# Patient Record
Sex: MALE | Race: BLACK OR AFRICAN AMERICAN | Employment: OTHER | URBAN - METROPOLITAN AREA
[De-identification: names, ages, dates, MRNs, and addresses within clinical notes are randomized per-mention and may not be internally consistent; named-entity substitution may affect disease eponyms.]

---

## 2017-04-03 ENCOUNTER — ANESTHESIA (OUTPATIENT)
Dept: ENDOSCOPY | Age: 72
End: 2017-04-03
Payer: MEDICARE

## 2017-04-03 ENCOUNTER — ANESTHESIA EVENT (OUTPATIENT)
Dept: ENDOSCOPY | Age: 72
End: 2017-04-03
Payer: MEDICARE

## 2017-04-03 ENCOUNTER — HOSPITAL ENCOUNTER (OUTPATIENT)
Age: 72
Setting detail: OUTPATIENT SURGERY
Discharge: HOME | End: 2017-04-03
Attending: INTERNAL MEDICINE | Admitting: INTERNAL MEDICINE
Payer: MEDICARE

## 2017-04-03 VITALS
HEIGHT: 68 IN | DIASTOLIC BLOOD PRESSURE: 78 MMHG | HEART RATE: 97 BPM | TEMPERATURE: 98.5 F | SYSTOLIC BLOOD PRESSURE: 120 MMHG | OXYGEN SATURATION: 100 % | RESPIRATION RATE: 16 BRPM

## 2017-04-03 VITALS
WEIGHT: 228 LBS | DIASTOLIC BLOOD PRESSURE: 73 MMHG | BODY MASS INDEX: 33.77 KG/M2 | RESPIRATION RATE: 18 BRPM | OXYGEN SATURATION: 100 % | HEART RATE: 72 BPM | HEIGHT: 69 IN | TEMPERATURE: 97.5 F | SYSTOLIC BLOOD PRESSURE: 123 MMHG

## 2017-04-03 LAB
ATRIAL RATE: 72 BPM
CALCULATED R AXIS, ECG10: -94 DEGREES
CALCULATED T AXIS, ECG11: 76 DEGREES
DIAGNOSIS, 93000: NORMAL
GLUCOSE BLD STRIP.AUTO-MCNC: 89 MG/DL (ref 70–110)
GLUCOSE BLD STRIP.AUTO-MCNC: 96 MG/DL (ref 70–110)
Q-T INTERVAL, ECG07: 412 MS
QRS DURATION, ECG06: 162 MS
QTC CALCULATION (BEZET), ECG08: 453 MS
VENTRICULAR RATE, ECG03: 73 BPM

## 2017-04-03 PROCEDURE — 74011250636 HC RX REV CODE- 250/636: Performed by: ANESTHESIOLOGY

## 2017-04-03 PROCEDURE — 74011250636 HC RX REV CODE- 250/636

## 2017-04-03 PROCEDURE — 76060000031 HC ANESTHESIA FIRST 0.5 HR: Performed by: INTERNAL MEDICINE

## 2017-04-03 PROCEDURE — 76040000019: Performed by: INTERNAL MEDICINE

## 2017-04-03 PROCEDURE — 77030008565 HC TBNG SUC IRR ERBE -B: Performed by: INTERNAL MEDICINE

## 2017-04-03 PROCEDURE — 77030018846 HC SOL IRR STRL H20 ICUM -A: Performed by: INTERNAL MEDICINE

## 2017-04-03 PROCEDURE — 93005 ELECTROCARDIOGRAM TRACING: CPT

## 2017-04-03 RX ORDER — ONDANSETRON 2 MG/ML
4 INJECTION INTRAMUSCULAR; INTRAVENOUS ONCE
Status: DISCONTINUED | OUTPATIENT
Start: 2017-04-03 | End: 2017-04-03 | Stop reason: HOSPADM

## 2017-04-03 RX ORDER — INSULIN LISPRO 100 [IU]/ML
INJECTION, SOLUTION INTRAVENOUS; SUBCUTANEOUS ONCE
Status: DISCONTINUED | OUTPATIENT
Start: 2017-04-03 | End: 2017-04-03 | Stop reason: HOSPADM

## 2017-04-03 RX ORDER — FAMOTIDINE 10 MG/ML
20 INJECTION INTRAVENOUS ONCE
Status: DISCONTINUED | OUTPATIENT
Start: 2017-04-03 | End: 2017-04-03 | Stop reason: HOSPADM

## 2017-04-03 RX ORDER — SODIUM CHLORIDE, SODIUM LACTATE, POTASSIUM CHLORIDE, CALCIUM CHLORIDE 600; 310; 30; 20 MG/100ML; MG/100ML; MG/100ML; MG/100ML
75 INJECTION, SOLUTION INTRAVENOUS CONTINUOUS
Status: DISCONTINUED | OUTPATIENT
Start: 2017-04-03 | End: 2017-04-03 | Stop reason: HOSPADM

## 2017-04-03 RX ORDER — SODIUM CHLORIDE 0.9 % (FLUSH) 0.9 %
5-10 SYRINGE (ML) INJECTION AS NEEDED
Status: DISCONTINUED | OUTPATIENT
Start: 2017-04-03 | End: 2017-04-03 | Stop reason: HOSPADM

## 2017-04-03 RX ORDER — MAGNESIUM SULFATE 100 %
4 CRYSTALS MISCELLANEOUS AS NEEDED
Status: DISCONTINUED | OUTPATIENT
Start: 2017-04-03 | End: 2017-04-03 | Stop reason: HOSPADM

## 2017-04-03 RX ORDER — PROPOFOL 10 MG/ML
INJECTION, EMULSION INTRAVENOUS AS NEEDED
Status: DISCONTINUED | OUTPATIENT
Start: 2017-04-03 | End: 2017-04-03 | Stop reason: HOSPADM

## 2017-04-03 RX ORDER — VITAMIN E 1000 UNIT
1000 CAPSULE ORAL
COMMUNITY

## 2017-04-03 RX ORDER — SODIUM CHLORIDE, SODIUM LACTATE, POTASSIUM CHLORIDE, CALCIUM CHLORIDE 600; 310; 30; 20 MG/100ML; MG/100ML; MG/100ML; MG/100ML
25 INJECTION, SOLUTION INTRAVENOUS CONTINUOUS
Status: DISCONTINUED | OUTPATIENT
Start: 2017-04-03 | End: 2017-04-03 | Stop reason: HOSPADM

## 2017-04-03 RX ORDER — MELATONIN
1000 DAILY
COMMUNITY

## 2017-04-03 RX ORDER — DEXTROSE 50 % IN WATER (D50W) INTRAVENOUS SYRINGE
25-50 AS NEEDED
Status: DISCONTINUED | OUTPATIENT
Start: 2017-04-03 | End: 2017-04-03 | Stop reason: HOSPADM

## 2017-04-03 RX ORDER — SODIUM CHLORIDE 0.9 % (FLUSH) 0.9 %
5-10 SYRINGE (ML) INJECTION EVERY 8 HOURS
Status: DISCONTINUED | OUTPATIENT
Start: 2017-04-03 | End: 2017-04-03 | Stop reason: HOSPADM

## 2017-04-03 RX ADMIN — PROPOFOL 50 MG: 10 INJECTION, EMULSION INTRAVENOUS at 10:24

## 2017-04-03 RX ADMIN — SODIUM CHLORIDE, SODIUM LACTATE, POTASSIUM CHLORIDE, AND CALCIUM CHLORIDE 25 ML/HR: 600; 310; 30; 20 INJECTION, SOLUTION INTRAVENOUS at 08:57

## 2017-04-03 RX ADMIN — PROPOFOL 50 MG: 10 INJECTION, EMULSION INTRAVENOUS at 10:29

## 2017-04-03 RX ADMIN — PROPOFOL 30 MG: 10 INJECTION, EMULSION INTRAVENOUS at 10:27

## 2017-04-03 RX ADMIN — PROPOFOL 20 MG: 10 INJECTION, EMULSION INTRAVENOUS at 10:32

## 2017-04-03 NOTE — IP AVS SNAPSHOT
Jonas Spannyanet 
 
 
 920 [facility] 61 [facility] Patient: Del Sibley MRN: [id number] BVE:[birth year] You are allergic to the following Allergen Reactions Tradjenta (Linagliptin) Anaphylaxis Ace Inhibitors Angioedema Recent Documentation Height Weight BMI Smoking Status 1.74 m 103.4 kg 34.16 kg/m2 Former Smoker Emergency Contacts Name Discharge Info Relation Home Work Mobile Shantal Rios NO [2] Other Relative [6] [phone] About your hospitalization You were admitted on:  April 3, 2017 You last received care in the:  [facility] PHASE 2 RECOVERY You were discharged on:  April 3, 2017 Unit phone number:  [phone] Why you were hospitalized Your primary diagnosis was:  Not on File Providers Seen During Your Hospitalizations Provider Role Specialty Primary office phone Jony Odonnell MD Attending Provider Gastroenterology [phone] Your Primary Care Physician (PCP) Primary Care Physician Office Phone Office Fax [facility] [phone] Follow-up Information Follow up With Details Comments Contact Info Zaid Rebollar MD   [address]  [address] [facility] [address] 
[phone] Jony Odonnell MD  Colonoscopy in 5 years. [address] [facility] [address] [facility] [address] 
[phone] Your Appointments Monday April 03, 2017 COLONOSCOPY with Jony Odonnell MD  
[facility] ENDOSCOPY [address]) [address] [facility] [address] Tuesday April 11, 2017 10:15 AM EDT Nurse Visit with [email or web address]_UVA [facility] [address] ([facility]) 127 [facility] 200 [facility] [address]  
[phone]  Tuesday April 25, 2017  9:45 AM EDT  
ESTABLISHED PATIENT with Jordy Gibson MD  
 [facility] [address] ([address]) [address]  
[phone] Current Discharge Medication List  
  
CONTINUE these medications which have NOT CHANGED Dose & Instructions Dispensing Information Comments Morning Noon Evening Bedtime  
 allopurinol 300 mg tablet Commonly known as:  Ritta Popper Your last dose was: Your next dose is: Take  by mouth daily. Refills:  0 AMARYL 4 mg tablet Generic drug:  glimepiride Your last dose was: Your next dose is: Take  by mouth every morning. Refills:  0  
     
   
   
   
  
 aspirin 81 mg chewable tablet Your last dose was: Your next dose is:    
   
   
 Dose:  81 mg Take 81 mg by mouth daily. Refills:  0  
     
   
   
   
  
 carvedilol 25 mg tablet Commonly known as:  Maria M Gay Your last dose was: Your next dose is:    
   
   
 Dose:  25 mg Take 25 mg by mouth two (2) times daily (with meals). Refills:  0  
     
   
   
   
  
 COUMADIN 2.5 mg tablet Generic drug:  warfarin Your last dose was: Your next dose is:    
   
   
 Dose:  2.5 mg Take 2.5 mg by mouth daily. Refills:  0  
     
   
   
   
  
 finasteride 5 mg tablet Commonly known as:  PROSCAR Your last dose was: Your next dose is:    
   
   
 Dose:  5 mg Take 5 mg by mouth daily. Refills:  0  
     
   
   
   
  
 furosemide 40 mg tablet Commonly known as:  LASIX Your last dose was: Your next dose is: Take  by mouth daily. Refills:  0  
     
   
   
   
  
 LANOXIN 0.25 mg tablet Generic drug:  digoxin Your last dose was: Your next dose is: Take  by mouth daily. Refills:  0  
     
   
   
   
  
 losartan 100 mg tablet Commonly known as:  COZAAR Your last dose was: Your next dose is: Dose:  100 mg Take 100 mg by mouth daily. Refills:  0  
     
   
   
   
  
 metFORMIN 1,000 mg tablet Commonly known as:  GLUCOPHAGE Your last dose was: Your next dose is:    
   
   
 Dose:  1000 mg Take 1,000 mg by mouth two (2) times daily (with meals). Refills:  0  
     
   
   
   
  
 metoprolol tartrate 100 mg IR tablet Commonly known as:  LOPRESSOR Your last dose was: Your next dose is: Take  by mouth two (2) times a day. Refills:  0  
     
   
   
   
  
 multivitamin tablet Commonly known as:  ONE A DAY Your last dose was: Your next dose is:    
   
   
 Dose:  1 Tab Take 1 tablet by mouth daily. Refills:  0  
     
   
   
   
  
 spironolactone 25 mg tablet Commonly known as:  ALDACTONE Your last dose was: Your next dose is: Take  by mouth daily. Refills:  0  
     
   
   
   
  
 VITAMIN B-12 1,000 mcg tablet Generic drug:  cyanocobalamin Your last dose was: Your next dose is:    
   
   
 Dose:  1000 mcg Take 1,000 mcg by mouth daily. Refills:  0  
     
   
   
   
  
 VITAMIN C 1,000 mg tablet Generic drug:  ascorbic acid (vitamin C) Your last dose was: Your next dose is:    
   
   
 Dose:  1000 mg Take 1,000 mg by mouth. Refills:  0  
     
   
   
   
  
 VITAMIN D3 1,000 unit tablet Generic drug:  cholecalciferol Your last dose was: Your next dose is:    
   
   
 Dose:  1000 Units Take 1,000 Units by mouth daily. Refills:  0 ZETIA 10 mg tablet Generic drug:  ezetimibe Your last dose was: Your next dose is: Take  by mouth. Refills:  0 ZOCOR 40 mg tablet Generic drug:  simvastatin Your last dose was: Your next dose is: Take  by mouth nightly. Refills:  0 Discharge Instructions Colonoscopy: What to Expect at [facility] Your Recovery After you have a colonoscopy, you will stay at the clinic for 1 to 2 hours until the medicines wear off. Then you can go home. But you will need to arrange for a ride. Your doctor will tell you when you can eat and do your other usual activities. Your doctor will talk to you about when you will need your next colonoscopy. Your doctor can help you decide how often you need to be checked. This will depend on the results of your test and your risk for colorectal cancer. After the test, you may be bloated or have gas pains. You may need to pass gas. If a biopsy was done or a polyp was removed, you may have streaks of blood in your stool (feces) for a few days. This care sheet gives you a general idea about how long it will take for you to recover. But each person recovers at a different pace. Follow the steps below to get better as quickly as possible. How can you care for yourself at home? Activity · Rest when you feel tired. · You can do your normal activities when it feels okay to do so. Diet · Follow your doctor's directions for eating. · Unless your doctor has told you not to, drink plenty of fluids. This helps to replace the fluids that were lost during the colon prep. · Do not drink alcohol. Medicines · Your doctor will tell you if and when you can restart your medicines. He or she will also give you instructions about taking any new medicines. · If you take blood thinners, such as warfarin (Coumadin), clopidogrel (Plavix), or aspirin, be sure to talk to your doctor. He or she will tell you if and when to start taking those medicines again. Make sure that you understand exactly what your doctor wants you to do.  
· If polyps were removed or a biopsy was done during the test, your doctor may tell you not to take aspirin or other anti-inflammatory medicines for a few days. These include ibuprofen (Advil, Motrin) and naproxen (Aleve). Other instructions · For your safety, do not drive or operate machinery until the medicine wears off and you can think clearly. Your doctor may tell you not to drive or operate machinery until the day after your test. 
· Do not sign legal documents or make major decisions until the medicine wears off and you can think clearly. The anesthesia can make it hard for you to fully understand what you are agreeing to. Follow-up care is a key part of your treatment and safety. Be sure to make and go to all appointments, and call your doctor if you are having problems. It's also a good idea to know your test results and keep a list of the medicines you take. When should you call for help? Call 911 anytime you think you may need emergency care. For example, call if: 
· You passed out (lost consciousness). · You pass maroon or bloody stools. · You have severe belly pain. Call your doctor now or seek immediate medical care if: 
· Your stools are black and tarlike. · Your stools have streaks of blood, but you did not have a biopsy or any polyps removed. · You have belly pain, or your belly is swollen and firm. · You vomit. · You have a fever. · You are very dizzy. Watch closely for changes in your health, and be sure to contact your doctor if you have any problems. Where can you learn more? Go to http://yaw-junie.info/. Enter E264 in the search box to learn more about \"Colonoscopy: What to Expect at Home. \" Current as of: August 9, 2016 Content Version: 11.2 © 2458-7113 [employer]. Care instructions adapted under license by [employer] (which disclaims liability or warranty for this information).  If you have questions about a medical condition or this instruction, always ask your healthcare professional. [address] any warranty or liability for your use of this information. Diverticulosis: Care Instructions Your Care Instructions In diverticulosis, pouches called diverticula form in the wall of the large intestine (colon). The pouches do not cause any pain or other symptoms. Most people who have diverticulosis do not know they have it. But the pouches sometimes bleed, and if they become infected, they can cause pain and other symptoms. When this happens, it is called diverticulitis. Diverticula form when pressure pushes the wall of the colon outward at certain weak points. A diet that is too low in fiber can cause diverticula. Follow-up care is a key part of your treatment and safety. Be sure to make and go to all appointments, and call your doctor if you are having problems. It's also a good idea to know your test results and keep a list of the medicines you take. How can you care for yourself at home? · Include fruits, leafy green vegetables, beans, and whole grains in your diet each day. These foods are high in fiber. · Take a fiber supplement, such as Citrucel or Metamucil, every day if needed. Read and follow all instructions on the label. · Drink plenty of fluids, enough so that your urine is light yellow or clear like water. If you have kidney, heart, or liver disease and have to limit fluids, talk with your doctor before you increase the amount of fluids you drink. · Get at least 30 minutes of exercise on most days of the week. Walking is a good choice. You also may want to do other activities, such as running, swimming, cycling, or playing tennis or team sports. · Cut out foods that cause gas, pain, or other symptoms. When should you call for help? Call your doctor now or seek immediate medical care if: 
· You have belly pain. · You pass maroon or very bloody stools. · You have a fever. · You have nausea and vomiting. · You have unusual changes in your bowel movements or abdominal swelling. · You have burning pain when you urinate. · You have abnormal vaginal discharge. · You have shoulder pain. · You have cramping pain that does not get better when you have a bowel movement or pass gas. · You pass gas or stool from your urethra while urinating. Watch closely for changes in your health, and be sure to contact your doctor if you have any problems. Where can you learn more? Go to http://yaw-junie.info/. Enter G384 in the search box to learn more about \"Diverticulosis: Care Instructions. \" Current as of: August 9, 2016 Content Version: 11.2 © 4005-6101 [employer]. Care instructions adapted under license by [employer] (which disclaims liability or warranty for this information). If you have questions about a medical condition or this instruction, always ask your healthcare professional. [place] [address] any warranty or liability for your use of this information. DISCHARGE SUMMARY from Nurse The following personal items are in your possession at time of discharge: 
 
Dental Appliances: None Visual Aid: Glasses PATIENT INSTRUCTIONS: 
 
 
F-face looks uneven A-arms unable to move or move unevenly S-speech slurred or non-existent T-time-call 911 as soon as signs and symptoms begin-DO NOT go Back to bed or wait to see if you get better-TIME IS BRAIN. Warning Signs of HEART ATTACK Call 911 if you have these symptoms: 
? Chest discomfort. Most heart attacks involve discomfort in the center of the chest that lasts more than a few minutes, or that goes away and comes back. It can feel like uncomfortable pressure, squeezing, fullness, or pain. ? Discomfort in other areas of the upper body. Symptoms can include pain or discomfort in one or both arms, the back, neck, jaw, or stomach. ? Shortness of breath with or without chest discomfort. ? Other signs may include breaking out in a cold sweat, nausea, or lightheadedness. Don't wait more than five minutes to call [address]! Fast action can save your life. Calling 911 is almost always the fastest way to get lifesaving treatment. Emergency Medical Services staff can begin treatment when they arrive  up to an hour sooner than if someone gets to the hospital by car. The discharge information has been reviewed with the patient. The patient verbalized understanding. Discharge medications reviewed with the patient and appropriate educational materials and side effects teaching were provided. Discharge Orders None Introducing \[facility]\"" & HEALTH SERVICES! [facility] introduces e|tab patient portal. Now you can access parts of your medical record, email your doctor's office, and request medication refills online. 1. In your internet browser, go to [email or web address] 2. Click on the First Time User? Click Here link in the Sign In box. You will see the New Member Sign Up page. 3. Enter your e|tab Access Code exactly as it appears below. You will not need to use this code after youve completed the sign-up process. If you do not sign up before the expiration date, you must request a new code. · e|tab Access Code: [email or web address] Expires: 6/27/2017  4:56 PM 
 
4. Enter the last four digits of your Social Security Number (xxxx) and Date of Birth (mm/dd/yyyy) as indicated and click Submit. You will be taken to the next sign-up page. 5. Create a e|tab ID. This will be your e|tab login ID and cannot be changed, so think of one that is secure and easy to remember. 6. Create a e|tab password. You can change your password at any time. 7. Enter your Password Reset Question and Answer. This can be used at a later time if you forget your password. 8. Enter your e-mail address. You will receive e-mail notification when new information is available in [address] 9. Click Sign Up. You can now view and download portions of your medical record. 10. Click the Download Summary menu link to download a portable copy of your medical information. If you have questions, please visit the Frequently Asked Questions section of the [employer] website. Remember, [employer] is NOT to be used for urgent needs. For medical emergencies, dial 911. Now available from your iPhone and Android! General Information Please provide this summary of care documentation to your next provider. Patient Signature:  ____________________________________________________________ Date:  ____________________________________________________________  
  
Jamison Ana Provider Signature:  ____________________________________________________________ Date:  ____________________________________________________________

## 2017-04-03 NOTE — DISCHARGE INSTRUCTIONS
Colonoscopy: What to Expect at [address] [place] [address]  After you have a colonoscopy, you will stay at the clinic for 1 to 2 hours until the medicines wear off. Then you can go home. But you will need to arrange for a ride. Your doctor will tell you when you can eat and do your other usual activities. Your doctor will talk to you about when you will need your next colonoscopy. Your doctor can help you decide how often you need to be checked. This will depend on the results of your test and your risk for colorectal cancer. After the test, you may be bloated or have gas pains. You may need to pass gas. If a biopsy was done or a polyp was removed, you may have streaks of blood in your stool (feces) for a few days. This care sheet gives you a general idea about how long it will take for you to recover. But each person recovers at a different pace. Follow the steps below to get better as quickly as possible. How can you care for yourself at home? Activity  · Rest when you feel tired. · You can do your normal activities when it feels okay to do so. Diet  · Follow your doctor's directions for eating. · Unless your doctor has told you not to, drink plenty of fluids. This helps to replace the fluids that were lost during the colon prep. · Do not drink alcohol. Medicines  · Your doctor will tell you if and when you can restart your medicines. He or she will also give you instructions about taking any new medicines. · If you take blood thinners, such as warfarin (Coumadin), clopidogrel (Plavix), or aspirin, be sure to talk to your doctor. He or she will tell you if and when to start taking those medicines again. Make sure that you understand exactly what your doctor wants you to do. · If polyps were removed or a biopsy was done during the test, your doctor may tell you not to take aspirin or other anti-inflammatory medicines for a few days. These include ibuprofen (Advil, Motrin) and naproxen (Aleve).   Other instructions  · For your safety, do not drive or operate machinery until the medicine wears off and you can think clearly. Your doctor may tell you not to drive or operate machinery until the day after your test.  · Do not sign legal documents or make major decisions until the medicine wears off and you can think clearly. The anesthesia can make it hard for you to fully understand what you are agreeing to. Follow-up care is a key part of your treatment and safety. Be sure to make and go to all appointments, and call your doctor if you are having problems. It's also a good idea to know your test results and keep a list of the medicines you take. When should you call for help? Call 911 anytime you think you may need emergency care. For example, call if:  · You passed out (lost consciousness). · You pass maroon or bloody stools. · You have severe belly pain. Call your doctor now or seek immediate medical care if:  · Your stools are black and tarlike. · Your stools have streaks of blood, but you did not have a biopsy or any polyps removed. · You have belly pain, or your belly is swollen and firm. · You vomit. · You have a fever. · You are very dizzy. Watch closely for changes in your health, and be sure to contact your doctor if you have any problems. Where can you learn more? Go to http://yaw-junie.info/. Enter E264 in the search box to learn more about \"Colonoscopy: What to Expect at Home. \"  Current as of: August 9, 2016  Content Version: 11.2  © 2229-7347 [employer]. Care instructions adapted under license by [employer] (which disclaims liability or warranty for this information). If you have questions about a medical condition or this instruction, always ask your healthcare professional. [address] any warranty or liability for your use of this information.      Diverticulosis: Care Instructions  Your Care Instructions  In diverticulosis, pouches called diverticula form in the wall of the large intestine (colon). The pouches do not cause any pain or other symptoms. Most people who have diverticulosis do not know they have it. But the pouches sometimes bleed, and if they become infected, they can cause pain and other symptoms. When this happens, it is called diverticulitis. Diverticula form when pressure pushes the wall of the colon outward at certain weak points. A diet that is too low in fiber can cause diverticula. Follow-up care is a key part of your treatment and safety. Be sure to make and go to all appointments, and call your doctor if you are having problems. It's also a good idea to know your test results and keep a list of the medicines you take. How can you care for yourself at home? · Include fruits, leafy green vegetables, beans, and whole grains in your diet each day. These foods are high in fiber. · Take a fiber supplement, such as Citrucel or Metamucil, every day if needed. Read and follow all instructions on the label. · Drink plenty of fluids, enough so that your urine is light yellow or clear like water. If you have kidney, heart, or liver disease and have to limit fluids, talk with your doctor before you increase the amount of fluids you drink. · Get at least 30 minutes of exercise on most days of the week. Walking is a good choice. You also may want to do other activities, such as running, swimming, cycling, or playing tennis or team sports. · Cut out foods that cause gas, pain, or other symptoms. When should you call for help? Call your doctor now or seek immediate medical care if:  · You have belly pain. · You pass maroon or very bloody stools. · You have a fever. · You have nausea and vomiting. · You have unusual changes in your bowel movements or abdominal swelling. · You have burning pain when you urinate. · You have abnormal vaginal discharge. · You have shoulder pain.   · You have cramping pain that does not get better when you have a bowel movement or pass gas. · You pass gas or stool from your urethra while urinating. Watch closely for changes in your health, and be sure to contact your doctor if you have any problems. Where can you learn more? Go to http://yaw-junie.info/. Enter C359 in the search box to learn more about \"Diverticulosis: Care Instructions. \"  Current as of: August 9, 2016  Content Version: 11.2  © 7110-8496 [employer]. Care instructions adapted under license by [employer] (which disclaims liability or warranty for this information). If you have questions about a medical condition or this instruction, always ask your healthcare professional. [place] [address] any warranty or liability for your use of this information. DISCHARGE SUMMARY from Nurse    The following personal items are in your possession at time of discharge:    Dental Appliances: None  Visual Aid: Glasses                  PATIENT INSTRUCTIONS:    After general anesthesia or intravenous sedation, for 24 hours or while taking prescription Narcotics:  · Limit your activities  · Do not drive and operate hazardous machinery  · Do not make important personal or business decisions  · Do  not drink alcoholic beverages  · If you have not urinated within 8 hours after discharge, please contact your surgeon on call. Report the following to your surgeon:  · Excessive pain, swelling, redness or odor of or around the surgical area  · Temperature over 100.5  · Nausea and vomiting lasting longer than 4 hours or if unable to take medications  · Any signs of decreased circulation or nerve impairment to extremity: change in color, persistent  numbness, tingling, coldness or increase pain  · Any questions    *  Please give a list of your current medications to your Primary Care Provider.     *  Please update this list whenever your medications are discontinued, doses are      changed, or new medications (including over-the-counter products) are added. *  Please carry medication information at all times in case of emergency situations. These are general instructions for a healthy lifestyle:    No smoking/ No tobacco products/ Avoid exposure to second hand smoke    Surgeon General's Warning:  Quitting smoking now greatly reduces serious risk to your health. Obesity, smoking, and sedentary lifestyle greatly increases your risk for illness    A healthy diet, regular physical exercise & weight monitoring are important for maintaining a healthy lifestyle    You may be retaining fluid if you have a history of heart failure or if you experience any of the following symptoms:  Weight gain of 3 pounds or more overnight or 5 pounds in a week, increased swelling in our hands or feet or shortness of breath while lying flat in bed. Please call your doctor as soon as you notice any of these symptoms; do not wait until your next office visit. Recognize signs and symptoms of STROKE:    F-face looks uneven    A-arms unable to move or move unevenly    S-speech slurred or non-existent    T-time-call 911 as soon as signs and symptoms begin-DO NOT go       Back to bed or wait to see if you get better-TIME IS BRAIN. Warning Signs of HEART ATTACK     Call 911 if you have these symptoms:   Chest discomfort. Most heart attacks involve discomfort in the center of the chest that lasts more than a few minutes, or that goes away and comes back. It can feel like uncomfortable pressure, squeezing, fullness, or pain.  Discomfort in other areas of the upper body. Symptoms can include pain or discomfort in one or both arms, the back, neck, jaw, or stomach.  Shortness of breath with or without chest discomfort.  Other signs may include breaking out in a cold sweat, nausea, or lightheadedness. Don't wait more than five minutes to call 911 - MINUTES MATTER!  Fast action can save your life. Calling 911 is almost always the fastest way to get lifesaving treatment. Emergency Medical Services staff can begin treatment when they arrive -- up to an hour sooner than if someone gets to the hospital by car. The discharge information has been reviewed with the patient. The patient verbalized understanding. Discharge medications reviewed with the patient and appropriate educational materials and side effects teaching were provided.

## 2017-04-03 NOTE — PROCEDURES
[facility]  [address] [place], [address]      Brief Procedure Note    Del Sibley  1945  [id number]    Date of Procedure: 4/3/2017    Preoperative diagnosis: dx Hx of polyps    Postoperative diagnosis:  Diverticulosis    Type of Anesthesia: MAC (monitered anesthesia care)    Description of Findings: same as post op dx    Procedure: Procedure(s):  COLONOSCOPY    :  Dr. Jony Odonnell MD    Assistant(s): [unfilled]    Type of Anesthesia:MAC     EBL:None    Specimens: * No specimens in log *    Findings: See printed and scanned procedure note    Complications: None    Dr. Jony Odonnell MD  4/3/2017  10:37 AM

## 2017-04-03 NOTE — ANESTHESIA PREPROCEDURE EVALUATION
Anesthetic History   No history of anesthetic complications            Review of Systems / Medical History  Patient summary reviewed and pertinent labs reviewed    Pulmonary        Sleep apnea: No treatment  Undiagnosed apnea         Neuro/Psych   Within defined limits           Cardiovascular  Within defined limits  Hypertension        Dysrhythmias   CAD    Exercise tolerance: >4 METS     GI/Hepatic/Renal  Within defined limits              Endo/Other    Diabetes: well controlled, type 2         Other Findings   Comments:   Risk Factors for Postoperative nausea/vomiting:       History of postoperative nausea/vomiting? NO       Female? NO       Motion sickness? NO       Intended opioid administration for postoperative analgesia?   NO           Physical Exam    Airway  Mallampati: II  TM Distance: 4 - 6 cm  Neck ROM: normal range of motion   Mouth opening: Normal     Cardiovascular  Regular rate and rhythm,  S1 and S2 normal,  no murmur, click, rub, or gallop  Rhythm: regular  Rate: normal         Dental    Dentition: Lower dentition intact and Upper dentition intact     Pulmonary  Breath sounds clear to auscultation               Abdominal  GI exam deferred       Other Findings            Anesthetic Plan    ASA: 3  Anesthesia type: MAC          Induction: Intravenous  Anesthetic plan and risks discussed with: Patient

## 2017-04-03 NOTE — ANESTHESIA POSTPROCEDURE EVALUATION
Post-Anesthesia Evaluation and Assessment    Patient: Paul Smith MRN: [id number]  SSN: [id number]    YOB: 1945  Age: 66 y.o. Sex: male       Cardiovascular Function/Vital Signs  Visit Vitals    BP 98/54    Pulse 87    Temp 36.7 °C (98 °F)    Resp 12    Ht 5' 8.5\" (1.74 m)    Wt 103.4 kg (228 lb)    SpO2 97%    BMI 34.16 kg/m2       Patient is status post MAC anesthesia for Procedure(s):  COLONOSCOPY. Nausea/Vomiting: None    Postoperative hydration reviewed and adequate. Pain:  Pain Scale 1: Numeric (0 - 10) (04/03/17 0859)  Pain Intensity 1: 0 (04/03/17 0859)   Managed    Neurological Status: At baseline    Mental Status and Level of Consciousness: Arousable    Pulmonary Status:   O2 Device: Nasal cannula (04/03/17 1037)   Adequate oxygenation and airway patent    Complications related to anesthesia: None    Post-anesthesia assessment completed.  No concerns    Signed By: Aline Méndez CRNA     April 3, 2017

## 2017-04-03 NOTE — H&P
Chief Complaint: History of colon polyps. History of present illness: No complaints. PMH:   Past Medical History:   Diagnosis Date    Anemia     Atrial fibrillation (HCC)     BPH (benign prostatic hypertrophy)     CAD (coronary artery disease)     CHF (congestive heart failure) (HCC)     Diabetes (HCC)     Gout     Hyperlipidemia     Hypertension     Pacemaker     Vitamin D deficiency      Allergies: Allergies   Allergen Reactions    Tradjenta [Linagliptin] Anaphylaxis    Ace Inhibitors Angioedema     Medications:   Current Facility-Administered Medications:     lactated ringers infusion, 25 mL/hr, IntraVENous, CONTINUOUS, Aurea Alicea MD, Last Rate: 25 mL/hr at 04/03/17 0857, 25 mL/hr at 04/03/17 0857    sodium chloride (NS) flush 5-10 mL, 5-10 mL, IntraVENous, Q8H, Aurea Alicea MD    sodium chloride (NS) flush 5-10 mL, 5-10 mL, IntraVENous, PRN, Aurea Alicea MD    famotidine (PF) (PEPCID) injection 20 mg, 20 mg, IntraVENous, ONCE, Aurea Alicea MD, Stopped at 04/03/17 6750    insulin lispro (HUMALOG) injection, , SubCUTAneous, ONCE, Aurea Alicea MD  FH: History reviewed. No pertinent family history.   Social:   Social History     Social History    Marital status:      Spouse name: N/A    Number of children: N/A    Years of education: N/A     Social History Main Topics    Smoking status: Former Smoker     Quit date: 7/29/1981    Smokeless tobacco: Never Used    Alcohol use No    Drug use: No    Sexual activity: Not Asked     Other Topics Concern    None     Social History Narrative     Surgical H:   Past Surgical History:   Procedure Laterality Date    HX OTHER SURGICAL      colonoscopy    HX PACEMAKER      defibrillator    HX PACEMAKER PLACEMENT      HX TONSILLECTOMY         ROS: negative    Physical Exam:   Visit Vitals    /86    Pulse 84    Temp 98 °F (36.7 °C)    Resp 17    Ht 5' 8.5\" (1.74 m)    Wt 103.4 kg (228 lb)    SpO2 96%    BMI 34.16 kg/m2 General appearance: alert, no distress  Eyes: pupils equal and reactive, extraocular eye movements intact  Nodes: No gross adenopathy in neck. Skin: no spider angiomata, jaundice, palmar erythema   Respiratory: clear to auscultation bilaterally  Cardiovascular: regular heart rate, no murmurs, no JVD, normal rate and regular rhythm  Abdomen: soft, non-tender, liver not enlarged, spleen not palpable, no obvious ascites  Extremities: no muscle wasting, no gross arthritic changes  Neurologic: alert and oriented, cranial nerves grossly intact, no asterixis    Labs:   Recent Results (from the past 24 hour(s))   GLUCOSE, POC    Collection Time: 04/03/17  7:16 AM   Result Value Ref Range    Glucose (POC) 96 70 - 110 mg/dL   EKG, 12 LEAD, INITIAL    Collection Time: 04/03/17  9:07 AM   Result Value Ref Range    Ventricular Rate 73 BPM    Atrial Rate 72 BPM    QRS Duration 162 ms    Q-T Interval 412 ms    QTC Calculation (Bezet) 453 ms    Calculated R Axis -94 degrees    Calculated T Axis 76 degrees    Diagnosis       Electronic ventricular pacemaker  No previous ECGs available           Imp/ Plan: Will proceed with colonoscopy as planned. Risk benefits alternative including but not limited to infection, bleeding, perforation of viscous, allergic reaction and resultant morbidity and mortality was discussed. Chance of missing a significant lesion due to various reasons were discussed.       Shelly Aldana MD  [facility], [address]

## 2017-10-04 ENCOUNTER — HOSPITAL ENCOUNTER (INPATIENT)
Age: 72
LOS: 6 days | Discharge: HOME | DRG: 377 | End: 2017-10-10
Attending: EMERGENCY MEDICINE | Admitting: HOSPITALIST
Payer: MEDICARE

## 2017-10-04 DIAGNOSIS — D64.9 SYMPTOMATIC ANEMIA: ICD-10-CM

## 2017-10-04 DIAGNOSIS — K92.2 GASTROINTESTINAL HEMORRHAGE, UNSPECIFIED GASTROINTESTINAL HEMORRHAGE TYPE: Primary | ICD-10-CM

## 2017-10-04 PROBLEM — K62.5 RECTAL BLEED: Status: ACTIVE | Noted: 2017-10-04

## 2017-10-04 LAB
ALBUMIN SERPL-MCNC: 2.7 G/DL (ref 3.4–5)
ALBUMIN/GLOB SERPL: 1 {RATIO} (ref 0.8–1.7)
ALP SERPL-CCNC: 40 U/L (ref 45–117)
ALT SERPL-CCNC: 18 U/L (ref 16–61)
ANION GAP SERPL CALC-SCNC: 8 MMOL/L (ref 3–18)
AST SERPL-CCNC: 13 U/L (ref 15–37)
BASOPHILS # BLD: 0 K/UL (ref 0–0.1)
BASOPHILS NFR BLD: 0 % (ref 0–2)
BILIRUB SERPL-MCNC: 0.6 MG/DL (ref 0.2–1)
BUN SERPL-MCNC: 86 MG/DL (ref 7–18)
BUN/CREAT SERPL: 55 (ref 12–20)
CALCIUM SERPL-MCNC: 8.5 MG/DL (ref 8.5–10.1)
CHLORIDE SERPL-SCNC: 110 MMOL/L (ref 100–108)
CO2 SERPL-SCNC: 24 MMOL/L (ref 21–32)
CREAT SERPL-MCNC: 1.56 MG/DL (ref 0.6–1.3)
DIFFERENTIAL METHOD BLD: ABNORMAL
EOSINOPHIL # BLD: 0 K/UL (ref 0–0.4)
EOSINOPHIL NFR BLD: 0 % (ref 0–5)
ERYTHROCYTE [DISTWIDTH] IN BLOOD BY AUTOMATED COUNT: 16.3 % (ref 11.6–14.5)
EST. AVERAGE GLUCOSE BLD GHB EST-MCNC: ABNORMAL MG/DL
GLOBULIN SER CALC-MCNC: 2.8 G/DL (ref 2–4)
GLUCOSE BLD STRIP.AUTO-MCNC: 203 MG/DL (ref 70–110)
GLUCOSE SERPL-MCNC: 212 MG/DL (ref 74–99)
HBA1C MFR BLD: <3.5 % (ref 4.2–5.6)
HCT VFR BLD AUTO: 16.8 % (ref 36–48)
HGB BLD-MCNC: 5.7 G/DL (ref 13–16)
INR PPP: 1.5 (ref 0.8–1.2)
LYMPHOCYTES # BLD: 0.9 K/UL (ref 0.9–3.6)
LYMPHOCYTES NFR BLD: 10 % (ref 21–52)
MCH RBC QN AUTO: 25.3 PG (ref 24–34)
MCHC RBC AUTO-ENTMCNC: 33.9 G/DL (ref 31–37)
MCV RBC AUTO: 74.7 FL (ref 74–97)
MONOCYTES # BLD: 0.3 K/UL (ref 0.05–1.2)
MONOCYTES NFR BLD: 3 % (ref 3–10)
NEUTS SEG # BLD: 7.3 K/UL (ref 1.8–8)
NEUTS SEG NFR BLD: 87 % (ref 40–73)
PLATELET # BLD AUTO: 151 K/UL (ref 135–420)
PMV BLD AUTO: 11.2 FL (ref 9.2–11.8)
POTASSIUM SERPL-SCNC: 4.5 MMOL/L (ref 3.5–5.5)
PROT SERPL-MCNC: 5.5 G/DL (ref 6.4–8.2)
PROTHROMBIN TIME: 17.8 SEC (ref 11.5–15.2)
RBC # BLD AUTO: 2.25 M/UL (ref 4.7–5.5)
SODIUM SERPL-SCNC: 142 MMOL/L (ref 136–145)
WBC # BLD AUTO: 8.5 K/UL (ref 4.6–13.2)

## 2017-10-04 PROCEDURE — 65270000029 HC RM PRIVATE

## 2017-10-04 PROCEDURE — 74011250636 HC RX REV CODE- 250/636: Performed by: INTERNAL MEDICINE

## 2017-10-04 PROCEDURE — 80053 COMPREHEN METABOLIC PANEL: CPT | Performed by: EMERGENCY MEDICINE

## 2017-10-04 PROCEDURE — 74011250636 HC RX REV CODE- 250/636: Performed by: EMERGENCY MEDICINE

## 2017-10-04 PROCEDURE — 30233N1 TRANSFUSION OF NONAUTOLOGOUS RED BLOOD CELLS INTO PERIPHERAL VEIN, PERCUTANEOUS APPROACH: ICD-10-PCS | Performed by: HOSPITALIST

## 2017-10-04 PROCEDURE — 86900 BLOOD TYPING SEROLOGIC ABO: CPT | Performed by: EMERGENCY MEDICINE

## 2017-10-04 PROCEDURE — 99285 EMERGENCY DEPT VISIT HI MDM: CPT

## 2017-10-04 PROCEDURE — 86920 COMPATIBILITY TEST SPIN: CPT | Performed by: EMERGENCY MEDICINE

## 2017-10-04 PROCEDURE — 82962 GLUCOSE BLOOD TEST: CPT

## 2017-10-04 PROCEDURE — 85025 COMPLETE CBC W/AUTO DIFF WBC: CPT | Performed by: EMERGENCY MEDICINE

## 2017-10-04 PROCEDURE — 93005 ELECTROCARDIOGRAM TRACING: CPT

## 2017-10-04 PROCEDURE — 85610 PROTHROMBIN TIME: CPT | Performed by: EMERGENCY MEDICINE

## 2017-10-04 PROCEDURE — 83036 HEMOGLOBIN GLYCOSYLATED A1C: CPT | Performed by: HOSPITALIST

## 2017-10-04 PROCEDURE — P9016 RBC LEUKOCYTES REDUCED: HCPCS | Performed by: EMERGENCY MEDICINE

## 2017-10-04 PROCEDURE — 36430 TRANSFUSION BLD/BLD COMPNT: CPT

## 2017-10-04 RX ORDER — SODIUM CHLORIDE 9 MG/ML
500 INJECTION, SOLUTION INTRAVENOUS ONCE
Status: COMPLETED | OUTPATIENT
Start: 2017-10-04 | End: 2017-10-04

## 2017-10-04 RX ORDER — ACETAMINOPHEN 325 MG/1
650 TABLET ORAL
Status: DISCONTINUED | OUTPATIENT
Start: 2017-10-04 | End: 2017-10-10 | Stop reason: HOSPADM

## 2017-10-04 RX ORDER — MORPHINE SULFATE 2 MG/ML
2 INJECTION, SOLUTION INTRAMUSCULAR; INTRAVENOUS
Status: DISCONTINUED | OUTPATIENT
Start: 2017-10-04 | End: 2017-10-05

## 2017-10-04 RX ORDER — SODIUM CHLORIDE 9 MG/ML
250 INJECTION, SOLUTION INTRAVENOUS AS NEEDED
Status: DISCONTINUED | OUTPATIENT
Start: 2017-10-04 | End: 2017-10-04

## 2017-10-04 RX ORDER — NALOXONE HYDROCHLORIDE 0.4 MG/ML
0.4 INJECTION, SOLUTION INTRAMUSCULAR; INTRAVENOUS; SUBCUTANEOUS AS NEEDED
Status: DISCONTINUED | OUTPATIENT
Start: 2017-10-04 | End: 2017-10-10 | Stop reason: HOSPADM

## 2017-10-04 RX ORDER — NOREPINEPHRINE BITARTRATE 1 MG/ML
INJECTION, SOLUTION INTRAVENOUS
Status: DISPENSED
Start: 2017-10-04 | End: 2017-10-05

## 2017-10-04 RX ORDER — DIGOXIN 250 MCG
0.25 TABLET ORAL DAILY
Status: DISCONTINUED | OUTPATIENT
Start: 2017-10-05 | End: 2017-10-05

## 2017-10-04 RX ORDER — GUAIFENESIN 100 MG/5ML
81 LIQUID (ML) ORAL DAILY
Status: DISCONTINUED | OUTPATIENT
Start: 2017-10-05 | End: 2017-10-05

## 2017-10-04 RX ORDER — LOSARTAN POTASSIUM 50 MG/1
100 TABLET ORAL DAILY
Status: DISCONTINUED | OUTPATIENT
Start: 2017-10-05 | End: 2017-10-05

## 2017-10-04 RX ORDER — SIMVASTATIN 40 MG/1
40 TABLET, FILM COATED ORAL
Status: DISCONTINUED | OUTPATIENT
Start: 2017-10-04 | End: 2017-10-06

## 2017-10-04 RX ORDER — FINASTERIDE 5 MG/1
5 TABLET, FILM COATED ORAL DAILY
Status: DISCONTINUED | OUTPATIENT
Start: 2017-10-05 | End: 2017-10-10 | Stop reason: HOSPADM

## 2017-10-04 RX ORDER — ONDANSETRON 2 MG/ML
4 INJECTION INTRAMUSCULAR; INTRAVENOUS
Status: DISCONTINUED | OUTPATIENT
Start: 2017-10-04 | End: 2017-10-05

## 2017-10-04 RX ORDER — DIPHENHYDRAMINE HYDROCHLORIDE 50 MG/ML
12.5 INJECTION, SOLUTION INTRAMUSCULAR; INTRAVENOUS
Status: DISCONTINUED | OUTPATIENT
Start: 2017-10-04 | End: 2017-10-05

## 2017-10-04 RX ORDER — CARVEDILOL 25 MG/1
25 TABLET ORAL 2 TIMES DAILY WITH MEALS
Status: DISCONTINUED | OUTPATIENT
Start: 2017-10-05 | End: 2017-10-05

## 2017-10-04 RX ORDER — FUROSEMIDE 40 MG/1
40 TABLET ORAL DAILY
Status: DISCONTINUED | OUTPATIENT
Start: 2017-10-05 | End: 2017-10-05

## 2017-10-04 RX ORDER — SPIRONOLACTONE 25 MG/1
25 TABLET ORAL DAILY
Status: DISCONTINUED | OUTPATIENT
Start: 2017-10-05 | End: 2017-10-05

## 2017-10-04 RX ORDER — ALLOPURINOL 300 MG/1
300 TABLET ORAL DAILY
Status: DISCONTINUED | OUTPATIENT
Start: 2017-10-05 | End: 2017-10-10 | Stop reason: HOSPADM

## 2017-10-04 RX ORDER — EZETIMIBE 10 MG/1
10 TABLET ORAL DAILY
Status: DISCONTINUED | OUTPATIENT
Start: 2017-10-05 | End: 2017-10-10 | Stop reason: HOSPADM

## 2017-10-04 RX ORDER — LOSARTAN POTASSIUM 25 MG/1
100 TABLET ORAL DAILY
Status: DISCONTINUED | OUTPATIENT
Start: 2017-10-05 | End: 2017-10-04 | Stop reason: CLARIF

## 2017-10-04 RX ORDER — ZOLPIDEM TARTRATE 5 MG/1
5 TABLET ORAL
Status: DISCONTINUED | OUTPATIENT
Start: 2017-10-04 | End: 2017-10-05

## 2017-10-04 RX ORDER — DOCUSATE SODIUM 100 MG/1
100 CAPSULE, LIQUID FILLED ORAL 2 TIMES DAILY
Status: DISCONTINUED | OUTPATIENT
Start: 2017-10-05 | End: 2017-10-10 | Stop reason: HOSPADM

## 2017-10-04 RX ORDER — HYDROCODONE BITARTRATE AND ACETAMINOPHEN 10; 325 MG/1; MG/1
1 TABLET ORAL
Status: DISCONTINUED | OUTPATIENT
Start: 2017-10-04 | End: 2017-10-05

## 2017-10-04 RX ADMIN — SODIUM CHLORIDE 500 ML: 900 INJECTION, SOLUTION INTRAVENOUS at 19:59

## 2017-10-04 RX ADMIN — SODIUM CHLORIDE 250 ML: 900 INJECTION, SOLUTION INTRAVENOUS at 15:20

## 2017-10-04 NOTE — ED TRIAGE NOTES
Pt reports to ED via [facility] due to generalized weakness, bi passed [place] due to lack of GI providers. Pt was discharged from [facility] yesterday. Dr. Dorita Richmond at bedside - rectal postive for occult stool.

## 2017-10-04 NOTE — Clinical Note
Status[de-identified] Inpatient [101] Type of Bed: Medical [8] Inpatient Hospitalization Certified Necessary for the Following Reasons: 3. Patient receiving treatment that can only be provided in an inpatient setting (further clarification in H&P documentation) Admitting Diagnosis: Symptomatic anemia [[id number]] Admitting Diagnosis: GI bleed [[id number]] Admitting Physician: Gabriella Gee Attending Physician: Antonio Nichols [[id number]] Estimated Length of Stay: 2 Midnights Discharge Plan[de-identified] Home with Office Follow-up

## 2017-10-04 NOTE — ED PROVIDER NOTES
HPI Comments: 1:14 PM  Waylon Keene is a 74 y.o. male with a PMHx of A-Fib, Pacemaker, CAD, CHF, DM, HTN who presents to the ED via EMS for evaluation of fatigue and blood in stool, onset this morning. Pt reports the had difficulty walking. Explains he went to the bathroom and couldn't make it back. Notes he like felt like falling backwards, \"my balance was completely gone. \" Instructed daughter to call EMS. Per wife, pt had an upper GI yesterday and EGD, no colonoscopy, and was discharged. States pt has constant shakes and BP was 155 mg/dL this AM. Pt is on Coumadin due to the pacemaker he has for A-Fib. When asked for h/o CVA, pt states \"I think I've been down that road. \" Pt denies being cold, abd pain, CP, SOB, light-headedness. No other acute symptoms or complaints were noted. PCP: King Violeta MD      The history is provided by the patient and the spouse. Past Medical History:   Diagnosis Date    Anemia     Atrial fibrillation (HCC)     BPH (benign prostatic hypertrophy)     CAD (coronary artery disease)     CHF (congestive heart failure) (HCC)     Diabetes (HCC)     Gout     Hyperlipidemia     Hypertension     Pacemaker     Vitamin D deficiency        Past Surgical History:   Procedure Laterality Date    COLONOSCOPY N/A 4/3/2017    COLONOSCOPY performed by Laurie Santoyo MD at [facility]    HX OTHER SURGICAL      colonoscopy    HX PACEMAKER      defibrillator    HX PACEMAKER PLACEMENT      HX TONSILLECTOMY           No family history on file. Social History     Social History    Marital status:      Spouse name: N/A    Number of children: N/A    Years of education: N/A     Occupational History    Not on file.      Social History Main Topics    Smoking status: Former Smoker     Quit date: 7/29/1981    Smokeless tobacco: Never Used    Alcohol use No    Drug use: No    Sexual activity: Not on file     Other Topics Concern    Not on file     Social History Narrative         ALLERGIES: Tradjenta [linagliptin] and Ace inhibitors    Review of Systems   Constitutional: Positive for fatigue. Negative for chills. Respiratory: Negative for shortness of breath. Cardiovascular: Negative for chest pain. Gastrointestinal: Positive for blood in stool. Negative for abdominal pain. Neurological: Negative for light-headedness. All other systems reviewed and are negative. Vitals:    10/04/17 1430 10/04/17 1445 10/04/17 1500 10/04/17 1515   BP: 125/56 100/65 113/62 108/61   Pulse: (!) 106 82 (!) 103 92   Resp: 20 15 14 16   Temp:       SpO2:  100% 100% 100%            Physical Exam   Constitutional: He is oriented to person, place, and time. He appears well-developed and well-nourished. HENT:   Head: Normocephalic and atraumatic. Neck: Neck supple. No JVD present. Cardiovascular: Normal rate and regular rhythm. Pulmonary/Chest: Effort normal and breath sounds normal. No respiratory distress. Abdominal: Soft. He exhibits no distension. There is no tenderness. There is no rebound and no guarding. Genitourinary: Rectal exam shows guaiac positive stool. Genitourinary Comments: Black stool, guaiac positive  No thrombosed hemorrhoids or active bleeding noted. Musculoskeletal: He exhibits no edema. Strength 5/5 x4 extremities  Gross sensation intact x4 extremities  Rapid alternating movements intact BL UE  Finger to nose intact BL  Heel to shin intact BL   Neurological: He is alert and oriented to person, place, and time. Skin: Skin is warm and dry. No erythema. Psychiatric: Judgment normal.   Nursing note and vitals reviewed. MDM  Number of Diagnoses or Management Options  Gastrointestinal hemorrhage, unspecified gastrointestinal hemorrhage type:   Symptomatic anemia:   Diagnosis management comments: 73 y/o male with upper GIB, s/p endoscopy yesterday, today with dark stool, blood per rectum and fatigue.      eval for anemia, check labs    No focal neuro deficits       Amount and/or Complexity of Data Reviewed  Clinical lab tests: ordered and reviewed      ED Course       Procedures    Vitals:  Patient Vitals for the past 12 hrs:   Temp Pulse Resp BP SpO2   10/04/17 1515 - 92 16 108/61 100 %   10/04/17 1500 - (!) 103 14 113/62 100 %   10/04/17 1445 - 82 15 100/65 100 %   10/04/17 1430 - (!) 106 20 125/56 -   10/04/17 1415 - 90 19 106/71 100 %   10/04/17 1400 - 85 17 118/64 100 %   10/04/17 1345 - 90 17 100/84 100 %   10/04/17 1330 - 98 17 115/63 100 %   10/04/17 1325 98 °F (36.7 °C) 90 18 118/87 98 %       Medications Ordered:  Medications   0.9% sodium chloride infusion 250 mL (250 mL IntraVENous New Bag 10/4/17 1520)       Lab Findings:  Recent Results (from the past 12 hour(s))   CBC WITH AUTOMATED DIFF    Collection Time: 10/04/17  2:35 PM   Result Value Ref Range    WBC 8.5 4.6 - 13.2 K/uL    RBC 2.25 (L) 4.70 - 5.50 M/uL    HGB 5.7 (LL) 13.0 - 16.0 g/dL    HCT 16.8 (LL) 36.0 - 48.0 %    MCV 74.7 74.0 - 97.0 FL    MCH 25.3 24.0 - 34.0 PG    MCHC 33.9 31.0 - 37.0 g/dL    RDW 16.3 (H) 11.6 - 14.5 %    PLATELET 640 250 - 661 K/uL    MPV 11.2 9.2 - 11.8 FL    NEUTROPHILS 87 (H) 40 - 73 %    LYMPHOCYTES 10 (L) 21 - 52 %    MONOCYTES 3 3 - 10 %    EOSINOPHILS 0 0 - 5 %    BASOPHILS 0 0 - 2 %    ABS. NEUTROPHILS 7.3 1.8 - 8.0 K/UL    ABS. LYMPHOCYTES 0.9 0.9 - 3.6 K/UL    ABS. MONOCYTES 0.3 0.05 - 1.2 K/UL    ABS. EOSINOPHILS 0.0 0.0 - 0.4 K/UL    ABS.  BASOPHILS 0.0 0.0 - 0.1 K/UL    DF AUTOMATED     TYPE & SCREEN    Collection Time: 10/04/17  2:35 PM   Result Value Ref Range    Crossmatch Expiration 10/07/2017     ABO/Rh(D) PENDING     Antibody screen PENDING    PROTHROMBIN TIME + INR    Collection Time: 10/04/17  2:35 PM   Result Value Ref Range    Prothrombin time 17.8 (H) 11.5 - 15.2 sec    INR 1.5 (H) 0.8 - 1.2     METABOLIC PANEL, COMPREHENSIVE    Collection Time: 10/04/17  2:35 PM   Result Value Ref Range    Sodium 142 136 - 145 mmol/L    Potassium 4.5 3.5 - 5.5 mmol/L    Chloride 110 (H) 100 - 108 mmol/L    CO2 24 21 - 32 mmol/L    Anion gap 8 3.0 - 18 mmol/L    Glucose 212 (H) 74 - 99 mg/dL    BUN 86 (H) 7.0 - 18 MG/DL    Creatinine 1.56 (H) 0.6 - 1.3 MG/DL    BUN/Creatinine ratio 55 (H) 12 - 20      GFR est AA 53 (L) >60 ml/min/1.73m2    GFR est non-AA 44 (L) >60 ml/min/1.73m2    Calcium 8.5 8.5 - 10.1 MG/DL    Bilirubin, total 0.6 0.2 - 1.0 MG/DL    ALT (SGPT) 18 16 - 61 U/L    AST (SGOT) 13 (L) 15 - 37 U/L    Alk. phosphatase 40 (L) 45 - 117 U/L    Protein, total 5.5 (L) 6.4 - 8.2 g/dL    Albumin 2.7 (L) 3.4 - 5.0 g/dL    Globulin 2.8 2.0 - 4.0 g/dL    A-G Ratio 1.0 0.8 - 1.7         Progress notes, consult notes, or additional procedure notes:  3:11 PM Discussed results with pt, need for blood transfusion. Pt is agreeable and would like to be admitted here. 3:35 PM Discussed patient's case and ED work up with Dr. Cooper Lezama [facility]) who accepts admission as documented. 340 PM Dr. Theda Bence, GI at bedside to evaluate pt. Diagnosis:   1. Gastrointestinal hemorrhage, unspecified gastrointestinal hemorrhage type    2. Symptomatic anemia        Disposition: Admit      Scribe Attestation      Susan Garber acting as a scribe for and in the presence of Amina Yancey DO      October 04, 2017 at [facility] PM       Provider Attestation:      I personally performed the services described in the documentation, reviewed the documentation, as recorded by the scribe in my presence, and it accurately and completely records my words and actions.  October 04, 2017 at 1:25 PM - Amina Yancey DO

## 2017-10-04 NOTE — ED NOTES
The patient states, \"I feel like I am full of gas. I don't hurt any where. BP 75/54. Hospitalist paged to inform of the patient's status.

## 2017-10-04 NOTE — CONSULTS
[facility]   [email or web address]      Impression:   1.recurrent gi bleeding likely due to upper tract disease, recent egd showed bleeding gastritis and shallow ulcer? This makes me wonder if there is another lesion here . 2. Severe acute anemia   3. cardiomyopathy with AICD in place  4. Pulmonary htn   5. 5cm thoracic aneurism  6. A fib   7. DM  8. Renal dysfunction       Plan:     1. Transfuse to hct around 23%  2. egd mac tomorrow all risks discussed   3. Give 4 units of FFP if rebleeding occurs   4. No asa or anti coagulation for now   5. PPI bid       Chief Complaint: melena and hematochezia       HPI:  Lacie Carlos is a 74 y.o. male who is being seen on consult for melena and hematochezia associated with near syncope but no abd pain . The pt was dc from [facility] yesterday after being scoped the day before for gi bleed and was found to have gastritis and a shallow gastric ulcer that was cauterized . No abd pain . Very weak and dizzy. No chest pain. PMH:   Past Medical History:   Diagnosis Date    Anemia     Atrial fibrillation (HCC)     BPH (benign prostatic hypertrophy)     CAD (coronary artery disease)     CHF (congestive heart failure) (HCC)     Diabetes (HCC)     Gout     Hyperlipidemia     Hypertension     Pacemaker     Vitamin D deficiency        PSH:   Past Surgical History:   Procedure Laterality Date    COLONOSCOPY N/A 4/3/2017    COLONOSCOPY performed by Quin Nolasco MD at [facility]    HX OTHER SURGICAL      colonoscopy    HX PACEMAKER      defibrillator    HX PACEMAKER PLACEMENT      HX TONSILLECTOMY         Social HX:   Social History     Social History    Marital status:      Spouse name: N/A    Number of children: N/A    Years of education: N/A     Occupational History    Not on file.      Social History Main Topics    Smoking status: Former Smoker     Quit date: 7/29/1981    Smokeless tobacco: Never Used    Alcohol use No    Drug use: No    Sexual activity: Not on file     Other Topics Concern    Not on file     Social History Narrative       FHX:   No family history on file. Allergy:   Allergies   Allergen Reactions    Tradjenta [Linagliptin] Anaphylaxis    Ace Inhibitors Angioedema       Home Medications:       (Not in a hospital admission)    Review of Systems:     Constitutional: No fevers, chills, weight loss, + dizziness and fatigue. Skin: No rashes, pruritis, jaundice, ulcerations, erythema. HENT: No headaches, nosebleeds, sinus pressure, rhinorrhea, sore throat. Eyes: No visual changes, blurred vision, eye pain, photophobia, jaundice. Cardiovascular: No chest pain, heart palpitations. Respiratory: No cough, + SOB, no wheezing, chest discomfort,or orthopnea. Gastrointestinal: Nausea melena and red blood per rectum    Genitourinary: No dysuria, bleeding, discharge, pyuria. Musculoskeletal: No weakness, arthralgias, wasting. Endo: No sweats. Heme: No bruising, easy bleeding. Allergies: As noted. Neurological: Cranial nerves intact. Alert and oriented. Gait not assessed. Dizzy   Psychiatric:  No anxiety, depression, hallucinations. Visit Vitals    /61    Pulse 92    Temp 98 °F (36.7 °C)    Resp 16    SpO2 100%       Physical Assessment:     constitutional: appearance: pale appearing , well developed, well nourished, normal habitus, no deformities, in no acute distress. skin: inspection: no rashes, ulcers, icterus or other lesions; no clubbing or telangiectasias. palpation: no induration or subcutaneos nodules. eyes: inspection: normal conjunctivae and lids; no jaundice pupils: symmetrical, normoreactive to light, normal accommodation and size. ENMT: mouth: normal oral mucosa,lips and gums; good dentition. oropharynx: normal tongue, hard and soft palate; posterior pharynx without erythema, exudate or lesions.    neck: no masses organomegaly or tenderness. respiratory: effort: normal chest excursion; no intercostal retraction or accessory muscle use. cardiovascular: abdominal aorta: normal size and position; no bruits. palpation: PMI of normal size and position; normal rhythm; no thrill 2/6 murmur  AICD noted . abdominal: abdomen: normal consistency; no tenderness or masses. hernias: no hernias appreciated. liver: normal size and consistency. spleen: not palpable. rectal: hemoccult/guaiac: not performed. musculoskeletal: no deformities or muscle wasting   lymphatic: axilae: not palpable. groin: not palpable. neck: within normal limits. other: not palpable. neurologic: cranial nerves: II-XII normal.   psychiatric: judgement/insight: within normal limits. memory: within normal limits for recent and remote events. mood and affect: no evidence of depression, anxiety or agitation. orientation: oriented to time, space and person. Basic Metabolic Profile   Recent Labs      10/04/17   1435   NA  142   K  4.5   CL  110*   CO2  24   BUN  86*   GLU  212*   CA  8.5         CBC w/Diff    Recent Labs      10/04/17   1435   WBC  8.5   RBC  2.25*   HGB  5.7*   HCT  16.8*   MCV  74.7   MCH  25.3   MCHC  33.9   RDW  16.3*   PLT  151    Recent Labs      10/04/17   1435   GRANS  87*   LYMPH  10*   EOS  0        Hepatic Function   Recent Labs      10/04/17   1435   ALB  2.7*   TP  5.5*   TBILI  0.6   SGOT  13*   AP  40*          Guerita Hernandez MD, M.D. [facility] 1949, [address] [facility]  [email or web address]

## 2017-10-04 NOTE — ED NOTES
Dr. Lencho Echeverria informed of the patient's BP of 77/48, heart rate of 120s and that the patient is currently receiving the second unit of RBCs. Order to IVF of 0.9% NS, 500 ml bolus (in addition to the RBCs) received, refer to [facility]

## 2017-10-04 NOTE — IP AVS SNAPSHOT
[address] Patient: Cary Soulier MRN: [id number] GTD:[id number] Current Discharge Medication List  
  
START taking these medications Dose & Instructions Dispensing Information Comments Morning Noon Evening Bedtime  
 amoxicillin 500 mg capsule Commonly known as:  AMOXIL Your last dose was: Your next dose is:    
   
   
 Dose:  1000 mg Take 2 Caps by mouth every twelve (12) hours for 12 days. Quantity:  48 Cap Refills:  0  
     
   
   
   
  
 clarithromycin 500 mg tablet Commonly known as:  Danyel Crumble Your last dose was: Your next dose is:    
   
   
 Dose:  500 mg Take 1 Tab by mouth two (2) times a day for 12 days. Quantity:  24 Tab Refills:  0 CONTINUE these medications which have CHANGED Dose & Instructions Dispensing Information Comments Morning Noon Evening Bedtime  
 pantoprazole 40 mg tablet Commonly known as:  PROTONIX What changed:  when to take this Your last dose was: Your next dose is:    
   
   
 Dose:  40 mg Take 1 Tab by mouth two (2) times a day. Quantity:  60 Tab Refills:  1 CONTINUE these medications which have NOT CHANGED Dose & Instructions Dispensing Information Comments Morning Noon Evening Bedtime  
 allopurinol 300 mg tablet Commonly known as:  Hernandez Bless Your last dose was: Your next dose is:    
   
   
 Dose:  150 mg Take 150 mg by mouth daily. Refills:  0 AMARYL 4 mg tablet Generic drug:  glimepiride Your last dose was: Your next dose is: Take  by mouth every morning. Refills:  0  
     
   
   
   
  
 aspirin 81 mg chewable tablet Your last dose was: Your next dose is:    
   
   
 Dose:  81 mg Take 81 mg by mouth daily. Refills:  0 carvedilol 25 mg tablet Commonly known as:  Aren Pintos Your last dose was: Your next dose is:    
   
   
 Dose:  25 mg Take 25 mg by mouth two (2) times daily (with meals). Refills:  0  
     
   
   
   
  
 finasteride 5 mg tablet Commonly known as:  PROSCAR Your last dose was: Your next dose is:    
   
   
 Dose:  5 mg Take 1 Tab by mouth daily. Quantity:  90 Tab Refills:  3  
     
   
   
   
  
 furosemide 40 mg tablet Commonly known as:  LASIX Your last dose was: Your next dose is: Take  by mouth daily. Refills:  0  
     
   
   
   
  
 losartan 100 mg tablet Commonly known as:  COZAAR Your last dose was: Your next dose is:    
   
   
 Dose:  100 mg Take 100 mg by mouth daily. Refills:  0  
     
   
   
   
  
 metFORMIN 1,000 mg tablet Commonly known as:  GLUCOPHAGE Your last dose was: Your next dose is:    
   
   
 Dose:  1000 mg Take 1,000 mg by mouth two (2) times daily (with meals). Refills:  0  
     
   
   
   
  
 multivitamin tablet Commonly known as:  ONE A DAY Your last dose was: Your next dose is:    
   
   
 Dose:  1 Tab Take 1 tablet by mouth daily. Refills:  0  
     
   
   
   
  
 spironolactone 25 mg tablet Commonly known as:  ALDACTONE Your last dose was: Your next dose is: Take  by mouth daily. Refills:  0  
     
   
   
   
  
 VITAMIN B-12 1,000 mcg tablet Generic drug:  cyanocobalamin Your last dose was: Your next dose is:    
   
   
 Dose:  1000 mcg Take 1,000 mcg by mouth daily. Refills:  0  
     
   
   
   
  
 VITAMIN C 1,000 mg tablet Generic drug:  ascorbic acid (vitamin C) Your last dose was: Your next dose is:    
   
   
 Dose:  1000 mg Take 1,000 mg by mouth. Refills:  0  
     
   
   
   
  
 VITAMIN D3 1,000 unit tablet Generic drug:  cholecalciferol Your last dose was: Your next dose is:    
   
   
 Dose:  1000 Units Take 1,000 Units by mouth daily. Refills:  0 ZETIA 10 mg tablet Generic drug:  ezetimibe Your last dose was: Your next dose is: Take  by mouth. Refills:  0 STOP taking these medications COUMADIN 2.5 mg tablet Generic drug:  warfarin LANOXIN 0.25 mg tablet Generic drug:  digoxin  
   
  
 metoprolol tartrate 100 mg IR tablet Commonly known as:  LOPRESSOR  
   
  
 ZOCOR 40 mg tablet Generic drug:  simvastatin Where to Get Your Medications Information on where to get these meds will be given to you by the nurse or doctor. ! Ask your nurse or doctor about these medications  
  amoxicillin 500 mg capsule  
 clarithromycin 500 mg tablet  
 pantoprazole 40 mg tablet

## 2017-10-04 NOTE — H&P
Hospitalist Admission History and Physical    NAME:  Khalif Au   :   1945   MRN:   [id number]     PCP:  Shakeel Ghotra MD  Date/Time:  10/4/2017 6:56 PM  Subjective:   CHIEF COMPLAINT:  Rectal bleeding    HISTORY OF PRESENT ILLNESS:     Lexa Vargas is a 74 y.o. male with history of recent EGD with cauterization for UGIB on 10/1 presents with smptomatic anemia a/w mild DOVE, weakness especially in lower extremities, and recurrent BRBPR which started again this morning. He was DC from [facility] with Hgb of 7.5 on 10/2 but today it is 5.7 causing the symptoms. No symptoms at rest.  Was heme (+) in ER. He also has h/o chronic AFIB on coumadin but INR is subtherapeutic. Past Medical History:   Diagnosis Date    Anemia     Atrial fibrillation (HCC)     BPH (benign prostatic hypertrophy)     CAD (coronary artery disease)     CHF (congestive heart failure) (HCC)     Diabetes (HCC)     Gout     Hyperlipidemia     Hypertension     Pacemaker     Vitamin D deficiency         Past Surgical History:   Procedure Laterality Date    COLONOSCOPY N/A 4/3/2017    COLONOSCOPY performed by Becky Pearson MD at [facility] ENDOSCOPY    HX OTHER SURGICAL      colonoscopy    HX PACEMAKER      defibrillator    HX PACEMAKER PLACEMENT      HX TONSILLECTOMY         Social History   Substance Use Topics    Smoking status: Former Smoker     Quit date: 1981    Smokeless tobacco: Never Used    Alcohol use No        No family history on file. Allergies   Allergen Reactions    Tradjenta [Linagliptin] Anaphylaxis    Ace Inhibitors Angioedema        Prior to Admission Medications   Prescriptions Last Dose Informant Patient Reported? Taking?   allopurinol (ZYLOPRIM) 300 mg tablet   Yes No   Sig: Take  by mouth daily. ascorbic acid, vitamin C, (VITAMIN C) 1,000 mg tablet   Yes No   Sig: Take 1,000 mg by mouth. aspirin 81 mg chewable tablet   Yes No   Sig: Take 81 mg by mouth daily.    carvedilol (COREG) 25 mg tablet   Yes No   Sig: Take 25 mg by mouth two (2) times daily (with meals). cholecalciferol (VITAMIN D3) 1,000 unit tablet   Yes No   Sig: Take 1,000 Units by mouth daily. cyanocobalamin (VITAMIN B-12) 1,000 mcg tablet   Yes No   Sig: Take 1,000 mcg by mouth daily. digoxin (LANOXIN) 0.25 mg tablet   Yes No   Sig: Take  by mouth daily. ezetimibe (ZETIA) 10 mg tablet   Yes No   Sig: Take  by mouth. finasteride (PROSCAR) 5 mg tablet   No No   Sig: Take 1 Tab by mouth daily. furosemide (LASIX) 40 mg tablet   Yes No   Sig: Take  by mouth daily. glimepiride (AMARYL) 4 mg tablet   Yes No   Sig: Take  by mouth every morning. losartan (COZAAR) 100 mg tablet   Yes No   Sig: Take 100 mg by mouth daily. metFORMIN (GLUCOPHAGE) 1,000 mg tablet   Yes No   Sig: Take 1,000 mg by mouth two (2) times daily (with meals). metoprolol (LOPRESSOR) 100 mg tablet   Yes No   Sig: Take  by mouth two (2) times a day. multivitamin (ONE A DAY) tablet   Yes No   Sig: Take 1 tablet by mouth daily. simvastatin (ZOCOR) 40 mg tablet   Yes No   Sig: Take  by mouth nightly. spironolactone (ALDACTONE) 25 mg tablet   Yes No   Sig: Take  by mouth daily. warfarin (COUMADIN) 2.5 mg tablet   Yes No   Sig: Take 2.5 mg by mouth daily.       Facility-Administered Medications: None       REVIEW OF SYSTEMS:     [] Unable to obtain  ROS due to  []mental status change  []sedated   []intubated   []Total of 12 systems reviewed as follows:  Constitutional: negative fever, negative chills, Positive for weakness  Eyes:   negative visual changes  ENT:   negative sore throat, tongue or lip swelling  Respiratory:  Mild exertional dyspnea  Cards:  negative for chest pain, palpitations, lower extremity edema  GI:   (+) rectal bleed but negative for nausea, vomiting, diarrhea, and abdominal pain  Genitourinary: negative for frequency, dysuria  Integument:  negative for rash and pruritus  Hematologic:  negative for easy bruising and gum/nose bleeding  Musculoskel: negative for myalgias,  back pain and muscle weakness  Neurological:  negative for headaches, dizziness, vertigo  Behavl/Psych: negative for feelings of anxiety, depression         Objective:   VITALS:    Visit Vitals    /63    Pulse 99    Temp 98.9 °F (37.2 °C)    Resp 23    SpO2 100%     Temp (24hrs), Av.6 °F (37 °C), Min:98 °F (36.7 °C), Max:99 °F (37.2 °C)      PHYSICAL EXAM:   General:    Alert, cooperative, no distress, appears stated age. Head:   Normocephalic, without obvious abnormality, atraumatic. Eyes:   Conjunctivae pale, anicteric sclerae. Pupils are equal  Nose:  Nares normal. No drainage or sinus. Throat:    Lips, mucosa, and tongue normal.  Neck:  Supple, symmetrical,  no adenopathy, thyroid: non tender    no carotid bruit and no JVD. Back:    Symmetric,  No CVA tenderness. Lungs:   Clear to auscultation bilaterally. No Wheezing or Rhonchi. No rales. Chest wall:  No tenderness or deformity. No Accessory muscle use. Heart:   S1-S2 (+) Irr-Irr  Abdomen:   Soft, nontender. BS present. Not distended. No masses palpated. Extremities: Extremities normal, atraumatic, No cyanosis. No edema. No clubbing  Skin:     Texture, turgor normal. No rashes or lesions. Not Jaundiced  Lymph nodes: Cervical, supraclavicular normal.  Psych:  Good insight. Not depressed. Not anxious or agitated. Neurologic: EOMs intact. No facial asymmetry. No aphasia or slurred speech. Normal strength, Alert and oriented X 3.        LAB DATA REVIEWED:    No components found for: [place]  Recent Labs      10/04/17   1435   NA  142   K  4.5   CL  110*   CO2  24   BUN  86*   CREA  1.56*   GLU  212*   CA  8.5   ALB  2.7*   WBC  8.5   HGB  5.7*   HCT  16.8*   PLT  151         IMAGING RESULTS:   []  I have personally reviewed the actual   []CXR  []AXR  []CT scan  [] MRI      Assessment/Plan:      Active Problems:    GI bleed (10/4/2017)      Symptomatic anemia (10/4/2017)      Rectal bleed (10/4/2017)    1. Symptomatic anemia secondary to GI bleed  2. Acute severe blood loss anemia secondary to upper GI bleed. 3. Cardiomyopathy with AICD in place: stable  4. History of Pulmonary HTN: Stable  5. Chronic atrial fibrillation on coumadin  6. 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 DNR    ___________________________________________________  PLAN:    Risk of deterioration:  []Low    []Moderate  [x]High    -  Admit for further workup and treatment. -  Will monitor labs as outlined below  -  Clears if tolerated otherwise NPO after midnight.  -  Supportive care and await consultant input.  - No NG tube for suction at this time. - Transfuse PRBCs  Meds:   -  PPI  -  Hold coumadin  -  accuchecks with ISS  -  GI & Mechanical DVT prophylaxis only due to bleeding  Labs:  Monitor H&H, CMP, CBC/DIFF, PT/INR, PTT  Drug levels: n/a  Cultures:  n/a  Extras:    -  EGD by GI? Consult(s):  -  Gastroenterology    **Chart reviewed including medications, vitals, notes, labs and pertinent studies. **Assessment, condition, and plan reviewed and all questions and concerns addressed.       Prophylaxis:  []Lovenox  []Coumadin  []Hep SQ  [x]SCDs  [x]H2B/PPI    Disposition:  [x]Home w/ Family   [] PT,OT,RN   []SNF/LTC   []SAH/Rehab    Discussed Code Status:    []Full Code      [x]DNR      []DNI     ___________________________________________________    Care Plan discussed with:    [x]Patient   [x]Family    []ED Care Manager  []ED Doc   []Specialist :    Total Time Coordinating Admission:      minutes    []Total Critical Care Time:     ___________________________________________________  Admitting Physician: Juju Burt MD

## 2017-10-04 NOTE — IP AVS SNAPSHOT
Roya Kortney 
 
 
 [address] [facility] [address] [place] [address] Patient: Ludy Winston MRN: [id number] [id number] You are allergic to the following Allergen Reactions Tradjenta (Linagliptin) Anaphylaxis Ace Inhibitors Angioedema Immunizations Administered for This Admission Name Date Influenza Vaccine (Quad) PF 10/10/2017 Recent Documentation Height Weight BMI Smoking Status 1.727 m 102.1 kg 34.23 kg/m2 Former Smoker Emergency Contacts Name Discharge Info Relation Home Work Mobile Shantal Rios NO [2] Other Relative [6] [phone] About your hospitalization You were admitted on:  October 4, 2017 You last received care in the:  [facility] [address] You were discharged on:  October 10, 2017 Unit phone number:  [phone] Why you were hospitalized Your primary diagnosis was:  Not on File Your diagnoses also included:  Gi Bleed, Symptomatic Anemia, Rectal Bleed, Acute Gastric Ulcer With Hemorrhage, Dieulafoy Lesion (Hemorrhagic) Of Stomach And Duodenum (Code) Providers Seen During Your Hospitalizations Provider Role Specialty Primary office phone Shara Jacob DO Attending Provider Emergency Medicine [phone] Clark Corrales MD Attending Provider Internal Medicine [phone] Your Primary Care Physician (PCP) Primary Care Physician Office Phone Office Fax [facility] [address] [facility] Follow-up Information Follow up With Details Comments Contact Info Pato Sierra MD On 10/13/2017 @ 9:45 [address] [facility] [address] [place] [address] 
[phone] Current Discharge Medication List  
  
START taking these medications Dose & Instructions Dispensing Information Comments Morning Noon Evening Bedtime  
 amoxicillin 500 mg capsule Commonly known as:  AMOXIL Your last dose was: Your next dose is:    
   
   
 Dose:  1000 mg Take 2 Caps by mouth every twelve (12) hours for 12 days. Quantity:  48 Cap Refills:  0  
     
   
   
   
  
 clarithromycin 500 mg tablet Commonly known as:  Carroll Crews Your last dose was: Your next dose is:    
   
   
 Dose:  500 mg Take 1 Tab by mouth two (2) times a day for 12 days. Quantity:  24 Tab Refills:  0 CONTINUE these medications which have CHANGED Dose & Instructions Dispensing Information Comments Morning Noon Evening Bedtime  
 pantoprazole 40 mg tablet Commonly known as:  PROTONIX What changed:  when to take this Your last dose was: Your next dose is:    
   
   
 Dose:  40 mg Take 1 Tab by mouth two (2) times a day. Quantity:  60 Tab Refills:  1 CONTINUE these medications which have NOT CHANGED Dose & Instructions Dispensing Information Comments Morning Noon Evening Bedtime  
 allopurinol 300 mg tablet Commonly known as:  Lakeshia Gosling Your last dose was: Your next dose is:    
   
   
 Dose:  150 mg Take 150 mg by mouth daily. Refills:  0 AMARYL 4 mg tablet Generic drug:  glimepiride Your last dose was: Your next dose is: Take  by mouth every morning. Refills:  0  
     
   
   
   
  
 aspirin 81 mg chewable tablet Your last dose was: Your next dose is:    
   
   
 Dose:  81 mg Take 81 mg by mouth daily. Refills:  0  
     
   
   
   
  
 carvedilol 25 mg tablet Commonly known as:  Cori Arsh Your last dose was: Your next dose is:    
   
   
 Dose:  25 mg Take 25 mg by mouth two (2) times daily (with meals). Refills:  0  
     
   
   
   
  
 finasteride 5 mg tablet Commonly known as:  PROSCAR Your last dose was: Your next dose is:    
   
   
 Dose:  5 mg Take 1 Tab by mouth daily. Quantity:  90 Tab Refills:  3  
     
   
   
   
  
 furosemide 40 mg tablet Commonly known as:  LASIX Your last dose was: Your next dose is: Take  by mouth daily. Refills:  0  
     
   
   
   
  
 losartan 100 mg tablet Commonly known as:  COZAAR Your last dose was: Your next dose is:    
   
   
 Dose:  100 mg Take 100 mg by mouth daily. Refills:  0  
     
   
   
   
  
 metFORMIN 1,000 mg tablet Commonly known as:  GLUCOPHAGE Your last dose was: Your next dose is:    
   
   
 Dose:  1000 mg Take 1,000 mg by mouth two (2) times daily (with meals). Refills:  0  
     
   
   
   
  
 multivitamin tablet Commonly known as:  ONE A DAY Your last dose was: Your next dose is:    
   
   
 Dose:  1 Tab Take 1 tablet by mouth daily. Refills:  0  
     
   
   
   
  
 spironolactone 25 mg tablet Commonly known as:  ALDACTONE Your last dose was: Your next dose is: Take  by mouth daily. Refills:  0  
     
   
   
   
  
 VITAMIN B-12 1,000 mcg tablet Generic drug:  cyanocobalamin Your last dose was: Your next dose is:    
   
   
 Dose:  1000 mcg Take 1,000 mcg by mouth daily. Refills:  0  
     
   
   
   
  
 VITAMIN C 1,000 mg tablet Generic drug:  ascorbic acid (vitamin C) Your last dose was: Your next dose is:    
   
   
 Dose:  1000 mg Take 1,000 mg by mouth. Refills:  0  
     
   
   
   
  
 VITAMIN D3 1,000 unit tablet Generic drug:  cholecalciferol Your last dose was: Your next dose is:    
   
   
 Dose:  1000 Units Take 1,000 Units by mouth daily. Refills:  0 ZETIA 10 mg tablet Generic drug:  ezetimibe Your last dose was: Your next dose is: Take  by mouth. Refills:  0 STOP taking these medications COUMADIN 2.5 mg tablet Generic drug:  warfarin LANOXIN 0.25 mg tablet Generic drug:  digoxin  
   
  
 metoprolol tartrate 100 mg IR tablet Commonly known as:  LOPRESSOR  
   
  
 ZOCOR 40 mg tablet Generic drug:  simvastatin Where to Get Your Medications Information on where to get these meds will be given to you by the nurse or doctor. ! Ask your nurse or doctor about these medications  
  amoxicillin 500 mg capsule  
 clarithromycin 500 mg tablet  
 pantoprazole 40 mg tablet Discharge Instructions Hold zocor and digoxin because of drug interactions with the antibiotic regimen you are on for treatment of your stomach infection with the bacteria H.pylori. You may resume these medications once you are done with your antibiotic regimen. Please follow up with your doctor in 7 days to check your hemoglobin and platelets both of which are low but improving. Return to the ED if you see blood in your stool or black stools. Do not take your coumadin for the next 2 weeks as it may increase your chances of having another gastrointestinal bleeding event, until then take aspirin daily. You may need to get back on coumadin to avoid getting a stroke and you will need to discuss that with either your heart doctor or your primary care doctor. Gastrointestinal Bleeding: Care Instructions Your Care Instructions The digestive or gastrointestinal tract goes from the mouth to the anus. It is often called the GI tract. Bleeding can happen anywhere in the GI tract. It may be caused by an ulcer, an infection, or cancer. It may also be caused by medicines such as aspirin or ibuprofen. Light bleeding may not cause any symptoms at first. But if you continue to bleed for a while, you may feel very weak or tired. Sudden, heavy bleeding means you need to see a doctor right away. This kind of bleeding can be very dangerous.  But it can usually be cured or controlled. The doctor may do some tests to find the cause of your bleeding. Follow-up care is a key part of your treatment and safety. Be sure to make and go to all appointments, and call your doctor if you are having problems. It's also a good idea to know your test results and keep a list of the medicines you take. How can you care for yourself at home? · Be safe with medicines. Take your medicines exactly as prescribed. Call your doctor if you think you are having a problem with your medicine. You will get more details on the specific medicines your doctor prescribes. · Do not take aspirin or other anti-inflammatory medicines, such as naproxen (Aleve) or ibuprofen (Advil, Motrin), without talking to your doctor first. Ask your doctor if it is okay to use acetaminophen (Tylenol). · Do not drink alcohol. · The bleeding may make you lose iron. So it's important to eat foods that have a lot of iron. These include red meat, shellfish, poultry, and eggs. They also include beans, raisins, whole-grain breads, and leafy green vegetables. If you want help planning meals, you can make an appointment with a dietitian. When should you call for help? Call 911 anytime you think you may need emergency care. For example, call if: 
· You have sudden, severe belly pain. · You vomit blood or what looks like coffee grounds. · You passed out (lost consciousness). · Your stools are maroon or very bloody. Call your doctor now or seek immediate medical care if: 
· You are dizzy or lightheaded, or you feel like you may faint. · Your stools are black and look like tar, or they have streaks of blood. · You have belly pain. · You vomit or have nausea. · You have trouble swallowing, or it hurts when you swallow. Watch closely for changes in your health, and be sure to contact your doctor if: 
· You do not get better as expected. Where can you learn more? Go to http://maninder.info/. Enter D839 in the search box to learn more about \"Gastrointestinal Bleeding: Care Instructions. \" Current as of: March 20, 2017 Content Version: 11.3 © 0699-4781 [employer]. Care instructions adapted under license by [employer] (which disclaims liability or warranty for this information). If you have questions about a medical condition or this instruction, always ask your healthcare professional. [address] any warranty or liability for your use of this information. Anemia: Care Instructions Your Care Instructions Anemia is a low level of red blood cells, which carry oxygen throughout your body. Many things can cause anemia. Lack of iron is one of the most common causes. Your body needs iron to make hemoglobin, a substance in red blood cells that carries oxygen from the lungs to your body's cells. Without enough iron, the body produces fewer and smaller red blood cells. As a result, your body's cells do not get enough oxygen, and you feel tired and weak. And you may have trouble concentrating. Bleeding is the most common cause of a lack of iron. You may have heavy menstrual bleeding or bleeding caused by conditions such as ulcers, hemorrhoids, or cancer. Regular use of aspirin or other anti-inflammatory medicines (such as ibuprofen) also can cause bleeding in some people. A lack of iron in your diet also can cause anemia, especially at times when the body needs more iron, such as during pregnancy, infancy, and the teen years. Your doctor may have prescribed iron pills. It may take several months of treatment for your iron levels to return to normal. Your doctor also may suggest that you eat foods that are rich in iron, such as meat and beans. There are many other causes of anemia. It is not always due to a lack of iron. Finding the specific cause of your anemia will help your doctor find the right treatment for you. Follow-up care is a key part of your treatment and safety. Be sure to make and go to all appointments, and call your doctor if you are having problems. It's also a good idea to know your test results and keep a list of the medicines you take. How can you care for yourself at home? · Take your medicines exactly as prescribed. Call your doctor if you think you are having a problem with your medicine. · If your doctor recommends iron pills, take them as directed: ¨ Try to take the pills on an empty stomach about 1 hour before or 2 hours after meals. But you may need to take iron with food to avoid an upset stomach. ¨ Do not take antacids or drink milk or caffeine drinks (such as coffee, tea, or cola) at the same time or within 2 hours of the time that you take your iron. They can make it hard for your body to absorb the iron. ¨ Vitamin C (from food or supplements) helps your body absorb iron. Try taking iron pills with a glass of orange juice or some other food that is high in vitamin C, such as citrus fruits. ¨ Iron pills may cause stomach problems, such as heartburn, nausea, diarrhea, constipation, and cramps. Be sure to drink plenty of fluids, and include fruits, vegetables, and fiber in your diet each day. Iron pills often make your bowel movements dark or green. ¨ If you forget to take an iron pill, do not take a double dose of iron the next time you take a pill. ¨ Keep iron pills out of the reach of small children. An overdose of iron can be very dangerous. · Follow your doctor's advice about eating iron-rich foods. These include red meat, shellfish, poultry, eggs, beans, raisins, whole-grain bread, and leafy green vegetables. · Steam vegetables to help them keep their iron content. When should you call for help? Call 911 anytime you think you may need emergency care. For example, call if: 
· You have symptoms of a heart attack. These may include: ¨ Chest pain or pressure, or a strange feeling in the chest. 
¨ Sweating. ¨ Shortness of breath. ¨ Nausea or vomiting. ¨ Pain, pressure, or a strange feeling in the back, neck, jaw, or upper belly or in one or both shoulders or arms. ¨ Lightheadedness or sudden weakness. ¨ A fast or irregular heartbeat. After you call 911, the  may tell you to chew 1 adult-strength or 2 to 4 low-dose aspirin. Wait for an ambulance. Do not try to drive yourself. · You passed out (lost consciousness). Call your doctor now or seek immediate medical care if: 
· You have new or increased shortness of breath. · You are dizzy or lightheaded, or you feel like you may faint. · Your fatigue and weakness continue or get worse. · You have any abnormal bleeding, such as: 
¨ Nosebleeds. ¨ Vaginal bleeding that is different (heavier, more frequent, at a different time of the month) than what you are used to. ¨ Bloody or black stools, or rectal bleeding. ¨ Bloody or pink urine. Watch closely for changes in your health, and be sure to contact your doctor if: 
· You do not get better as expected. Where can you learn more? Go to http://yaw-junie.info/. Enter R301 in the search box to learn more about \"Anemia: Care Instructions. \" Current as of: October 13, 2016 Content Version: 11.3 © 8995-3894 [employer]. Care instructions adapted under license by [employer] (which disclaims liability or warranty for this information). If you have questions about a medical condition or this instruction, always ask your healthcare professional. [place] [address] any warranty or liability for your use of this information. Patient armband removed and shredded MyChart Activation Thank you for requesting access to [facility]. Please follow the instructions below to securely access and download your online medical record.  [employer] allows you to send messages to your doctor, view your test results, renew your prescriptions, schedule appointments, and more. How Do I Sign Up? 1. In your internet browser, go to [email or web address] 
2. Click on the First Time User? Click Here link in the Sign In box. You will be redirect to the New Member Sign Up page. 3. Enter your [employer] Access Code exactly as it appears below. You will not need to use this code after youve completed the sign-up process. If you do not sign up before the expiration date, you must request a new code. [employer] Access Code: [email or web address] Expires: 2018  4:21 PM (This is the date your [employer] access code will ) 4. Enter the last four digits of your Social Security Number (xxxx) and Date of Birth (mm/dd/yyyy) as indicated and click Submit. You will be taken to the next sign-up page. 5. Create a [employer] ID. This will be your [employer] login ID and cannot be changed, so think of one that is secure and easy to remember. 6. Create a [employer] password. You can change your password at any time. 7. Enter your Password Reset Question and Answer. This can be used at a later time if you forget your password. 8. Enter your e-mail address. You will receive e-mail notification when new information is available in [address] 9. Click Sign Up. You can now view and download portions of your medical record. 10. Click the Download Summary menu link to download a portable copy of your medical information. Additional Information If you have questions, please visit the Frequently Asked Questions section of the [employer] website at [email or web address] Remember, [employer] is NOT to be used for urgent needs. For medical emergencies, dial 911. DISCHARGE SUMMARY from Nurse The following personal items are in your possession at time of discharge: 
 
Dental Appliances: None Visual Aid: None PATIENT INSTRUCTIONS: 
 
 
F-face looks uneven A-arms unable to move or move unevenly S-speech slurred or non-existent T-time-call 911 as soon as signs and symptoms begin-DO NOT go Back to bed or wait to see if you get better-TIME IS BRAIN. Warning Signs of HEART ATTACK Call 911 if you have these symptoms: 
? Chest discomfort. Most heart attacks involve discomfort in the center of the chest that lasts more than a few minutes, or that goes away and comes back. It can feel like uncomfortable pressure, squeezing, fullness, or pain. ? Discomfort in other areas of the upper body. Symptoms can include pain or discomfort in one or both arms, the back, neck, jaw, or stomach. ? Shortness of breath with or without chest discomfort. ? Other signs may include breaking out in a cold sweat, nausea, or lightheadedness. Don't wait more than five minutes to call [address]! Fast action can save your life. Calling 911 is almost always the fastest way to get lifesaving treatment. Emergency Medical Services staff can begin treatment when they arrive  up to an hour sooner than if someone gets to the hospital by car. The discharge information has been reviewed with the patient. The patient verbalized understanding. Discharge medications reviewed with the patient and appropriate educational materials and side effects teaching were provided. Discharge Instructions Attachments/References CLARITHROMYCIN (BY MOUTH) (ENGLISH) AMOXICILLIN (BY MOUTH) (ENGLISH) Discharge Orders None [facility] Announcement We are excited to announce that we are making your provider's discharge notes available to you in [employer]. You will see these notes when they are completed and signed by the physician that discharged you from your recent hospital stay. If you have any questions or concerns about any information you see in [employer], please call the Health Information Department where you were seen or reach out to your Primary Care Provider for more information about your plan of care. Introducing [facility]! Jana Stoner introduces [employer] patient portal. Now you can access parts of your medical record, email your doctor's office, and request medication refills online. 1. In your internet browser, go to [email or web address] 2. Click on the First Time User? Click Here link in the Sign In box. You will see the New Member Sign Up page. 3. Enter your [employer] Access Code exactly as it appears below. You will not need to use this code after youve completed the sign-up process. If you do not sign up before the expiration date, you must request a new code. · [employer] Access Code: [email or web address] Expires: 1/8/2018  4:21 PM 
 
4. Enter the last four digits of your Social Security Number (xxxx) and Date of Birth (mm/dd/yyyy) as indicated and click Submit. You will be taken to the next sign-up page. 5. Create a [employer] ID. This will be your [employer] login ID and cannot be changed, so think of one that is secure and easy to remember. 6. Create a [employer] password. You can change your password at any time. 7. Enter your Password Reset Question and Answer. This can be used at a later time if you forget your password. 8. Enter your e-mail address. You will receive e-mail notification when new information is available in [address] 9. Click Sign Up. You can now view and download portions of your medical record.  
10. Click the Download Summary menu link to download a portable copy of your medical information. If you have questions, please visit the Frequently Asked Questions section of the MyChart website. Remember, [employer] is NOT to be used for urgent needs. For medical emergencies, dial 911. Now available from your iPhone and Android! General Information Please provide this summary of care documentation to your next provider. Patient Signature:  ____________________________________________________________ Date:  ____________________________________________________________  
  
[facility] Provider Signature:  ____________________________________________________________ Date:  ____________________________________________________________ More Information Clarithromycin (By mouth) Clarithromycin ([id number]) Treats and prevents infections. Also treats duodenal ulcers caused by H pylori. This medicine is a macrolide antibiotic. Brand Name(s): Biaxin, Biaxin Filmtab, Biaxin XL, Omeclamox-Colin, Prevpac, Triple Therapy There may be other brand names for this medicine. When This Medicine Should Not Be Used: This medicine is not right for everyone. Do not use this medicine if you had an allergic reaction to clarithromycin, erythromycin, or similar medicines. How to Use This Medicine:  
Liquid, Tablet, Long Acting Tablet · Your doctor will tell you how much medicine to use. Do not use more than directed. Take it at the same time each day. · Take all of the medicine in your prescription to clear up your infection, even if you feel better after the first few doses. · If you take the extended-release tablet, part of the tablet may pass into your stools. This is normal and is nothing to worry about. · Extended-release tablets: Biaxin® XL extended-release tablets should be taken with food. Swallow the extended-release tablet whole. Do not break, crush, or chew it. · Tablets: These may be taken with or without food. Swallow the tablet whole. Do not break, crush, or chew it. · Liquid (suspension): This may also be taken with or without food. Shake the bottle of medicine well right before you measure each dose. Measure the oral liquid medicine with a marked measuring spoon, oral syringe, or medicine cup. · Missed dose: Take a dose as soon as you remember. If it is almost time for your next dose, wait until then and take a regular dose. Do not take extra medicine to make up for a missed dose. · Store the medicine in a closed container at room temperature, away from heat, moisture, and direct light. Do not refrigerate or freeze the oral liquid. Throw away any leftover oral liquid 14 days after you fill the prescription. Drugs and Foods to Avoid: Ask your doctor or pharmacist before using any other medicine, including over-the-counter medicines, vitamins, and herbal products. · Do not use this medicine if you also use cisapride, lovastatin, pimozide, simvastatin, or certain ergot medicines. Do use this medicine together with colchicine if you have kidney or liver disease, or with ranitidine bismuth citrate if you have kidney disease or a history of porphyria. · There are many other drugs that can interact with clarithromycin. Make sure your doctor knows about all other medicines you are using. · Some foods and medicines can affect how clarithromycin works. Tell your doctor if you also use Sola's wort, bromocriptine, cilostazol, colchicine, cyclosporine, digoxin, itraconazole, methylprednisolone, omeprazole, rifabutin, rifampicin, rifapentine, tacrolimus, theophylline, tolterodine, vinblastine.  Tell your doctor if you also use a calcium channel blocker (lowers blood pressure), medicine for heart rhythm problems (such as quinidine, procainamide, dofetilide, amiodarone, sotalol), medicine to lower cholesterol, medicine to treat diabetes, medicine to treat HIV or AIDS, medicine for seizures, medicine to treat impotence, a benzodiazepine (tranquilizer), or warfarin or another blood thinner. · If you also take zidovudine, clarithromycin and zidovudine should be taken at least 2 hours apart. Warnings While Using This Medicine: · Tell your doctor if you are pregnant or breastfeeding, or if you have a history of liver problems caused by clarithromycin or a history of heart rhythm problems. Tell your doctor if you have kidney disease, liver disease, diabetes, myasthenia gravis, or a history of porphyria. · This medicine may cause the following problems: 
¨ Changes in heart rhythm ¨ Liver problems ¨ Severe diarrhea · This medicine can cause diarrhea. Call your doctor if the diarrhea becomes severe, does not stop, or is bloody. Do not take any medicine to stop diarrhea until you have talked to your doctor. Diarrhea can occur 2 months or more after you stop taking this medicine. Diarrhea may occur 2 months or more after you stop using this medicine. · Your doctor will do lab tests at regular visits to check on the effects of this medicine. Keep all appointments. · Keep all medicine out of the reach of children. Never share your medicine with anyone. Possible Side Effects While Using This Medicine:  
Call your doctor right away if you notice any of these side effects: · Allergic reaction: Itching or hives, swelling in your face or hands, swelling or tingling in your mouth or throat, chest tightness, trouble breathing · Blistering, peeling, or red skin rash · Dark urine, pale stools, nausea, vomiting, loss of appetite, stomach pain, yellow skin or eyes · Fast, slow, pounding, or uneven heartbeat · Severe nausea, vomiting, upset stomach, abdominal pain · Severe diarrhea that does not stop If you notice other side effects that you think are caused by this medicine, tell your doctor. drink all of it to make sure you get all of the medicine. Do not chew or swallow the tablet for suspension. · Take all of the medicine in your prescription to clear up your infection, even if you feel better after the first few doses. · Take a dose as soon as you remember. If it is almost time for your next dose, wait until then and take a regular dose. Do not take extra medicine to make up for a missed dose. · Store the tablets, capsules, and tablets for suspension at room temperature, away from heat, moisture, and direct light. · Store the oral liquid in the refrigerator. Do not freeze. Throw away any unused medicine after 14 days. Drugs and Foods to Avoid: Ask your doctor or pharmacist before using any other medicine, including over-the-counter medicines, vitamins, and herbal products. · Some medicines can affect how amoxicillin works. Tell your doctor if you are also using any of the following: ¨ Allopurinol ¨ Probenecid ¨ Birth control pills ¨ A blood thinner Warnings While Using This Medicine: · Tell your doctor if you are pregnant or breastfeeding, or if you have kidney disease, allergies, or a condition called phenylketonuria (PKU). Tell your doctor if you are on dialysis. · This medicine can cause diarrhea. Call your doctor if the diarrhea becomes severe, does not stop, or is bloody. Do not take any medicine to stop diarrhea until you have talked to your doctor. Diarrhea can occur 2 months or more after you stop taking this medicine. · Tell any doctor or dentist who treats you that you are using this medicine. This medicine may affect certain medical test results. · Call your doctor if your symptoms do not improve or if they get worse. · Use this medicine to treat only the infection your doctor has prescribed it for. Do not use this medicine for any infection or condition that has not been checked by a doctor. This medicine will not treat the flu or the common cold. · Keep all medicine out of the reach of children. Never share your medicine with anyone. Possible Side Effects While Using This Medicine:  
Call your doctor right away if you notice any of these side effects: · Allergic reaction: Itching or hives, swelling in your face or hands, swelling or tingling in your mouth or throat, chest tightness, trouble breathing · Blistering, peeling, or red skin rash · Diarrhea that may contain blood, stomach cramps, fever If you notice these less serious side effects, talk with your doctor: · Mild diarrhea, nausea, or vomiting · Mild skin rash If you notice other side effects that you think are caused by this medicine, tell your doctor. Call your doctor for medical advice about side effects. You may report side effects to FDA at 0-607-FDA-1384 © 2017 [address] Information is for End User's use only and may not be sold, redistributed or otherwise used for commercial purposes. The above information is an  only. It is not intended as medical advice for individual conditions or treatments. Talk to your doctor, nurse or pharmacist before following any medical regimen to see if it is safe and effective for you.

## 2017-10-04 NOTE — ED NOTES
The patient's heart rate is 134 bpm.  The patient was observed laying on the stretcher, responsive to verbal commands. He was cool, diaphoretic, and incontinent of urine. He is answering questions appropriately. States, \"I don't feel bad, I just don't feel right. \"   His accu check was 207 mg/dL. BP 88/46. The patient was cleaned, then repositioned to comfort. First unit of RBCs completed. Will initiate the second unit. Will continue to monitor.

## 2017-10-05 ENCOUNTER — APPOINTMENT (OUTPATIENT)
Dept: GENERAL RADIOLOGY | Age: 72
DRG: 377 | End: 2017-10-05
Attending: EMERGENCY MEDICINE
Payer: MEDICARE

## 2017-10-05 ENCOUNTER — ANESTHESIA EVENT (OUTPATIENT)
Dept: ENDOSCOPY | Age: 72
DRG: 377 | End: 2017-10-05
Payer: MEDICARE

## 2017-10-05 ENCOUNTER — ANESTHESIA (OUTPATIENT)
Dept: ENDOSCOPY | Age: 72
DRG: 377 | End: 2017-10-05
Payer: MEDICARE

## 2017-10-05 PROBLEM — K25.0 ACUTE GASTRIC ULCER WITH HEMORRHAGE: Status: ACTIVE | Noted: 2017-10-05

## 2017-10-05 LAB
ALBUMIN SERPL-MCNC: 2.5 G/DL (ref 3.4–5)
ALBUMIN/GLOB SERPL: 1.2 {RATIO} (ref 0.8–1.7)
ALP SERPL-CCNC: 34 U/L (ref 45–117)
ALT SERPL-CCNC: 14 U/L (ref 16–61)
ANION GAP SERPL CALC-SCNC: 11 MMOL/L (ref 3–18)
APTT PPP: 26.6 SEC (ref 23–36.4)
AST SERPL-CCNC: 10 U/L (ref 15–37)
ATRIAL RATE: 70 BPM
BASOPHILS # BLD: 0 K/UL (ref 0–0.06)
BASOPHILS NFR BLD: 0 % (ref 0–2)
BILIRUB SERPL-MCNC: 0.5 MG/DL (ref 0.2–1)
BUN SERPL-MCNC: 107 MG/DL (ref 7–18)
BUN/CREAT SERPL: 56 (ref 12–20)
CALCIUM SERPL-MCNC: 8 MG/DL (ref 8.5–10.1)
CALCULATED R AXIS, ECG10: 149 DEGREES
CALCULATED T AXIS, ECG11: 50 DEGREES
CHLORIDE SERPL-SCNC: 117 MMOL/L (ref 100–108)
CO2 SERPL-SCNC: 19 MMOL/L (ref 21–32)
CREAT SERPL-MCNC: 1.92 MG/DL (ref 0.6–1.3)
DIAGNOSIS, 93000: NORMAL
DIFFERENTIAL METHOD BLD: ABNORMAL
EOSINOPHIL # BLD: 0 K/UL (ref 0–0.4)
EOSINOPHIL NFR BLD: 0 % (ref 0–5)
ERYTHROCYTE [DISTWIDTH] IN BLOOD BY AUTOMATED COUNT: 16.5 % (ref 11.6–14.5)
ERYTHROCYTE [DISTWIDTH] IN BLOOD BY AUTOMATED COUNT: 16.8 % (ref 11.6–14.5)
GLOBULIN SER CALC-MCNC: 2.1 G/DL (ref 2–4)
GLUCOSE BLD STRIP.AUTO-MCNC: 160 MG/DL (ref 70–110)
GLUCOSE BLD STRIP.AUTO-MCNC: 209 MG/DL (ref 70–110)
GLUCOSE BLD STRIP.AUTO-MCNC: 300 MG/DL (ref 70–110)
GLUCOSE SERPL-MCNC: 279 MG/DL (ref 74–99)
HCT VFR BLD AUTO: 17.1 % (ref 36–48)
HCT VFR BLD AUTO: 19.2 % (ref 36–48)
HCT VFR BLD AUTO: 21.4 % (ref 36–48)
HGB BLD-MCNC: 5.7 G/DL (ref 13–16)
HGB BLD-MCNC: 6.5 G/DL (ref 13–16)
HGB BLD-MCNC: 7.4 G/DL (ref 13–16)
INR PPP: 1.9 (ref 0.8–1.2)
LYMPHOCYTES # BLD: 2 K/UL (ref 0.9–3.6)
LYMPHOCYTES NFR BLD: 11 % (ref 21–52)
MCH RBC QN AUTO: 27.1 PG (ref 24–34)
MCH RBC QN AUTO: 27.7 PG (ref 24–34)
MCHC RBC AUTO-ENTMCNC: 33.9 G/DL (ref 31–37)
MCHC RBC AUTO-ENTMCNC: 34.1 G/DL (ref 31–37)
MCV RBC AUTO: 80 FL (ref 74–97)
MCV RBC AUTO: 81.1 FL (ref 74–97)
MONOCYTES # BLD: 1.1 K/UL (ref 0.05–1.2)
MONOCYTES NFR BLD: 6 % (ref 3–10)
NEUTS SEG # BLD: 14.5 K/UL (ref 1.8–8)
NEUTS SEG NFR BLD: 83 % (ref 40–73)
PLATELET # BLD AUTO: 113 K/UL (ref 135–420)
PLATELET # BLD AUTO: 148 K/UL (ref 135–420)
PMV BLD AUTO: 12.4 FL (ref 9.2–11.8)
PMV BLD AUTO: 12.5 FL (ref 9.2–11.8)
POTASSIUM SERPL-SCNC: 4.6 MMOL/L (ref 3.5–5.5)
PROT SERPL-MCNC: 4.6 G/DL (ref 6.4–8.2)
PROTHROMBIN TIME: 21.1 SEC (ref 11.5–15.2)
Q-T INTERVAL, ECG07: 398 MS
QRS DURATION, ECG06: 150 MS
QTC CALCULATION (BEZET), ECG08: 470 MS
RBC # BLD AUTO: 2.4 M/UL (ref 4.7–5.5)
RBC # BLD AUTO: 2.64 M/UL (ref 4.7–5.5)
SODIUM SERPL-SCNC: 147 MMOL/L (ref 136–145)
VENTRICULAR RATE, ECG03: 84 BPM
WBC # BLD AUTO: 14 K/UL (ref 4.6–13.2)
WBC # BLD AUTO: 17.6 K/UL (ref 4.6–13.2)

## 2017-10-05 PROCEDURE — 74011250636 HC RX REV CODE- 250/636

## 2017-10-05 PROCEDURE — 77030021678 HC GLIDESCP STAT DISP VERT -B

## 2017-10-05 PROCEDURE — 82962 GLUCOSE BLOOD TEST: CPT

## 2017-10-05 PROCEDURE — 80053 COMPREHEN METABOLIC PANEL: CPT | Performed by: HOSPITALIST

## 2017-10-05 PROCEDURE — 30233K1 TRANSFUSION OF NONAUTOLOGOUS FROZEN PLASMA INTO PERIPHERAL VEIN, PERCUTANEOUS APPROACH: ICD-10-PCS | Performed by: HOSPITALIST

## 2017-10-05 PROCEDURE — C1751 CATH, INF, PER/CENT/MIDLINE: HCPCS

## 2017-10-05 PROCEDURE — 0DB68ZX EXCISION OF STOMACH, VIA NATURAL OR ARTIFICIAL OPENING ENDOSCOPIC, DIAGNOSTIC: ICD-10-PCS | Performed by: INTERNAL MEDICINE

## 2017-10-05 PROCEDURE — 0W3P8ZZ CONTROL BLEEDING IN GASTROINTESTINAL TRACT, VIA NATURAL OR ARTIFICIAL OPENING ENDOSCOPIC: ICD-10-PCS | Performed by: INTERNAL MEDICINE

## 2017-10-05 PROCEDURE — 74000 XR ABD (KUB): CPT

## 2017-10-05 PROCEDURE — 74011000258 HC RX REV CODE- 258: Performed by: INTERNAL MEDICINE

## 2017-10-05 PROCEDURE — 85018 HEMOGLOBIN: CPT | Performed by: INTERNAL MEDICINE

## 2017-10-05 PROCEDURE — 77030018719 HC DRSG PTCH ANTIMIC J&J -A

## 2017-10-05 PROCEDURE — 77030019988 HC FCPS ENDOSC DISP BSC -B: Performed by: INTERNAL MEDICINE

## 2017-10-05 PROCEDURE — P9016 RBC LEUKOCYTES REDUCED: HCPCS | Performed by: EMERGENCY MEDICINE

## 2017-10-05 PROCEDURE — P9059 PLASMA, FRZ BETWEEN 8-24HOUR: HCPCS

## 2017-10-05 PROCEDURE — 74011250636 HC RX REV CODE- 250/636: Performed by: INTERNAL MEDICINE

## 2017-10-05 PROCEDURE — 74011000250 HC RX REV CODE- 250: Performed by: INTERNAL MEDICINE

## 2017-10-05 PROCEDURE — 74011636637 HC RX REV CODE- 636/637: Performed by: INTERNAL MEDICINE

## 2017-10-05 PROCEDURE — 74011250636 HC RX REV CODE- 250/636: Performed by: PHYSICIAN ASSISTANT

## 2017-10-05 PROCEDURE — 77030008771 HC TU NG SALEM SUMP -A

## 2017-10-05 PROCEDURE — 85730 THROMBOPLASTIN TIME PARTIAL: CPT | Performed by: HOSPITALIST

## 2017-10-05 PROCEDURE — 76040000019: Performed by: INTERNAL MEDICINE

## 2017-10-05 PROCEDURE — 74011250636 HC RX REV CODE- 250/636: Performed by: HOSPITALIST

## 2017-10-05 PROCEDURE — 36592 COLLECT BLOOD FROM PICC: CPT

## 2017-10-05 PROCEDURE — 77030008565 HC TBNG SUC IRR ERBE -B: Performed by: INTERNAL MEDICINE

## 2017-10-05 PROCEDURE — 76060000031 HC ANESTHESIA FIRST 0.5 HR: Performed by: INTERNAL MEDICINE

## 2017-10-05 PROCEDURE — 65610000006 HC RM INTENSIVE CARE

## 2017-10-05 PROCEDURE — 77030018846 HC SOL IRR STRL H20 ICUM -A: Performed by: INTERNAL MEDICINE

## 2017-10-05 PROCEDURE — 77030011256 HC DRSG MEPILEX <16IN NO BORD MOLN -A

## 2017-10-05 PROCEDURE — C9113 INJ PANTOPRAZOLE SODIUM, VIA: HCPCS | Performed by: INTERNAL MEDICINE

## 2017-10-05 PROCEDURE — 85027 COMPLETE CBC AUTOMATED: CPT | Performed by: INTERNAL MEDICINE

## 2017-10-05 PROCEDURE — 85025 COMPLETE CBC W/AUTO DIFF WBC: CPT | Performed by: HOSPITALIST

## 2017-10-05 PROCEDURE — 36430 TRANSFUSION BLD/BLD COMPNT: CPT

## 2017-10-05 PROCEDURE — 74011250637 HC RX REV CODE- 250/637: Performed by: HOSPITALIST

## 2017-10-05 PROCEDURE — 85610 PROTHROMBIN TIME: CPT | Performed by: HOSPITALIST

## 2017-10-05 PROCEDURE — 74011000258 HC RX REV CODE- 258: Performed by: PHYSICIAN ASSISTANT

## 2017-10-05 RX ORDER — NOREPINEPHRINE BITARTRATE/D5W 8 MG/250ML
2-16 PLASTIC BAG, INJECTION (ML) INTRAVENOUS
Status: DISCONTINUED | OUTPATIENT
Start: 2017-10-05 | End: 2017-10-09

## 2017-10-05 RX ORDER — SODIUM CHLORIDE 9 MG/ML
250 INJECTION, SOLUTION INTRAVENOUS AS NEEDED
Status: DISCONTINUED | OUTPATIENT
Start: 2017-10-05 | End: 2017-10-05

## 2017-10-05 RX ORDER — DOPAMINE HYDROCHLORIDE 160 MG/100ML
INJECTION, SOLUTION INTRAVENOUS
Status: COMPLETED
Start: 2017-10-05 | End: 2017-10-05

## 2017-10-05 RX ORDER — MAGNESIUM SULFATE 100 %
16 CRYSTALS MISCELLANEOUS AS NEEDED
Status: DISCONTINUED | OUTPATIENT
Start: 2017-10-05 | End: 2017-10-10 | Stop reason: HOSPADM

## 2017-10-05 RX ORDER — DOPAMINE HYDROCHLORIDE 160 MG/100ML
0-20 INJECTION, SOLUTION INTRAVENOUS
Status: DISCONTINUED | OUTPATIENT
Start: 2017-10-05 | End: 2017-10-05

## 2017-10-05 RX ORDER — PANTOPRAZOLE SODIUM 40 MG/1
40 TABLET, DELAYED RELEASE ORAL DAILY
Status: ON HOLD | COMMUNITY
End: 2017-10-10

## 2017-10-05 RX ORDER — GLIMEPIRIDE 4 MG/1
4 TABLET ORAL
Status: DISCONTINUED | OUTPATIENT
Start: 2017-10-05 | End: 2017-10-06

## 2017-10-05 RX ORDER — INSULIN LISPRO 100 [IU]/ML
INJECTION, SOLUTION INTRAVENOUS; SUBCUTANEOUS EVERY 6 HOURS
Status: DISCONTINUED | OUTPATIENT
Start: 2017-10-05 | End: 2017-10-10 | Stop reason: HOSPADM

## 2017-10-05 RX ORDER — DEXTROSE 50 % IN WATER (D50W) INTRAVENOUS SYRINGE
25-50 AS NEEDED
Status: DISCONTINUED | OUTPATIENT
Start: 2017-10-05 | End: 2017-10-10 | Stop reason: HOSPADM

## 2017-10-05 RX ADMIN — INSULIN LISPRO 6 UNITS: 100 INJECTION, SOLUTION INTRAVENOUS; SUBCUTANEOUS at 18:00

## 2017-10-05 RX ADMIN — Medication 16 MCG/MIN: at 03:35

## 2017-10-05 RX ADMIN — DOPAMINE HYDROCHLORIDE 5 MCG/KG/MIN: 160 INJECTION, SOLUTION INTRAVENOUS at 02:17

## 2017-10-05 RX ADMIN — CALCIUM GLUCONATE 1 G: 94 INJECTION, SOLUTION INTRAVENOUS at 15:17

## 2017-10-05 RX ADMIN — INSULIN LISPRO 9 UNITS: 100 INJECTION, SOLUTION INTRAVENOUS; SUBCUTANEOUS at 12:00

## 2017-10-05 RX ADMIN — SODIUM CHLORIDE 8 MG/HR: 900 INJECTION, SOLUTION INTRAVENOUS at 22:18

## 2017-10-05 RX ADMIN — SIMVASTATIN 40 MG: 40 TABLET, FILM COATED ORAL at 22:18

## 2017-10-05 RX ADMIN — ONDANSETRON 4 MG: 2 INJECTION INTRAMUSCULAR; INTRAVENOUS at 07:05

## 2017-10-05 RX ADMIN — INSULIN LISPRO 3 UNITS: 100 INJECTION, SOLUTION INTRAVENOUS; SUBCUTANEOUS at 23:47

## 2017-10-05 RX ADMIN — SODIUM CHLORIDE 8 MG/HR: 900 INJECTION, SOLUTION INTRAVENOUS at 13:00

## 2017-10-05 RX ADMIN — SODIUM CHLORIDE 1000 ML: 900 INJECTION, SOLUTION INTRAVENOUS at 01:55

## 2017-10-05 NOTE — ED NOTES
Patient postioned in high fowlers position for comfort as he began to gag, and he coughed up about 25-50ml's of evelina red blood, provider at bedside preparing to attempt central line placement

## 2017-10-05 NOTE — ED NOTES
Bedside report received from Constance Garcia, [facility], PT resting in bed with family at bedside, blood product and levophed infusing at this time, Ax4, denies pain, NG tube in place to suction with coffee ground material in tubing, safety intact, will continue to monitor

## 2017-10-05 NOTE — PROGRESS NOTES
met with patient while in the Emergency Department, completed the initial Spiritual Assessment of the patient, and offered Pastoral Care, see flow sheets for interventions. Pastoral support provided. Patient does not have any Yarsanism/cultural needs that will affect patients preferences in health care. Chart reviewed. Chaplains will continue to follow and will provide pastoral care on an as needed/as requested basis. Ej Killian MDiv.   Board Certified [employer] [phone]

## 2017-10-05 NOTE — ED NOTES
Patient positioned in high fowlers position, and he reports dizziness and slight diaphoresis, patient states, I feel like Im going to fall\" patient encouraged that he is safe and wont fall. Bed placed in lowest position, and patient repositioned to supine position. He continues to be alert and oriented at this time, denies any pain. Patient tolerated well, call bell placed in patients hand and instructed how to call for nurse.  Will continue to monitor

## 2017-10-05 NOTE — ED NOTES
Dr. Anselmo Hui paged and made aware patient's H/H 5.7/17. 1. Received telephone order to transfuse 2 more units of PRBC's. Will inform Kristen Bach RN and order blood transfusion.

## 2017-10-05 NOTE — PROGRESS NOTES
NUTRITION    Nutrition Screen      RECOMMENDATIONS / PLAN:     - Evaluate ability to tolerate po and start diet as medically appropriate.   - Continue RD inpatient monitoring and evaluation. NUTRITION INTERVENTIONS & DIAGNOSIS:     [] Meals/Snacks: modified diet, NPO  [x] Coordination of care: interdisciplinary rounds     Nutrition Diagnosis: Inadequate oral intake related to inability to tolerate po with altered GI function as evidenced by pt NPO with GI bleed and planned procedure. ASSESSMENT:     Pt NPO with GI bleed and plan for endoscopy today. Average po intake adequate to meet patients estimated nutritional needs:   [] Yes     [x] No   [] Unable to determine at this time    Diet: DIET NPO      Food Allergies: NKFA  Current Appetite:   [] Good     [] Fair     [] Poor     [x] Other: NPO  Appetite/meal intake prior to admission:   [] Good     [] Fair     [] Poor     [x] Other: unknown   Feeding Limitations:  [] Swallowing difficulty    [] Chewing difficulty    [] Other:  Current Meal Intake: No data found. NGT to intermittent suction; hematemesis    BM: 10/5, bright red blood per RN   Skin Integrity: WDL  Edema: none   Pertinent Medications: Reviewed: calcium gluconate, colace, lasix, SSI, levophed, Zofran, protonix    Recent Labs      10/04/17   1435   NA  142   K  4.5   CL  110*   CO2  24   GLU  212*   BUN  86*   CREA  1.56*   CA  8.5   ALB  2.7*   SGOT  13*   ALT  18       Intake/Output Summary (Last 24 hours) at 10/05/17 1210  Last data filed at 10/05/17 1000   Gross per 24 hour   Intake            396.3 ml   Output              225 ml   Net            171.3 ml       Anthropometrics:  Ht Readings from Last 1 Encounters:   10/05/17 5' 8\" (1.727 m)     Last 3 Recorded Weights in this Encounter    10/05/17 0211   Weight: 99.8 kg (220 lb)     Body mass index is 33.45 kg/(m^2).       Obese, Class I     Weight History:   Weight Metrics 10/5/2017 4/25/2017 4/3/2017 4/22/2016 10/21/2015 4/30/2015 2/19/2015   Weight 220 lb 228 lb 228 lb 244 lb 244 lb 252 lb 252 lb   BMI 33.45 kg/m2 34.16 kg/m2 34.16 kg/m2 37.11 kg/m2 37.11 kg/m2 38.33 kg/m2 38.33 kg/m2        Admitting Diagnosis: Symptomatic anemia  GI bleed  UPPER GI BLEED  Rectal bleed  Symptomatic anemia  Pertinent PMHx: CAD, CHF, DM, HTN, HLD    Education Needs:        [x] None identified  [] Identified - Not appropriate at this time  []  Identified and addressed - refer to education log  Learning Limitations:   [x] None identified  [] Identified    Cultural, Temple & ethnic food preferences:  [x] None identified    [] Identified and addressed     ESTIMATED NUTRITION NEEDS:     Calories: 8057-3132 kcal (MSJx1.2-1.3) based on  [x] Actual  kg     [] IBW   Protein:  gm (0.8-1 gm/kg) based on  [x] Actual BW      [] IBW   Fluid: 1 mL/kcal     MONITORING & EVALUATION:     Nutrition Goal(s):   1. Po intake of meals will meet >75% of patient estimated nutritional needs within the next 7 days.   Outcome:  [] Met/Ongoing    []  Not Met    [x] New/Initial Goal     Monitoring:   [x] Diet tolerance   [x] Meal intake   [] Supplement intake   [] GI symptoms/ability to tolerate po diet   [] Respiratory status   [] Plan of care      Previous Recommendations (for follow-up assessments only):     []   Implemented       []   Not Implemented (RD to address)     [] No Recommendation Made     Discharge Planning: cardiac, diabetic diet as tolerated   [x] Participated in care planning, discharge planning, & interdisciplinary rounds as appropriate      Damian Strong, [address] [place] [address]    Pager: [phone]

## 2017-10-05 NOTE — CONSULTS
[facility]  Pulmonary, Critical Care, and Sleep Medicine    Name: Ar Cobb MRN: [id number]   : 1945 Hospital: [address] [place] [address] Dr   Date: 10/5/2017        Critical Care Initial Patient Consult    Citlalli Singer is a 74 y.o. male with history of recent EGD with cauterization for UGIB due to gastritison 10/1 presents with symptomatic anemia a/w mild DOVE, dizziness, fell off toilet, weakness especially in lower extremities, and recurrent BRBPR this morning. Patient felt \"gurgling in intestines\", had melena and later BRBPR He was DC from [facility] with Hgb of 7.5 on 10/2. On arrival was 5.7. Was heme (+) in ER. In ER he complained of dizziness with bed at 30 degrees. Patient briefly required levophed infusion while blood transfusions completed. NGT palced in ER with dark/coffeeground fluid. PMH: AICD, pulmonary htn, 5 cm TAA, DM, renal failure, chronic AFIB on coumadin but INR is subtherapeutic. IMPRESSION:   Severe UGI bleed, gastritis; s/p 6 U PRBCs and 1 U FFP, Calcium 1gm  Anemia of blood loss, dizziness  Cardiomyopathy, AFib on chronic coumadin, AICD  Renal failure  DM     Patient Active Problem List   Diagnosis Code    Hypertension I10    Diabetes ([facility] [address] E11.9    Hyperlipidemia E78.5    Gout M10.9    Pacemaker Z95.0    Vitamin D deficiency E55.9    CHF (congestive heart failure) ([facility]) I50.9    BPH (benign prostatic hypertrophy) N40.0    Anemia D64.9    Atrial fibrillation (HCC) I48.91    GI bleed K92.2    Symptomatic anemia D64.9    Rectal bleed K62.5    Acute gastric ulcer with hemorrhage K25.0        RECOMMENDATIONS:   · Follow h/h q6h  · Maintain Hb >7  · GI consult following, Dr. Harriet Hays input appreciated. · 1 U FFP today, and if rebleeds 4 U FFP  · 1 gm calcium  · Hold anticoagulation and ASA at this time  · PPI BID, protonix drip if GI approves. Subjective/History:      This patient has been seen and evaluated at the request of Dr. Alexander Fox for GI bleed. 10/05/17    Patient is a 74 y.o. male     The patient is critically ill and can not provide additional history due to unreliability. Past Medical History:   Diagnosis Date    Anemia     Atrial fibrillation (HCC)     BPH (benign prostatic hypertrophy)     CAD (coronary artery disease)     CHF (congestive heart failure) (HCC)     Diabetes ([facility] [address]     Gout     Hyperlipidemia     Hypertension     Pacemaker     Vitamin D deficiency       Past Surgical History:   Procedure Laterality Date    COLONOSCOPY N/A 4/3/2017    COLONOSCOPY performed by Ngoc Rose MD at [facility] ENDOSCOPY    HX OTHER SURGICAL      colonoscopy    HX PACEMAKER      defibrillator    HX PACEMAKER PLACEMENT      HX TONSILLECTOMY        Prior to Admission medications    Medication Sig Start Date End Date Taking? Authorizing Provider   finasteride (PROSCAR) 5 mg tablet Take 1 Tab by mouth daily. 4/25/17   Dwight Cowart MD   cholecalciferol (VITAMIN D3) 1,000 unit tablet Take 1,000 Units by mouth daily. Historical Provider   ascorbic acid, vitamin C, (VITAMIN C) 1,000 mg tablet Take 1,000 mg by mouth. Historical Provider   carvedilol (COREG) 25 mg tablet Take 25 mg by mouth two (2) times daily (with meals). Historical Provider   warfarin (COUMADIN) 2.5 mg tablet Take 2.5 mg by mouth daily. Historical Provider   metFORMIN (GLUCOPHAGE) 1,000 mg tablet Take 1,000 mg by mouth two (2) times daily (with meals). Historical Provider   digoxin (LANOXIN) 0.25 mg tablet Take  by mouth daily. Historical Provider   simvastatin (ZOCOR) 40 mg tablet Take  by mouth nightly. Historical Provider   allopurinol (ZYLOPRIM) 300 mg tablet Take  by mouth daily. Historical Provider   spironolactone (ALDACTONE) 25 mg tablet Take  by mouth daily. Historical Provider   furosemide (LASIX) 40 mg tablet Take  by mouth daily. Historical Provider   losartan (COZAAR) 100 mg tablet Take 100 mg by mouth daily.     Historical Provider metoprolol (LOPRESSOR) 100 mg tablet Take  by mouth two (2) times a day. Historical Provider   ezetimibe (ZETIA) 10 mg tablet Take  by mouth. Historical Provider   glimepiride (AMARYL) 4 mg tablet Take  by mouth every morning. Historical Provider   cyanocobalamin (VITAMIN B-12) 1,000 mcg tablet Take 1,000 mcg by mouth daily. Historical Provider   multivitamin (ONE A DAY) tablet Take 1 tablet by mouth daily. Historical Provider   aspirin 81 mg chewable tablet Take 81 mg by mouth daily. Historical Provider     Current Facility-Administered Medications   Medication Dose Route Frequency    DOPamine in 5 % dextrose (INTROPIN) 800 mg/500 mL (1,600 mcg/mL) infusion  0-20 mcg/kg/min IntraVENous TITRATE    NOREPINephrine (LEVOPHED) 8,000 mcg in dextrose 5% 250 mL infusion  2-16 mcg/min IntraVENous TITRATE    pantoprazole (PROTONIX) 80 mg in 0.9% sodium chloride 100 mL infusion  8 mg/hr IntraVENous CONTINUOUS    glimepiride (AMARYL) tablet 4 mg  4 mg Oral 7am    insulin lispro (HUMALOG) injection   SubCUTAneous Q6H    carvedilol (COREG) tablet 25 mg  25 mg Oral BID WITH MEALS    allopurinol (ZYLOPRIM) tablet 300 mg  300 mg Oral DAILY    digoxin (LANOXIN) tablet 0.25 mg  0.25 mg Oral DAILY    ezetimibe (ZETIA) tablet 10 mg  10 mg Oral DAILY    finasteride (PROSCAR) tablet 5 mg  5 mg Oral DAILY    furosemide (LASIX) tablet 40 mg  40 mg Oral DAILY    simvastatin (ZOCOR) tablet 40 mg  40 mg Oral QHS    spironolactone (ALDACTONE) tablet 25 mg  25 mg Oral DAILY    docusate sodium (COLACE) capsule 100 mg  100 mg Oral BID     Allergies   Allergen Reactions    Tradjenta [Linagliptin] Anaphylaxis    Ace Inhibitors Angioedema      Social History   Substance Use Topics    Smoking status: Former Smoker     Quit date: 7/29/1981    Smokeless tobacco: Never Used    Alcohol use No      No family history on file. Review of Systems:  Review of systems not obtained due to patient factors.     Objective: Vital Signs:    Visit Vitals    /58    Pulse 89    Temp 98.5 °F (36.9 °C)    Resp 15    Ht 5' 8\" (1.727 m)    Wt 99.8 kg (220 lb)    SpO2 98%    BMI 33.45 kg/m2       O2 Device: Room air       Temp (24hrs), Av.4 °F (36.9 °C), Min:97.4 °F (36.3 °C), Max:99 °F (37.2 °C)       Intake/Output:   Last shift:      10/05 0701 - 10/05 1900  In: 86.3 [I.V.:86.3]  Out: 225 [Urine:225]  Last 3 shifts: 10/03 1901 - 10/05 0700  In: 310   Out: -     Intake/Output Summary (Last 24 hours) at 10/05/17 1742  Last data filed at 10/05/17 1000   Gross per 24 hour   Intake             86.3 ml   Output              225 ml   Net           -138.7 ml         Ventilator Settings: n/a    Physical Exam:     General:  Alert, cooperative, no distress. Head:  Normocephalic, without obvious abnormality, atraumatic. Eyes:  Conjunctivae/corneas clear. PERRL,   Nose: Nares normal. Septum midline. Mucosa normal. No drainage or sinus tenderness. Throat: Lips, mucosa, and tongue normal. Teeth and gums normal.   Neck: Supple, symmetrical, trachea midline, no adenopathy, thyroid: no enlargment/tenderness/nodules, no carotid bruit and no JVD. Back:   Symmetric, no curvature. ROM normal.   Lungs:   Clear to auscultation bilaterally. Chest wall:  No tenderness or deformity. Heart:  Regular rate and rhythm, v-paced, S1, S2 normal, no murmur, click, rub or gallop. Abdomen:   Soft, non-tender. Bowel sounds normal. No masses,  No organomegaly. Extremities: Extremities normal, atraumatic, no cyanosis or edema. Pulses: 2+ and symmetric all extremities.    Skin: Skin color, texture, turgor normal. No rashes or lesions   Lymph nodes:      Cervical, supraclavicular, and axillary nodes normal.   Neurologic: Grossly nonfocal         Data:     Recent Results (from the past 24 hour(s))   EKG, 12 LEAD, SUBSEQUENT    Collection Time: 10/04/17  5:54 PM   Result Value Ref Range    Ventricular Rate 84 BPM    Atrial Rate 70 BPM    QRS Duration 150 ms    Q-T Interval 398 ms    QTC Calculation (Bezet) 470 ms    Calculated R Axis 149 degrees    Calculated T Axis 50 degrees    Diagnosis       Ventricular-paced rhythm with occasional premature ventricular complexes  Abnormal ECG  When compared with ECG of 03-APR-2017 09:07,  premature ventricular complexes are now present  Vent. rate has increased BY  11 BPM     GLUCOSE, POC    Collection Time: 10/04/17  7:05 PM   Result Value Ref Range    Glucose (POC) 203 (H) 70 - 110 mg/dL   HGB & HCT    Collection Time: 10/05/17 12:05 AM   Result Value Ref Range    HGB 5.7 (LL) 13.0 - 16.0 g/dL    HCT 17.1 (LL) 36.0 - 48.0 %   FFP, ALLOCATE    Collection Time: 10/05/17 10:15 AM   Result Value Ref Range    CALLED TO: CCU JEFFERY CAROLE ON 10/05/2017 AT 1048 BY [id number]. Unit number [id number]     Blood component type FP24H,THAW     Unit division 00     Status of unit ISSUED    CBC WITH AUTOMATED DIFF    Collection Time: 10/05/17 10:30 AM   Result Value Ref Range    WBC 17.6 (H) 4.6 - 13.2 K/uL    RBC 2.40 (L) 4.70 - 5.50 M/uL    HGB 6.5 (L) 13.0 - 16.0 g/dL    HCT 19.2 (L) 36.0 - 48.0 %    MCV 80.0 74.0 - 97.0 FL    MCH 27.1 24.0 - 34.0 PG    MCHC 33.9 31.0 - 37.0 g/dL    RDW 16.8 (H) 11.6 - 14.5 %    PLATELET 156 738 - 044 K/uL    MPV 12.5 (H) 9.2 - 11.8 FL    NEUTROPHILS 83 (H) 40 - 73 %    LYMPHOCYTES 11 (L) 21 - 52 %    MONOCYTES 6 3 - 10 %    EOSINOPHILS 0 0 - 5 %    BASOPHILS 0 0 - 2 %    ABS. NEUTROPHILS 14.5 (H) 1.8 - 8.0 K/UL    ABS. LYMPHOCYTES 2.0 0.9 - 3.6 K/UL    ABS. MONOCYTES 1.1 0.05 - 1.2 K/UL    ABS. EOSINOPHILS 0.0 0.0 - 0.4 K/UL    ABS.  BASOPHILS 0.0 0.0 - 0.06 K/UL    DF AUTOMATED     PROTHROMBIN TIME + INR    Collection Time: 10/05/17 10:30 AM   Result Value Ref Range    Prothrombin time 21.1 (H) 11.5 - 15.2 sec    INR 1.9 (H) 0.8 - 1.2     PTT    Collection Time: 10/05/17 10:30 AM   Result Value Ref Range    aPTT 26.6 23.0 - 36.4 SEC   GLUCOSE, POC    Collection Time: 10/05/17 11:29 AM Result Value Ref Range    Glucose (POC) 300 (H) 70 - 616 mg/dL   METABOLIC PANEL, COMPREHENSIVE    Collection Time: 10/05/17 11:32 AM   Result Value Ref Range    Sodium 147 (H) 136 - 145 mmol/L    Potassium 4.6 3.5 - 5.5 mmol/L    Chloride 117 (H) 100 - 108 mmol/L    CO2 19 (L) 21 - 32 mmol/L    Anion gap 11 3.0 - 18 mmol/L    Glucose 279 (H) 74 - 99 mg/dL     (H) 7.0 - 18 MG/DL    Creatinine 1.92 (H) 0.6 - 1.3 MG/DL    BUN/Creatinine ratio 56 (H) 12 - 20      GFR est AA 42 (L) >60 ml/min/1.73m2    GFR est non-AA 34 (L) >60 ml/min/1.73m2    Calcium 8.0 (L) 8.5 - 10.1 MG/DL    Bilirubin, total 0.5 0.2 - 1.0 MG/DL    ALT (SGPT) 14 (L) 16 - 61 U/L    AST (SGOT) 10 (L) 15 - 37 U/L    Alk. phosphatase 34 (L) 45 - 117 U/L    Protein, total 4.6 (L) 6.4 - 8.2 g/dL    Albumin 2.5 (L) 3.4 - 5.0 g/dL    Globulin 2.1 2.0 - 4.0 g/dL    A-G Ratio 1.2 0.8 - 1.7     GLUCOSE, POC    Collection Time: 10/05/17  4:28 PM   Result Value Ref Range    Glucose (POC) 209 (H) 70 - 110 mg/dL           No results for input(s): FIO2I, IFO2, HCO3I, IHCO3, HCOPOC, PCO2I, PCOPOC, IPHI, PHI, PHPOC, PO2I, PO2POC in the last 72 hours. No lab exists for component: IPOC2  Telemetry:normal sinus rhythm    Imaging:  I have personally reviewed the patients radiographs and have reviewed the reports:  CXR Results  (Last 48 hours)    None              Total of  35  min critical care time spent at bedside during the course of care providing evaluation,management and care decisions and ordering appropriate treatment related to critical care problems exclusive of procedures. The reason for providing this level of medical care for this critically ill patient was due a critical illness that impaired one or more vital organ systems such that there was a high probability of imminent or life threatening deterioration in the patients condition.  This care involved high complexity decision making to assess, manipulate, and support vital system functions, to treat this degree vital organ system failure and to prevent further life threatening deterioration of the patients condition.     Andrea Pierce MD

## 2017-10-05 NOTE — DIABETES MGMT
GLYCEMIC CONTROL PLAN OF CARE    Assessment/Recommendations:  Pt discussed in interdisciplinary rounds. Pt is a 74year old male with a past medical history significant for anemia, afib, CHF, CAD, hypertension, and diabetes. Blood glucose elevated above targets. Correctional Lispro ordered, monitor need to advance to the very insulin resistant scale  Recommend discontinuing Glimepiride while pt is hospitalized.      Most recent blood glucose values: 203, 300 mg/dL     Current A1C of <3.5%     Current hospital diabetes medications:   Correctional Lispro insulin every 6 hours   Glimepiride 4 mg daily     Home diabetes medications:  Metformin 1,000 mg two times daily   Glimepiride 4 mg every morning     Diet:  NPO    Education:  ____Refer to Diabetes Education Record             __x__Education not indicated at this time      Ren Forman RD, CDE

## 2017-10-05 NOTE — ROUTINE PROCESS
Received pt in bed with eyes closed. Pt easily aroused. Voiced no complaint. Call bell within reach. Bed low and locked. Will continue to monitor    2121 Small foul swelly burgedy stool. Bedside and Verbal shift change report given to Janki DUFFY (oncoming nurse) by Chan Ca RN   (offgoing nurse). Report included the following information SBAR, Kardex, Intake/Output and MAR.

## 2017-10-05 NOTE — ED NOTES
TRANSFER - OUT REPORT:    Verbal report given to [address] [place] [address] (name) on Suzan Blanco  being transferred to [facility] (unit) for routine progression of care       Report consisted of patients Situation, Background, Assessment and   Recommendations(SBAR). Information from the following report(s) SBAR, ED Summary and MAR was reviewed with the receiving nurse. Lines:   Peripheral IV 10/04/17 Right Antecubital (Active)   Site Assessment Clean, dry, & intact 10/4/2017  8:01 PM   Phlebitis Assessment 0 10/4/2017  8:01 PM   Infiltration Assessment 0 10/4/2017  8:01 PM   Dressing Status Clean, dry, & intact 10/4/2017  8:01 PM   Dressing Type Transparent 10/4/2017  8:01 PM   Hub Color/Line Status Pink;Flushed 10/4/2017  8:01 PM   Action Taken Blood drawn 10/4/2017  8:01 PM        Opportunity for questions and clarification was provided.       Patient transported with:   Monitor

## 2017-10-05 NOTE — ROUTINE PROCESS
TRANSFER - OUT REPORT:    Verbal report given to Kasia Diaz RN on Rodney Fitch  being transferred to ICU for routine post - op       Report consisted of patients Situation, Background, Assessment and   Recommendations(SBAR). Information from the following report(s) SBAR and Procedure Summary was reviewed with the receiving nurse. Lines:   Triple Lumen central line 10/05/17 Right Femoral (Active)   Central Line Being Utilized Yes 10/5/2017  3:00 AM   Site Assessment Clean, dry, & intact 10/5/2017  3:00 AM   Infiltration Assessment 0 10/5/2017  3:00 AM   Dressing Status Clean, dry, & intact 10/5/2017  3:00 AM   Positive Blood Return (Medial Site) Yes 10/5/2017  3:00 AM   Positive Blood Return (Lateral Site) Yes 10/5/2017  3:00 AM   Positive Blood Return (Site #3) Yes 10/5/2017  3:00 AM       Peripheral IV 10/04/17 Right Antecubital (Active)   Site Assessment Clean, dry, & intact 10/4/2017  8:01 PM   Phlebitis Assessment 0 10/4/2017  8:01 PM   Infiltration Assessment 0 10/4/2017  8:01 PM   Dressing Status Clean, dry, & intact 10/4/2017  8:01 PM   Dressing Type Transparent 10/4/2017  8:01 PM   Hub Color/Line Status Pink;Flushed 10/4/2017  8:01 PM   Action Taken Blood drawn 10/4/2017  8:01 PM       Peripheral IV 10/04/17 Left Antecubital (Active)   Site Assessment Clean, dry, & intact 10/4/2017  7:30 PM   Phlebitis Assessment 0 10/4/2017  7:30 PM   Infiltration Assessment 0 10/4/2017  7:30 PM   Dressing Status Clean, dry, & intact 10/4/2017  7:30 PM   Dressing Type Transparent 10/4/2017  7:30 PM   Hub Color/Line Status Pink;Flushed;Patent 10/4/2017  7:30 PM        Opportunity for questions and clarification was provided.       Patient transported with:   O2 @ 6 liters  Registered Nurse

## 2017-10-05 NOTE — ED NOTES
Dr. Nettie Leon notified of patients blood pressure,patient is currently alert to voice and oriented, denies any CP or SOB.  No active bleeding noted at this time, no new orders placed at this time, will continue to monitor patient one to one

## 2017-10-05 NOTE — PROGRESS NOTES
attended the interdisciplinary rounds for Vernell Madrigal, who is a 74 y.o.,male. Patients Primary Language is: [place]. According to the patients EMR Faith Affiliation is: No Synagogue. The reason the Patient came to the hospital is:   Patient Active Problem List    Diagnosis Date Noted    GI bleed 10/04/2017    Symptomatic anemia 10/04/2017    Rectal bleed 10/04/2017    Hypertension     Diabetes ([facility]     Hyperlipidemia     Gout     Pacemaker     Vitamin D deficiency     CHF (congestive heart failure) (HCC)     BPH (benign prostatic hypertrophy)     Anemia     Atrial fibrillation ([facility]           Plan:  Chaplains will continue to follow and will provide pastoral care on an as needed/requested basis.  recommends bedside caregivers page  on duty if patient shows signs of acute spiritual or emotional distress.     [address]  Board Certified [address] [place] [address]   [phone]

## 2017-10-05 NOTE — PROGRESS NOTES
SUBJECTIVE:    Denies for chest and abdominal pain. No dizziness while in bed. No BM yet. NG tube draining dark colored GI contents. States he was discharged from [facility] on 10/3/17 and lost balance from Bathroom to bed yesterday. Feeling tired. Daughter is at bedside. OBJECTIVE:    /69  Pulse (!) 112  Temp 98.5 °F (36.9 °C)  Resp 17  Ht 5' 8\" (1.727 m)  Wt 99.8 kg (220 lb)  SpO2 100%  BMI 33.45 kg/m2    HEENT: pallor present. Oral mucosa is moist  Neck: no JVD  CVS: IRIR, PPM +  RS: CTA bilaterally  GI: NG tube present. BS +  Extremities: no pedal edema  CNS: moves all extremities well, motor strength 5/5 in all 4 extremities  General: NAD, Awake    ASSESSMENT:    1. Dizziness due to # 2  2. Acute GI blood loss anemia s/p 4 units  3. hematemesis due to gastritis  4. Gastritis and positive H Pylori  5. Chronic systolic Cardiomyopathy with AICD in place, EF 45%. Compensated. 6. History of Pulmonary HTN:  7. Chronic atrial fibrillation on coumadin  8. DM type 2 with nephropathy   9. Uremia due to UGIB  10. Hemorrhagic shock   11. High INR due to coumadin   12.  STRESS INDUCED LEUKOCYTOSIS     PLAN:    On Levo and PPI  Will transfuse him 2 more units - d/w nursing about it  Talked GI PA Dana Endo today and she will let know dr. Domi Robertson for H Pylori treatment  No ASA or coumadin  OBICI chart was reviewed  Agree with one unit of FFP    Total time greater than 35 minutes    CMP:   Lab Results   Component Value Date/Time     10/04/2017 02:35 PM    K 4.5 10/04/2017 02:35 PM     (H) 10/04/2017 02:35 PM    CO2 24 10/04/2017 02:35 PM    AGAP 8 10/04/2017 02:35 PM     (H) 10/04/2017 02:35 PM    BUN 86 (H) 10/04/2017 02:35 PM    CREA 1.56 (H) 10/04/2017 02:35 PM    GFRAA 53 (L) 10/04/2017 02:35 PM    GFRNA 44 (L) 10/04/2017 02:35 PM    CA 8.5 10/04/2017 02:35 PM    ALB 2.7 (L) 10/04/2017 02:35 PM    TP 5.5 (L) 10/04/2017 02:35 PM    GLOB 2.8 10/04/2017 02:35 PM    AGRAT 1.0 10/04/2017 02:35 PM    SGOT 13 (L) 10/04/2017 02:35 PM    ALT 18 10/04/2017 02:35 PM     CBC:   Lab Results   Component Value Date/Time    WBC 17.6 (H) 10/05/2017 10:30 AM    HGB 6.5 (L) 10/05/2017 10:30 AM    HCT 19.2 (L) 10/05/2017 10:30 AM     10/05/2017 10:30 AM     COAGS:   Lab Results   Component Value Date/Time    APTT 26.6 10/05/2017 10:30 AM    PTP 21.1 (H) 10/05/2017 10:30 AM    INR 1.9 (H) 10/05/2017 10:30 AM

## 2017-10-05 NOTE — ED NOTES
7:00 AM : Pt care transferred to Dr. Cira Henderson, ED provider. History of patient complaint(s), available diagnostic reports and current treatment plan has been discussed thoroughly. Bedside rounding on patient occured : Yes  Intended disposition of patient : TBD  Pending diagnostics reports and/or labs (please list):       4:29 AM  Performed a central line procedure on the right femoral vein. Flushed all 3 ports. Pt is well tolerated after the placement.

## 2017-10-05 NOTE — ED NOTES
TRANSFER - OUT REPORT:    Verbal report given to CLIVE Matos (name) on Valerie Moss  being transferred to ICU room 308 (unit) for routine progression of care       Report consisted of patients Situation, Background, Assessment and   Recommendations(SBAR). Information from the following report(s) SBAR, ED Summary, [facility] and Recent Results was reviewed with the receiving nurse. Lines:   Triple Lumen central line 10/05/17 Right Femoral (Active)   Central Line Being Utilized Yes 10/5/2017  3:00 AM   Site Assessment Clean, dry, & intact 10/5/2017  3:00 AM   Infiltration Assessment 0 10/5/2017  3:00 AM   Dressing Status Clean, dry, & intact 10/5/2017  3:00 AM   Positive Blood Return (Medial Site) Yes 10/5/2017  3:00 AM   Positive Blood Return (Lateral Site) Yes 10/5/2017  3:00 AM   Positive Blood Return (Site #3) Yes 10/5/2017  3:00 AM       Peripheral IV 10/04/17 Right Antecubital (Active)   Site Assessment Clean, dry, & intact 10/4/2017  8:01 PM   Phlebitis Assessment 0 10/4/2017  8:01 PM   Infiltration Assessment 0 10/4/2017  8:01 PM   Dressing Status Clean, dry, & intact 10/4/2017  8:01 PM   Dressing Type Transparent 10/4/2017  8:01 PM   Hub Color/Line Status Pink;Flushed 10/4/2017  8:01 PM   Action Taken Blood drawn 10/4/2017  8:01 PM       Peripheral IV 10/04/17 Left Antecubital (Active)   Site Assessment Clean, dry, & intact 10/4/2017  7:30 PM   Phlebitis Assessment 0 10/4/2017  7:30 PM   Infiltration Assessment 0 10/4/2017  7:30 PM   Dressing Status Clean, dry, & intact 10/4/2017  7:30 PM   Dressing Type Transparent 10/4/2017  7:30 PM   Hub Color/Line Status Pink;Flushed;Patent 10/4/2017  7:30 PM        Opportunity for questions and clarification was provided.       Patient transported with:   Monitor  Registered Nurse  Tech

## 2017-10-05 NOTE — PROCEDURES
[place]  [facility], [address]    Procedure Note    Patient: Sourav Sow MRN: [id number]  SSN: [id number]    YOB: 1945  Age: 74 y.o. Sex: male      Date/Time:  10/5/2017 2:23 PM    Esophagogastroduodenoscopy (EGD) Procedure Note    Procedure: Esophagogastroduodenoscopy --diagnostic    Impression:    -clean based shallow gastric ulcer unlikely to be the source of recurrent bleeding     Recommendations:  -Continue acid suppression. , -Stat bleeding scan for recurrent bleeding , -would prefer INR be corrected unless major contraindication, -transfuse for hct < 23%     Indication:  Gastrointestinal hemorrhage, unspecified  Pre-operative Diagnosis: see indication above  Post-operative Diagnosis: see findings below  :  Mars Spivey MD  Referring Provider:   Mei Yang MD    Exam:  Airway: clear, no airway problems anticipated  Heart: RRR, without gallops or rubs  Lungs: clear bilaterally without wheezes, crackles, or rhonchi  Abdomen: soft, nontender, nondistended, bowel sounds present  Mental Status: awake, alert and oriented to person, place and time     Anethesia/Sedation:  MAC anesthesia Propofol  Procedure Details   After informed consent was obtained for the procedure, with all risks and benefits of procedure explained the patient was taken to the endoscopy suite and placed in the left lateral decubitus position. Following sequential administration of sedation as per above, the [id number] gastroscope was inserted into the mouth and advanced under direct vision to third portion of the duodenum. A careful inspection was made as the gastroscope was withdrawn, including a retroflexed view of the proximal stomach; findings and interventions are described below.                   Findings: clean based gastric body ulcer at site of prior cautery, I doubt this recently bleed , could have another source     Therapies:  none  Specimens: none  Estimated blood loss:  None            Complications:   None; patient tolerated the procedure well. Discharge disposition:  To ICU  Yuly Workman MD

## 2017-10-05 NOTE — ANESTHESIA PREPROCEDURE EVALUATION
Anesthetic History   No history of anesthetic complications            Review of Systems / Medical History  Patient summary reviewed and pertinent labs reviewed    Pulmonary  Within defined limits                 Neuro/Psych   Within defined limits           Cardiovascular    Hypertension: poorly controlled        Dysrhythmias : atrial fibrillation  Pacemaker and CAD    Exercise tolerance: <4 METS     GI/Hepatic/Renal  Within defined limits              Endo/Other    Diabetes: well controlled, type 2         Other Findings   Comments:   Risk Factors for Postoperative nausea/vomiting:       History of postoperative nausea/vomiting? NO       Female? NO       Motion sickness? NO       Intended opioid administration for postoperative analgesia? NO      Smoking Abstinence  Current Smoker? NO  Elective Surgery? NO  Seen preoperatively by anesthesiologist or proxy prior to day of surgery? NO  Pt abstained from smoking 24 hours prior to anesthesia?  N/A           Physical Exam    Airway  Mallampati: II  TM Distance: 4 - 6 cm  Neck ROM: normal range of motion   Mouth opening: Normal     Cardiovascular  Regular rate and rhythm,  S1 and S2 normal,  no murmur, click, rub, or gallop             Dental    Dentition: Poor dentition     Pulmonary  Breath sounds clear to auscultation               Abdominal  GI exam deferred       Other Findings            Anesthetic Plan    ASA: 4  Anesthesia type: MAC          Induction: Intravenous  Anesthetic plan and risks discussed with: Patient

## 2017-10-05 NOTE — ADDENDUM NOTE
Addendum  created 10/05/17 1532 by Ravinder Diaz NP    Anesthesia Event deleted, Anesthesia Event edited, Anesthesia Intra Flowsheets edited, Procedure Event Log accessed, SmartForm saved

## 2017-10-05 NOTE — ED NOTES
Pt resting with family member at bedside. No acute distress noted. Vital signs stable. Will continue to monitor. No vomiting noted, patient is passing gas at this time but no bowel movement noted.

## 2017-10-06 LAB
ANION GAP SERPL CALC-SCNC: 10 MMOL/L (ref 3–18)
BASOPHILS # BLD: 0 K/UL (ref 0–0.1)
BASOPHILS NFR BLD: 0 % (ref 0–2)
BLD PROD TYP BPU: NORMAL
BPU ID: NORMAL
BUN SERPL-MCNC: 99 MG/DL (ref 7–18)
BUN/CREAT SERPL: 53 (ref 12–20)
CALCIUM SERPL-MCNC: 8.2 MG/DL (ref 8.5–10.1)
CALLED TO:,BCALL1: NORMAL
CHLORIDE SERPL-SCNC: 120 MMOL/L (ref 100–108)
CO2 SERPL-SCNC: 21 MMOL/L (ref 21–32)
CREAT SERPL-MCNC: 1.88 MG/DL (ref 0.6–1.3)
DIFFERENTIAL METHOD BLD: ABNORMAL
DIGOXIN SERPL-MCNC: 0.6 NG/ML (ref 0.9–2)
EOSINOPHIL # BLD: 0 K/UL (ref 0–0.4)
EOSINOPHIL NFR BLD: 0 % (ref 0–5)
ERYTHROCYTE [DISTWIDTH] IN BLOOD BY AUTOMATED COUNT: 17 % (ref 11.6–14.5)
GLUCOSE BLD STRIP.AUTO-MCNC: 172 MG/DL (ref 70–110)
GLUCOSE BLD STRIP.AUTO-MCNC: 172 MG/DL (ref 70–110)
GLUCOSE BLD STRIP.AUTO-MCNC: 190 MG/DL (ref 70–110)
GLUCOSE SERPL-MCNC: 185 MG/DL (ref 74–99)
HCT VFR BLD AUTO: 20.4 % (ref 36–48)
HCT VFR BLD AUTO: 20.4 % (ref 36–48)
HCT VFR BLD AUTO: 20.8 % (ref 36–48)
HCT VFR BLD AUTO: 31.2 % (ref 36–48)
HGB BLD-MCNC: 10.5 G/DL (ref 13–16)
HGB BLD-MCNC: 7 G/DL (ref 13–16)
HGB BLD-MCNC: 7.1 G/DL (ref 13–16)
HGB BLD-MCNC: 7.1 G/DL (ref 13–16)
LYMPHOCYTES # BLD: 1.9 K/UL (ref 0.9–3.6)
LYMPHOCYTES NFR BLD: 16 % (ref 21–52)
MCH RBC QN AUTO: 27.9 PG (ref 24–34)
MCHC RBC AUTO-ENTMCNC: 34.3 G/DL (ref 31–37)
MCV RBC AUTO: 81.3 FL (ref 74–97)
MONOCYTES # BLD: 1.3 K/UL (ref 0.05–1.2)
MONOCYTES NFR BLD: 11 % (ref 3–10)
NEUTS SEG # BLD: 8.8 K/UL (ref 1.8–8)
NEUTS SEG NFR BLD: 73 % (ref 40–73)
PLATELET # BLD AUTO: 104 K/UL (ref 135–420)
PMV BLD AUTO: 11.5 FL (ref 9.2–11.8)
POTASSIUM SERPL-SCNC: 4.2 MMOL/L (ref 3.5–5.5)
RBC # BLD AUTO: 2.51 M/UL (ref 4.7–5.5)
SODIUM SERPL-SCNC: 151 MMOL/L (ref 136–145)
STATUS OF UNIT,%ST: NORMAL
UNIT DIVISION, %UDIV: 0
WBC # BLD AUTO: 12.1 K/UL (ref 4.6–13.2)

## 2017-10-06 PROCEDURE — 74011000258 HC RX REV CODE- 258: Performed by: PHYSICIAN ASSISTANT

## 2017-10-06 PROCEDURE — 85018 HEMOGLOBIN: CPT

## 2017-10-06 PROCEDURE — 74011250636 HC RX REV CODE- 250/636: Performed by: INTERNAL MEDICINE

## 2017-10-06 PROCEDURE — 36430 TRANSFUSION BLD/BLD COMPNT: CPT

## 2017-10-06 PROCEDURE — 80162 ASSAY OF DIGOXIN TOTAL: CPT | Performed by: HOSPITALIST

## 2017-10-06 PROCEDURE — 74011000250 HC RX REV CODE- 250: Performed by: INTERNAL MEDICINE

## 2017-10-06 PROCEDURE — 74011636637 HC RX REV CODE- 636/637: Performed by: INTERNAL MEDICINE

## 2017-10-06 PROCEDURE — C9113 INJ PANTOPRAZOLE SODIUM, VIA: HCPCS | Performed by: INTERNAL MEDICINE

## 2017-10-06 PROCEDURE — 85025 COMPLETE CBC W/AUTO DIFF WBC: CPT | Performed by: INTERNAL MEDICINE

## 2017-10-06 PROCEDURE — 80048 BASIC METABOLIC PNL TOTAL CA: CPT | Performed by: INTERNAL MEDICINE

## 2017-10-06 PROCEDURE — P9016 RBC LEUKOCYTES REDUCED: HCPCS | Performed by: EMERGENCY MEDICINE

## 2017-10-06 PROCEDURE — 36592 COLLECT BLOOD FROM PICC: CPT

## 2017-10-06 PROCEDURE — P9059 PLASMA, FRZ BETWEEN 8-24HOUR: HCPCS | Performed by: INTERNAL MEDICINE

## 2017-10-06 PROCEDURE — 65610000006 HC RM INTENSIVE CARE

## 2017-10-06 PROCEDURE — 74011000258 HC RX REV CODE- 258: Performed by: INTERNAL MEDICINE

## 2017-10-06 PROCEDURE — 82962 GLUCOSE BLOOD TEST: CPT

## 2017-10-06 RX ORDER — SODIUM CHLORIDE 9 MG/ML
500 INJECTION, SOLUTION INTRAVENOUS CONTINUOUS
Status: DISCONTINUED | OUTPATIENT
Start: 2017-10-06 | End: 2017-10-08

## 2017-10-06 RX ORDER — AMOXICILLIN 250 MG/1
1000 CAPSULE ORAL EVERY 12 HOURS
Status: DISCONTINUED | OUTPATIENT
Start: 2017-10-06 | End: 2017-10-10 | Stop reason: HOSPADM

## 2017-10-06 RX ORDER — CLARITHROMYCIN 500 MG/1
500 TABLET, FILM COATED ORAL 2 TIMES DAILY
Status: DISCONTINUED | OUTPATIENT
Start: 2017-10-07 | End: 2017-10-10 | Stop reason: HOSPADM

## 2017-10-06 RX ORDER — SODIUM CHLORIDE 9 MG/ML
250 INJECTION, SOLUTION INTRAVENOUS AS NEEDED
Status: DISCONTINUED | OUTPATIENT
Start: 2017-10-06 | End: 2017-10-10 | Stop reason: HOSPADM

## 2017-10-06 RX ORDER — SODIUM CHLORIDE 450 MG/100ML
100 INJECTION, SOLUTION INTRAVENOUS CONTINUOUS
Status: DISCONTINUED | OUTPATIENT
Start: 2017-10-06 | End: 2017-10-09

## 2017-10-06 RX ADMIN — SODIUM CHLORIDE 50 ML/HR: 450 INJECTION, SOLUTION INTRAVENOUS at 10:02

## 2017-10-06 RX ADMIN — INSULIN LISPRO 3 UNITS: 100 INJECTION, SOLUTION INTRAVENOUS; SUBCUTANEOUS at 18:00

## 2017-10-06 RX ADMIN — SODIUM CHLORIDE 500 ML/HR: 900 INJECTION, SOLUTION INTRAVENOUS at 23:00

## 2017-10-06 RX ADMIN — INSULIN LISPRO 3 UNITS: 100 INJECTION, SOLUTION INTRAVENOUS; SUBCUTANEOUS at 12:00

## 2017-10-06 RX ADMIN — INSULIN LISPRO 3 UNITS: 100 INJECTION, SOLUTION INTRAVENOUS; SUBCUTANEOUS at 05:36

## 2017-10-06 RX ADMIN — SODIUM CHLORIDE 8 MG/HR: 900 INJECTION, SOLUTION INTRAVENOUS at 07:38

## 2017-10-06 RX ADMIN — SODIUM CHLORIDE 40 MG: 9 INJECTION INTRAMUSCULAR; INTRAVENOUS; SUBCUTANEOUS at 22:33

## 2017-10-06 NOTE — PROGRESS NOTES
[facility]  [phone]    GI Attending note:   Continued low hct in spite of multiple trasfusions . No gross bleeding , Some blood when rear end wiped by nursing . It was not clear that previous  was the most recent site of bleeding due to its appearance. INR not corrected . Again would recommend correcting INR and ordering stat bleed scan to confirm he is not bleeding. Maintain hct around 23%.        Impression  1. Gi bleed- 10/5/17 EGD clean based gastric body ulcer at site of prior cautery  2. Blood loss anemia  3. Cardiomyopathy- AICD   4. Pulm HTN   5. Afib  6. DM  7. Renal dysfuncyion    Plan:  1. Continue PPI  2. Monitor H/H transfuse hgb<23%  3. Continue monitor for overt GI hemorrhage  4. Advance diet   5. Ok to switch to bid ppi      Chief Complaint: GI bleed    Subjective:  Pt uneventful overnight.  Pt denies melena, hematochezia, hematemesis        Eyes: conjunctiva normal, EOM normal   ENT: Mucous membranes moist, no lesion or thrush   Cardiovascular:  normal rate and regular rhythm, no murmur   Pulmonary/Chest Wall: breath sounds normal and effort normal   Abdominal:  soft, non-acute, non-tender, no appreciable mass or hepatosplenomegaly, no appreciable ascites       Visit Vitals    /51    Pulse (!) 107    Temp 97.9 °F (36.6 °C)    Resp 14    Ht 5' 8\" (1.727 m)    Wt 96.7 kg (213 lb 3 oz)    SpO2 90%    BMI 32.41 kg/m2           Intake/Output Summary (Last 24 hours) at 10/06/17 1118  Last data filed at 10/06/17 0456   Gross per 24 hour   Intake              420 ml   Output             1750 ml   Net            -1330 ml       CBC w/Diff    Lab Results   Component Value Date/Time    WBC 12.1 10/06/2017 04:40 AM    RBC 2.51 (L) 10/06/2017 04:40 AM    HGB 7.1 (L) 10/06/2017 10:00 AM    HCT 20.8 (L) 10/06/2017 10:00 AM    MCV 81.3 10/06/2017 04:40 AM    MCH 27.9 10/06/2017 04:40 AM    MCHC 34.3 10/06/2017 04:40 AM    RDW 17.0 (H) 10/06/2017 04:40 AM     (L) 10/06/2017 04:40 AM    Lab Results   Component Value Date/Time    GRANS 73 10/06/2017 04:40 AM    LYMPH 16 (L) 10/06/2017 04:40 AM    EOS 0 10/06/2017 04:40 AM    BASOS 0 10/06/2017 04:40 AM      Basic Metabolic Profile   Recent Labs      10/06/17   0440   NA  151*   K  4.2   CL  120*   CO2  21   BUN  99*   CA  8.2*        Hepatic Function    Lab Results   Component Value Date/Time    ALB 2.5 (L) 10/05/2017 11:32 AM    TP 4.6 (L) 10/05/2017 11:32 AM    AP 34 (L) 10/05/2017 11:32 AM    Lab Results   Component Value Date/Time    SGOT 10 (L) 10/05/2017 11:32 AM          Julia Palomares NP  [facility], [facility]  [email or web address]

## 2017-10-06 NOTE — DIABETES MGMT
GLYCEMIC CONTROL PLAN OF CARE    Assessment/Recommendations:  Pt discussed in interdisciplinary rounds.  Pt remains NPO  Glimepiride discontinued  Continue correctional Lispro insulin coverage  Continue inpatient monitoring and intervention     Most recent blood glucose values:  10/5/2017 16:28 10/5/2017 23:44 10/6/2017 05:33 10/6/2017 11:50   209 (H) 160 (H) 172 (H) 190 (H)     Current A1C of <3.5%     Current hospital diabetes medications:   Correctional Lispro insulin every 6 hours   Glimepiride 4 mg daily     Previous day's insulin requirements:   18 units of Lispro insulin     Home diabetes medications:  Metformin 1,000 mg two times daily   Glimepiride 4 mg every morning      Diet:  NPO     Education:  ____Refer to Diabetes Education Record             __x__Education not indicated at this time    Anderson Quinonez RD, CDE

## 2017-10-06 NOTE — PROGRESS NOTES
attended the interdisciplinary rounds for Lillie Weinstein, who is a 74 y.o.,male. Patients Primary Language is: [place]. According to the patients EMR Congregation Affiliation is: No Jewish. The reason the Patient came to the hospital is:   Patient Active Problem List    Diagnosis Date Noted    Acute gastric ulcer with hemorrhage 10/05/2017    GI bleed 10/04/2017    Symptomatic anemia 10/04/2017    Rectal bleed 10/04/2017    Hypertension     Diabetes ([facility] [address]     Hyperlipidemia     Gout     Pacemaker     Vitamin D deficiency     CHF (congestive heart failure) (HCC)     BPH (benign prostatic hypertrophy)     Anemia     Atrial fibrillation ([facility] [address] [facility]         Plan:  Chaplains will continue to follow and will provide pastoral care on an as needed/requested basis.  recommends bedside caregivers page  on duty if patient shows signs of acute spiritual or emotional distress.     [address] [facility]  Board Certified [address] [facility]   [phone]

## 2017-10-06 NOTE — PROGRESS NOTES
NUTRITION    Nutrition Screen      RECOMMENDATIONS / PLAN:     - Evaluate ability to tolerate po and start diet as medically appropriate.   - Continue RD inpatient monitoring and evaluation. NUTRITION INTERVENTIONS & DIAGNOSIS:     [] Meals/Snacks: modified diet, NPO  [x] Coordination of care: interdisciplinary rounds     Nutrition Diagnosis: Inadequate oral intake related to inability to tolerate po with altered GI function as evidenced by pt NPO with GI bleed  . ASSESSMENT:     10/6: NGT discontinued yesterday after EDG. GI bleed improving per RN. Pt remain NPO. GI to evaluate ability to tolerate po and start diet. 10/5: Pt NPO with GI bleed and plan for endoscopy today. Average po intake adequate to meet patients estimated nutritional needs:   [] Yes     [x] No   [] Unable to determine at this time    Diet: DIET NPO      Food Allergies: NKFA  Current Appetite:   [] Good     [] Fair     [] Poor     [x] Other: NPO  Appetite/meal intake prior to admission:   [] Good     [] Fair     [] Poor     [x] Other: unknown   Feeding Limitations:  [] Swallowing difficulty    [] Chewing difficulty    [] Other:  Current Meal Intake: No data found.     BM: 10/6; foul smell burgandy stool per chart documentation; small traces of blood currently   Skin Integrity: WDL  Edema: none   Pertinent Medications: Reviewed: calcium gluconate, colace, lasix, SSI, levophed, Zofran, protonix    Recent Labs      10/06/17   0440  10/05/17   1132  10/04/17   1435   NA  151*  147*  142   K  4.2  4.6  4.5   CL  120*  117*  110*   CO2  21  19*  24   GLU  185*  279*  212*   BUN  99*  107*  86*   CREA  1.88*  1.92*  1.56*   CA  8.2*  8.0*  8.5   ALB   --   2.5*  2.7*   SGOT   --   10*  13*   ALT   --   14*  18       Intake/Output Summary (Last 24 hours) at 10/06/17 1212  Last data filed at 10/06/17 0456   Gross per 24 hour   Intake              385 ml   Output             1550 ml   Net            -1165 ml       Anthropometrics:  Ht Readings from Last 1 Encounters:   10/05/17 5' 8\" (1.727 m)     Last 3 Recorded Weights in this Encounter    10/05/17 0211 10/05/17 0830 10/06/17 0537   Weight: 99.8 kg (220 lb) 98.3 kg (216 lb 11.4 oz) 96.7 kg (213 lb 3 oz)     Body mass index is 32.41 kg/(m^2). Obese, Class I     Weight History:   Weight Metrics 10/6/2017 4/25/2017 4/3/2017 4/22/2016 10/21/2015 4/30/2015 2/19/2015   Weight 213 lb 3 oz 228 lb 228 lb 244 lb 244 lb 252 lb 252 lb   BMI 32.41 kg/m2 34.16 kg/m2 34.16 kg/m2 37.11 kg/m2 37.11 kg/m2 38.33 kg/m2 38.33 kg/m2        Admitting Diagnosis: Symptomatic anemia  GI bleed  UPPER GI BLEED  Rectal bleed  Symptomatic anemia  Pertinent PMHx: CAD, CHF, DM, HTN, HLD    Education Needs:        [x] None identified  [] Identified - Not appropriate at this time  []  Identified and addressed - refer to education log  Learning Limitations:   [x] None identified  [] Identified    Cultural, Mormonism & ethnic food preferences:  [x] None identified    [] Identified and addressed     ESTIMATED NUTRITION NEEDS:     Calories: 6733-9122 kcal (MSJx1.2-1.3) based on  [x] Actual  kg     [] IBW   Protein:  gm (0.8-1 gm/kg) based on  [x] Actual BW      [] IBW   Fluid: 1 mL/kcal     MONITORING & EVALUATION:     Nutrition Goal(s):   1. Po intake of meals will meet >75% of patient estimated nutritional needs within the next 7 days.   Outcome:  [] Met/Ongoing    [x]  Not Met    [] New/Initial Goal     Monitoring:   [x] Diet tolerance   [x] Meal intake   [] Supplement intake   [] GI symptoms/ability to tolerate po diet   [] Respiratory status   [] Plan of care      Previous Recommendations (for follow-up assessments only):     []   Implemented       [x]   Not Implemented (RD to address)     [] No Recommendation Made     Discharge Planning: cardiac, diabetic diet as tolerated   [x] Participated in care planning, discharge planning, & interdisciplinary rounds as appropriate      Olga Hernandez, [address] [place] [address]  Pager: [phone]

## 2017-10-06 NOTE — PROGRESS NOTES
Spoke with Estrada Thomas as requested by . Dr. Michelle Diaz has advised that patient's INR be corrected and bleeding scan be done stat. Recommended 2 units of PRBCs @ this time.  Will notify ICU team.

## 2017-10-06 NOTE — PROGRESS NOTES
Orthostatic Blood Pressures: Lying 112/51, Sitting 122/61, Standing 116/71- Pt denies any dizziness or discomforts during assessment.

## 2017-10-07 ENCOUNTER — APPOINTMENT (OUTPATIENT)
Dept: NUCLEAR MEDICINE | Age: 72
DRG: 377 | End: 2017-10-07
Attending: INTERNAL MEDICINE
Payer: MEDICARE

## 2017-10-07 LAB
BLD PROD TYP BPU: NORMAL
BPU ID: NORMAL
CALLED TO:,BCALL1: NORMAL
GLUCOSE BLD STRIP.AUTO-MCNC: 131 MG/DL (ref 70–110)
GLUCOSE BLD STRIP.AUTO-MCNC: 131 MG/DL (ref 70–110)
GLUCOSE BLD STRIP.AUTO-MCNC: 134 MG/DL (ref 70–110)
GLUCOSE BLD STRIP.AUTO-MCNC: 153 MG/DL (ref 70–110)
GLUCOSE BLD STRIP.AUTO-MCNC: 202 MG/DL (ref 70–110)
HCT VFR BLD AUTO: 19.1 % (ref 36–48)
HCT VFR BLD AUTO: 23.6 % (ref 36–48)
HCT VFR BLD AUTO: 25.5 % (ref 36–48)
HGB BLD-MCNC: 6.5 G/DL (ref 13–16)
HGB BLD-MCNC: 7.8 G/DL (ref 13–16)
HGB BLD-MCNC: 8.6 G/DL (ref 13–16)
INR PPP: 1.6 (ref 0.8–1.2)
PROTHROMBIN TIME: 18.4 SEC (ref 11.5–15.2)
STATUS OF UNIT,%ST: NORMAL
UNIT DIVISION, %UDIV: 0

## 2017-10-07 PROCEDURE — 36592 COLLECT BLOOD FROM PICC: CPT

## 2017-10-07 PROCEDURE — 74011000258 HC RX REV CODE- 258: Performed by: INTERNAL MEDICINE

## 2017-10-07 PROCEDURE — 65610000006 HC RM INTENSIVE CARE

## 2017-10-07 PROCEDURE — 74011636637 HC RX REV CODE- 636/637: Performed by: INTERNAL MEDICINE

## 2017-10-07 PROCEDURE — 74011250637 HC RX REV CODE- 250/637: Performed by: HOSPITALIST

## 2017-10-07 PROCEDURE — 85610 PROTHROMBIN TIME: CPT | Performed by: INTERNAL MEDICINE

## 2017-10-07 PROCEDURE — 74011000250 HC RX REV CODE- 250: Performed by: INTERNAL MEDICINE

## 2017-10-07 PROCEDURE — 74011250637 HC RX REV CODE- 250/637: Performed by: INTERNAL MEDICINE

## 2017-10-07 PROCEDURE — 74011250636 HC RX REV CODE- 250/636: Performed by: INTERNAL MEDICINE

## 2017-10-07 PROCEDURE — 74011250636 HC RX REV CODE- 250/636: Performed by: HOSPITALIST

## 2017-10-07 PROCEDURE — A9560 TC99M LABELED RBC: HCPCS

## 2017-10-07 PROCEDURE — 82962 GLUCOSE BLOOD TEST: CPT

## 2017-10-07 PROCEDURE — P9016 RBC LEUKOCYTES REDUCED: HCPCS | Performed by: EMERGENCY MEDICINE

## 2017-10-07 PROCEDURE — 85018 HEMOGLOBIN: CPT | Performed by: INTERNAL MEDICINE

## 2017-10-07 PROCEDURE — C9113 INJ PANTOPRAZOLE SODIUM, VIA: HCPCS | Performed by: INTERNAL MEDICINE

## 2017-10-07 PROCEDURE — 36430 TRANSFUSION BLD/BLD COMPNT: CPT

## 2017-10-07 RX ORDER — HEPARIN SODIUM 1000 [USP'U]/ML
5000 INJECTION, SOLUTION INTRAVENOUS; SUBCUTANEOUS
Status: COMPLETED | OUTPATIENT
Start: 2017-10-07 | End: 2017-10-07

## 2017-10-07 RX ORDER — SODIUM CHLORIDE 9 MG/ML
250 INJECTION, SOLUTION INTRAVENOUS AS NEEDED
Status: DISCONTINUED | OUTPATIENT
Start: 2017-10-07 | End: 2017-10-10 | Stop reason: HOSPADM

## 2017-10-07 RX ADMIN — ALLOPURINOL 300 MG: 300 TABLET ORAL at 09:45

## 2017-10-07 RX ADMIN — FINASTERIDE 5 MG: 5 TABLET, FILM COATED ORAL at 09:45

## 2017-10-07 RX ADMIN — AMOXICILLIN 1000 MG: 250 CAPSULE ORAL at 09:45

## 2017-10-07 RX ADMIN — CLARITHROMYCIN 500 MG: 500 TABLET ORAL at 09:52

## 2017-10-07 RX ADMIN — CLARITHROMYCIN 500 MG: 500 TABLET ORAL at 17:49

## 2017-10-07 RX ADMIN — INSULIN LISPRO 6 UNITS: 100 INJECTION, SOLUTION INTRAVENOUS; SUBCUTANEOUS at 17:48

## 2017-10-07 RX ADMIN — SODIUM CHLORIDE 100 ML/HR: 450 INJECTION, SOLUTION INTRAVENOUS at 14:34

## 2017-10-07 RX ADMIN — HEPARIN SODIUM 1000 UNITS: 1000 INJECTION, SOLUTION INTRAVENOUS; SUBCUTANEOUS at 10:00

## 2017-10-07 RX ADMIN — SODIUM CHLORIDE 40 MG: 9 INJECTION INTRAMUSCULAR; INTRAVENOUS; SUBCUTANEOUS at 21:38

## 2017-10-07 RX ADMIN — SODIUM CHLORIDE 40 MG: 9 INJECTION INTRAMUSCULAR; INTRAVENOUS; SUBCUTANEOUS at 09:46

## 2017-10-07 RX ADMIN — AMOXICILLIN 1000 MG: 250 CAPSULE ORAL at 00:43

## 2017-10-07 RX ADMIN — EZETIMIBE 10 MG: 10 TABLET ORAL at 09:45

## 2017-10-07 RX ADMIN — AMOXICILLIN 1000 MG: 250 CAPSULE ORAL at 21:38

## 2017-10-07 NOTE — PROGRESS NOTES
Telephoned  regarding most recent H/H 10.5/31.2 after 1 unit of PRBC today. [facility] has requested H/H be repeated following completion of FFPs. Bleeding scan and additional PRBC transfusion should only be done if HCT is less than 25 or patient is clinically bleeding, per MD. Oncoming nurse is aware and will cancel bleeding scan if warranted.

## 2017-10-07 NOTE — PROGRESS NOTES
SUBJECTIVE:    Has no complaints. Is happy to be able to take PO liquids. OBJECTIVE:    /63  Pulse (!) 3  Temp 99.5 °F (37.5 °C)  Resp 15  Ht 5' 8\" (1.727 m)  Wt 96.7 kg (213 lb 3 oz)  SpO2 100%  BMI 32.41 kg/m2      Neck: no JVD  CVS: IRIR, PPM +  RS: CTA bilaterally  GI: NG tube present. BS +  Extremities: no pedal edema    General: NAD, Awake    ASSESSMENT:    1. Dizziness due to # 2  2. Acute GI blood loss anemia s/p 4 units  3. hematemesis due to gastritis  4. Gastric ulcer from endoscopy done at outside facility on 10/03, bx positive for H Pylori  5. Chronic systolic Cardiomyopathy with AICD in place, EF 45%. Compensated. 6. History of Pulmonary HTN:  7. Chronic atrial fibrillation was on coumadin  8. DM type 2 with nephropathy   9. Uremia due to UGIB  10. Hemorrhagic shock   11. High INR due to coumadin, s/p FFP today  12. STRESS INDUCED LEUKOCYTOSIS . 13. Hypernatremia    PLAN:    On Levo and PPI  Will cont to monitor h+h and transfuse PRN. Will start on H.pylori treatment with clarithromycin based therapy   No ASA or coumadin  OBICI chart was reviewed  Check INR in am  Will give D5w and follow sodium.     Total time greater than 35 minutes    CMP:   Lab Results   Component Value Date/Time     (H) 10/06/2017 04:40 AM    K 4.2 10/06/2017 04:40 AM     (H) 10/06/2017 04:40 AM    CO2 21 10/06/2017 04:40 AM    AGAP 10 10/06/2017 04:40 AM     (H) 10/06/2017 04:40 AM    BUN 99 (H) 10/06/2017 04:40 AM    CREA 1.88 (H) 10/06/2017 04:40 AM    GFRAA 43 (L) 10/06/2017 04:40 AM    GFRNA 35 (L) 10/06/2017 04:40 AM    CA 8.2 (L) 10/06/2017 04:40 AM     CBC:   Lab Results   Component Value Date/Time    WBC 12.1 10/06/2017 04:40 AM    HGB 10.5 (L) 10/06/2017 05:30 PM    HCT 31.2 (L) 10/06/2017 05:30 PM     (L) 10/06/2017 04:40 AM     COAGS:   No results found for: APTT, PTP, INR

## 2017-10-07 NOTE — ROUTINE PROCESS
Received pt in bed watching tV. Voiced no complaints. Call bell within reach. Bed low and locked. Will continue to monitor    2254 Clarified order of 500cc fluid bolus of normal saline and  maintance IV of Normal saline . 45 at 100cc/hr. Pt's sodium 154. Dr Shirin Díaz states pt dehydated and needed fluid. Bedside and Verbal shift change report given to [address] (oncoming nurse) by Rupert Catalan RN   (offgoing nurse). Report included the following information SBAR, Kardex, Intake/Output and MAR.

## 2017-10-07 NOTE — PROGRESS NOTES
[facility]. Pulmonary, Critical Care, and Sleep Medicine    Name: Lillie Weinstein MRN: [id number]   : 1945 Hospital: [facility]   Date: 10/7/2017  Admission Date: 10/4/2017     Chart and notes reviewed. Data reviewed. I have evaluated all findings. I did not see patient due to his being off the floor when I went to his room. [x]I have reviewed the flowsheet and previous days notes. []The patient is unable to give any meaningful history or review of systems because the patient is:  []Intubated []Sedated   []Unresponsive      [x]The patient is critically ill on      []Mechanical ventilation []Pressors   []BiPAP [x]GI bleeding               Events and notes from last 24 hours reviewed. Care plan discussed with RN staff. Labs ordered. Patient Active Problem List   Diagnosis Code    Hypertension I10    Diabetes ([facility] [address] E11.9    Hyperlipidemia E78.5    Gout M10.9    Pacemaker Z95.0    Vitamin D deficiency E55.9    CHF (congestive heart failure) ([facility]) I50.9    BPH (benign prostatic hypertrophy) N40.0    Anemia D64.9    Atrial fibrillation (HCC) I48.91    GI bleed K92.2    Symptomatic anemia D64.9    Rectal bleed K62.5    Acute gastric ulcer with hemorrhage K25.0       Vital Signs:  Visit Vitals    /64    Pulse 75    Temp 98.6 °F (37 °C)    Resp 19    Ht 5' 8\" (1.727 m)    Wt 100.1 kg (220 lb 10.9 oz)    SpO2 100%    BMI 33.55 kg/m2       O2 Device: Room air       Temp (24hrs), Av.6 °F (37 °C), Min:98 °F (36.7 °C), Max:99.5 °F (37.5 °C)       Intake/Output:   Last shift:         Last 3 shifts: 10/06 0701 - 10/07 190  In: 3686.7 [P.O.:1280;  I.V.:1786.7]  Out: 3000 [Urine:3000]    Intake/Output Summary (Last 24 hours) at 10/07/17 1923  Last data filed at 10/07/17 1432   Gross per 24 hour   Intake          2426.67 ml   Output             1375 ml   Net          1051.67 ml      Ventilator Settings: n/a                Current Facility-Administered Medications   Medication Dose Route Frequency    0.45% sodium chloride infusion  100 mL/hr IntraVENous CONTINUOUS    pantoprazole (PROTONIX) 40 mg in sodium chloride 0.9 % 10 mL injection  40 mg IntraVENous Q12H    clarithromycin (BIAXIN) tablet 500 mg  500 mg Oral BID    amoxicillin (AMOXIL) capsule 1,000 mg  1,000 mg Oral Q12H    0.9% sodium chloride infusion  500 mL/hr IntraVENous CONTINUOUS    NOREPINephrine (LEVOPHED) 8,000 mcg in dextrose 5% 250 mL infusion  2-16 mcg/min IntraVENous TITRATE    insulin lispro (HUMALOG) injection   SubCUTAneous Q6H    allopurinol (ZYLOPRIM) tablet 300 mg  300 mg Oral DAILY    ezetimibe (ZETIA) tablet 10 mg  10 mg Oral DAILY    finasteride (PROSCAR) tablet 5 mg  5 mg Oral DAILY    docusate sodium (COLACE) capsule 100 mg  100 mg Oral BID       Telemetry:     Physical Exam:   General: in no apparent distress   HEENT: Normal, no orthostatic BP changes today   Neck: No abnormally enlarged lymph nodes. Chest: normal   Lungs: normal air entry, no dullness/ tenderness/ rash   Heart: Regular rate and rhythm   Abdomen: non distended, bowel sounds normoactive, tympanic, abdomen is soft without significant tenderness, masses, organomegaly or guarding   Extremity: negative   Neuro: alert   Skin: Skin color, texture, turgor normal. No rashes or lesions   DATA:  MAR reviewed and pertinent medications noted or modified as needed    Labs: 1 U PRBCs today with normal increment. Recent Labs      10/07/17   1430  10/07/17   0317  10/06/17   1730   10/06/17   0440   10/05/17   1900  10/05/17   1030   WBC   --    --    --    --   12.1   --   14.0*  17.6*   HGB  8.6*  6.5*  10.5*   < >  7.0*   < >  7.4*  6.5*   HCT  25.5*  19.1*  31.2*   < >  20.4*   < >  21.4*  19.2*   PLT   --    --    --    --   104*   --   113*  148    < > = values in this interval not displayed.      Recent Labs      10/07/17   0317  10/06/17   0440  10/05/17   1132  10/05/17   1030   NA   --   151*  147*   --    K --   4.2  4.6   --    CL   --   120*  117*   --    CO2   --   21  19*   --    GLU   --   185*  279*   --    BUN   --   99*  107*   --    CREA   --   1.88*  1.92*   --    CA   --   8.2*  8.0*   --    ALB   --    --   2.5*   --    SGOT   --    --   10*   --    ALT   --    --   14*   --    INR  1.6*   --    --   1.9*     No results for input(s): PH, PCO2, PO2, HCO3, FIO2 in the last 72 hours. No results for input(s): FIO2I, IFO2, HCO3I, IHCO3, HCOPOC, PCO2I, PCOPOC, IPHI, PHI, PHPOC, PO2I, PO2POC in the last 72 hours. No lab exists for component: IPOC2  Imaging:  [x]                           I have personally reviewed the patients radiographs and reports    High complexity decision making was performed during this consultation and evaluation. [x]       Pt is at high risk for further organ failure and dysfunction. Critical care time spent 0 minutes with patient exclusive of procedures.     IMPRESSION:   · Acute GI bleeding, mgmt per GI  · Acute renal failure on CKD, improving  · Anemia of blood loss, no longer dizzy or orthostatic  · Coagulopathy of blood loss  · Diverticulosis  · No active pulmonary issues  · Hyperglycemia, hypernatremia  · AICD  · Pulmonary hypertension  · Cardiomyopathy, afib on chronic coumadin  · DM with recent HbA1c at goal  · Other problems per PMH list; inactive      PLAN:   Follow h/h daily, maintain Hb >7, PPIs  Appreciate GI input  Re-check labs and CXR in AM  Re-start anticoagulation for chronic afib when GI approves; monitor levels closely         Pedro Maddox MD   10/7/2017 10:23 PM

## 2017-10-07 NOTE — ROUTINE PROCESS
Bedside, Verbal and Written shift change report given to Ms. Masood Arnold RN (oncoming nurse) by Ms. Harpal DUFFY (offgoing nurse). Report included the following information Kardex, Procedure Summary, Intake/Output, MAR and Recent Results.

## 2017-10-07 NOTE — PROGRESS NOTES
[place]  [facility], [address]    Progress Note    Patient: Alyssa Mitchell MRN: [id number]  SSN: [id number]    YOB: 1945  Age: 74 y.o. Sex: male      Admit Date: 10/4/2017    LOS: 3 days     Chief complaint:bleeding    Impression:     1. Continues to drop hct without any bowel movements, has old dark blood on rectal.  Bleed scan ordered yesterday not done for unclear reasons. 2. Clear based small shallow gastric ulcer that did not look like it had recently bled, ? Another source for bleeding   3. Anticoagulation now reversed     Plan:     1. Reorder bleed scan   2. May need repeat endoscopy risks benfits and alt discussed with pt   Transfuse after bleed to scan to hct around 23% and monitor hct every 6 hours   4. PPI drip    Subjective: The patient problems have continued with low blood count this am in spite of continued transfusion. Some confusion regarding spuriously high blood count last night and bleed scan not done. No n/v, abd pain , or bms in days. Objective:     Vitals:    10/07/17 0400 10/07/17 0500 10/07/17 0600 10/07/17 0822   BP: 119/57 135/70 136/77    Pulse: 74 83 76    Resp: 14 18 14    Temp: 98.5 °F (36.9 °C)   98 °F (36.7 °C)   SpO2: 100% 100% 100%    Weight: 100.1 kg (220 lb 10.9 oz)      Height:            Physical Exam:   GENERAL: alert, cooperative, no distress, appears stated age  ABDOMEN: soft, non-tender.  Bowel sounds normal. No masses,  no organomegaly, scant material in rectal vault dark soft stool no red blood     Lab/Data Review:  BMP: No results found for: NA, K, CL, CO2, AGAP, GLU, BUN, CREA, GFRAA, GFRNA  CMP: No results found for: NA, K, CL, CO2, AGAP, GLU, BUN, CREA, GFRAA, GFRNA, CA, MG, PHOS, ALB, TBIL, TP, ALB, GLOB, AGRAT, SGOT, ALT, GPT  CBC:   Lab Results   Component Value Date/Time    HGB 6.5 (L) 10/07/2017 03:17 AM    HCT 19.1 (L) 10/07/2017 03:17 AM     COAGS:   Lab Results   Component Value Date/Time    PTP 18.4 (H) 10/07/2017 03:17 AM    INR 1.6 (H) 10/07/2017 03:17 AM     Liver Panel: No results found for: ALB, CBIL, TBIL, TP, GLOB, AGRAT, SGOT, ASTPOC, ALTPOC, ALT, GPT, AP       Active Problems:    GI bleed (10/4/2017)      Symptomatic anemia (10/4/2017)      Rectal bleed (10/4/2017)      Acute gastric ulcer with hemorrhage (10/5/2017)          Signed By: Bree Fraser MD     October 7, 2017

## 2017-10-07 NOTE — PROGRESS NOTES
SUBJECTIVE:    Has no complaints. Is happy to be able to take PO liquids. OBJECTIVE:    /87  Pulse 76  Temp 98.6 °F (37 °C)  Resp 16  Ht 5' 8\" (1.727 m)  Wt 100.1 kg (220 lb 10.9 oz)  SpO2 100%  BMI 33.55 kg/m2      Neck: no JVD  CVS: IRIR, PPM +  RS: CTA bilaterally  GI: NG tube present. BS +  Extremities: no pedal edema    General: NAD, Awake    ASSESSMENT:    1. Dizziness due to # 2  2. Acute GI blood loss anemia s/p 6 units  3. Awaiting results from bleeding scan earlier today d/t recurrent rapid drop in h/h  4. Gastric ulcer from endoscopy done at outside facility on 10/03, bx positive for H Pylori  5. Chronic systolic Cardiomyopathy with AICD in place, EF 45%. Compensated. 6. History of Pulmonary HTN  7. Chronic atrial fibrillation was on coumadin  8. DM type 2 with nephropathy   9. Uremia due to UGIB  10. Hemorrhagic shock   11. High INR due to coumadin, s/p FFP yesterday. monitor  12. STRESS INDUCED LEUKOCYTOSIS . 13. Hypernatremia    PLAN:    On Levo and PPI  Will cont to monitor h+h and transfuse PRN. started on H.pylori treatment with clarithromycin based therapy   No ASA or coumadin  OBICI chart was reviewed  Check INR daily  Will give D5w and follow sodium.     Total time: 20 min    CMP:   No results found for: NA, K, CL, CO2, AGAP, GLU, BUN, CREA, GFRAA, GFRNA, CA, MG, PHOS, ALB, TBIL, TP, ALB, GLOB, AGRAT, SGOT, ALT, GPT  CBC:   Lab Results   Component Value Date/Time    HGB 8.6 (L) 10/07/2017 02:30 PM    HCT 25.5 (L) 10/07/2017 02:30 PM     COAGS:   Lab Results   Component Value Date/Time    PTP 18.4 (H) 10/07/2017 03:17 AM    INR 1.6 (H) 10/07/2017 03:17 AM

## 2017-10-08 ENCOUNTER — APPOINTMENT (OUTPATIENT)
Dept: GENERAL RADIOLOGY | Age: 72
DRG: 377 | End: 2017-10-08
Attending: INTERNAL MEDICINE
Payer: MEDICARE

## 2017-10-08 ENCOUNTER — ANESTHESIA (OUTPATIENT)
Dept: ENDOSCOPY | Age: 72
DRG: 377 | End: 2017-10-08
Payer: MEDICARE

## 2017-10-08 ENCOUNTER — ANESTHESIA EVENT (OUTPATIENT)
Dept: ENDOSCOPY | Age: 72
DRG: 377 | End: 2017-10-08
Payer: MEDICARE

## 2017-10-08 PROBLEM — K31.82: Status: ACTIVE | Noted: 2017-10-08

## 2017-10-08 LAB
ABO + RH BLD: NORMAL
ALBUMIN SERPL-MCNC: 2.7 G/DL (ref 3.4–5)
ALBUMIN/GLOB SERPL: 1.1 {RATIO} (ref 0.8–1.7)
ALP SERPL-CCNC: 43 U/L (ref 45–117)
ALT SERPL-CCNC: 23 U/L (ref 16–61)
ANION GAP SERPL CALC-SCNC: 8 MMOL/L (ref 3–18)
AST SERPL-CCNC: 23 U/L (ref 15–37)
BASOPHILS # BLD: 0 K/UL (ref 0–0.1)
BASOPHILS NFR BLD: 0 % (ref 0–2)
BILIRUB SERPL-MCNC: 0.6 MG/DL (ref 0.2–1)
BLD PROD TYP BPU: NORMAL
BLOOD GROUP ANTIBODIES SERPL: NORMAL
BNP SERPL-MCNC: 9662 PG/ML (ref 0–900)
BPU ID: NORMAL
BUN SERPL-MCNC: 37 MG/DL (ref 7–18)
BUN/CREAT SERPL: 29 (ref 12–20)
CALCIUM SERPL-MCNC: 8.2 MG/DL (ref 8.5–10.1)
CALLED TO:,BCALL1: NORMAL
CALLED TO:,BCALL2: NORMAL
CALLED TO:,BCALL3: NORMAL
CHLORIDE SERPL-SCNC: 115 MMOL/L (ref 100–108)
CO2 SERPL-SCNC: 25 MMOL/L (ref 21–32)
CREAT SERPL-MCNC: 1.27 MG/DL (ref 0.6–1.3)
CROSSMATCH RESULT,%XM: NORMAL
DIFFERENTIAL METHOD BLD: ABNORMAL
EOSINOPHIL # BLD: 0.4 K/UL (ref 0–0.4)
EOSINOPHIL NFR BLD: 4 % (ref 0–5)
ERYTHROCYTE [DISTWIDTH] IN BLOOD BY AUTOMATED COUNT: 17.9 % (ref 11.6–14.5)
GLOBULIN SER CALC-MCNC: 2.4 G/DL (ref 2–4)
GLUCOSE BLD STRIP.AUTO-MCNC: 112 MG/DL (ref 70–110)
GLUCOSE BLD STRIP.AUTO-MCNC: 139 MG/DL (ref 70–110)
GLUCOSE BLD STRIP.AUTO-MCNC: 139 MG/DL (ref 70–110)
GLUCOSE BLD STRIP.AUTO-MCNC: 149 MG/DL (ref 70–110)
GLUCOSE SERPL-MCNC: 126 MG/DL (ref 74–99)
HCT VFR BLD AUTO: 23.7 % (ref 36–48)
HCT VFR BLD AUTO: 25 % (ref 36–48)
HCT VFR BLD AUTO: 25 % (ref 36–48)
HGB BLD-MCNC: 7.8 G/DL (ref 13–16)
HGB BLD-MCNC: 8.2 G/DL (ref 13–16)
HGB BLD-MCNC: 8.4 G/DL (ref 13–16)
INR PPP: 1.3 (ref 0.8–1.2)
LYMPHOCYTES # BLD: 1.7 K/UL (ref 0.9–3.6)
LYMPHOCYTES NFR BLD: 20 % (ref 21–52)
MCH RBC QN AUTO: 28.3 PG (ref 24–34)
MCHC RBC AUTO-ENTMCNC: 32.9 G/DL (ref 31–37)
MCV RBC AUTO: 85.9 FL (ref 74–97)
MONOCYTES # BLD: 0.9 K/UL (ref 0.05–1.2)
MONOCYTES NFR BLD: 10 % (ref 3–10)
NEUTS SEG # BLD: 5.4 K/UL (ref 1.8–8)
NEUTS SEG NFR BLD: 66 % (ref 40–73)
PLATELET # BLD AUTO: 96 K/UL (ref 135–420)
PMV BLD AUTO: 11.2 FL (ref 9.2–11.8)
POTASSIUM SERPL-SCNC: 3.8 MMOL/L (ref 3.5–5.5)
PROT SERPL-MCNC: 5.1 G/DL (ref 6.4–8.2)
PROTHROMBIN TIME: 15.3 SEC (ref 11.5–15.2)
RBC # BLD AUTO: 2.76 M/UL (ref 4.7–5.5)
SODIUM SERPL-SCNC: 148 MMOL/L (ref 136–145)
SPECIMEN EXP DATE BLD: NORMAL
STATUS OF UNIT,%ST: NORMAL
TROPONIN I SERPL-MCNC: 0.04 NG/ML (ref 0–0.04)
UNIT DIVISION, %UDIV: 0
WBC # BLD AUTO: 8.3 K/UL (ref 4.6–13.2)

## 2017-10-08 PROCEDURE — 74011250636 HC RX REV CODE- 250/636: Performed by: INTERNAL MEDICINE

## 2017-10-08 PROCEDURE — 74011000258 HC RX REV CODE- 258: Performed by: INTERNAL MEDICINE

## 2017-10-08 PROCEDURE — 80053 COMPREHEN METABOLIC PANEL: CPT | Performed by: INTERNAL MEDICINE

## 2017-10-08 PROCEDURE — 65610000006 HC RM INTENSIVE CARE

## 2017-10-08 PROCEDURE — 74011250636 HC RX REV CODE- 250/636

## 2017-10-08 PROCEDURE — 76040000019: Performed by: INTERNAL MEDICINE

## 2017-10-08 PROCEDURE — 77030019988 HC FCPS ENDOSC DISP BSC -B: Performed by: INTERNAL MEDICINE

## 2017-10-08 PROCEDURE — 77030018846 HC SOL IRR STRL H20 ICUM -A: Performed by: INTERNAL MEDICINE

## 2017-10-08 PROCEDURE — 82962 GLUCOSE BLOOD TEST: CPT

## 2017-10-08 PROCEDURE — 84484 ASSAY OF TROPONIN QUANT: CPT | Performed by: INTERNAL MEDICINE

## 2017-10-08 PROCEDURE — 74011000250 HC RX REV CODE- 250: Performed by: INTERNAL MEDICINE

## 2017-10-08 PROCEDURE — 85025 COMPLETE CBC W/AUTO DIFF WBC: CPT | Performed by: INTERNAL MEDICINE

## 2017-10-08 PROCEDURE — 88305 TISSUE EXAM BY PATHOLOGIST: CPT | Performed by: INTERNAL MEDICINE

## 2017-10-08 PROCEDURE — 76060000031 HC ANESTHESIA FIRST 0.5 HR: Performed by: INTERNAL MEDICINE

## 2017-10-08 PROCEDURE — 77030004927 HC CATH ELECHEMSTAS BSC -C: Performed by: INTERNAL MEDICINE

## 2017-10-08 PROCEDURE — 74011250637 HC RX REV CODE- 250/637: Performed by: INTERNAL MEDICINE

## 2017-10-08 PROCEDURE — 83880 ASSAY OF NATRIURETIC PEPTIDE: CPT | Performed by: INTERNAL MEDICINE

## 2017-10-08 PROCEDURE — 85018 HEMOGLOBIN: CPT | Performed by: INTERNAL MEDICINE

## 2017-10-08 PROCEDURE — 88342 IMHCHEM/IMCYTCHM 1ST ANTB: CPT | Performed by: INTERNAL MEDICINE

## 2017-10-08 PROCEDURE — 36592 COLLECT BLOOD FROM PICC: CPT

## 2017-10-08 PROCEDURE — 77030008565 HC TBNG SUC IRR ERBE -B: Performed by: INTERNAL MEDICINE

## 2017-10-08 PROCEDURE — 71010 XR CHEST PORT: CPT

## 2017-10-08 PROCEDURE — C9113 INJ PANTOPRAZOLE SODIUM, VIA: HCPCS | Performed by: INTERNAL MEDICINE

## 2017-10-08 PROCEDURE — 74011250637 HC RX REV CODE- 250/637: Performed by: HOSPITALIST

## 2017-10-08 PROCEDURE — 85610 PROTHROMBIN TIME: CPT | Performed by: INTERNAL MEDICINE

## 2017-10-08 RX ORDER — PROPOFOL 10 MG/ML
INJECTION, EMULSION INTRAVENOUS
Status: DISCONTINUED | OUTPATIENT
Start: 2017-10-08 | End: 2017-10-08 | Stop reason: HOSPADM

## 2017-10-08 RX ORDER — SODIUM CHLORIDE 0.9 % (FLUSH) 0.9 %
5-10 SYRINGE (ML) INJECTION AS NEEDED
Status: CANCELLED | OUTPATIENT
Start: 2017-10-08

## 2017-10-08 RX ORDER — PROPOFOL 10 MG/ML
INJECTION, EMULSION INTRAVENOUS AS NEEDED
Status: DISCONTINUED | OUTPATIENT
Start: 2017-10-08 | End: 2017-10-08 | Stop reason: HOSPADM

## 2017-10-08 RX ORDER — SODIUM CHLORIDE, SODIUM LACTATE, POTASSIUM CHLORIDE, CALCIUM CHLORIDE 600; 310; 30; 20 MG/100ML; MG/100ML; MG/100ML; MG/100ML
75 INJECTION, SOLUTION INTRAVENOUS CONTINUOUS
Status: CANCELLED | OUTPATIENT
Start: 2017-10-08

## 2017-10-08 RX ORDER — FENTANYL CITRATE 50 UG/ML
25 INJECTION, SOLUTION INTRAMUSCULAR; INTRAVENOUS AS NEEDED
Status: CANCELLED | OUTPATIENT
Start: 2017-10-08

## 2017-10-08 RX ORDER — DEXTROSE 50 % IN WATER (D50W) INTRAVENOUS SYRINGE
25-50 AS NEEDED
Status: CANCELLED | OUTPATIENT
Start: 2017-10-08

## 2017-10-08 RX ORDER — INSULIN LISPRO 100 [IU]/ML
INJECTION, SOLUTION INTRAVENOUS; SUBCUTANEOUS ONCE
Status: CANCELLED | OUTPATIENT
Start: 2017-10-08 | End: 2017-10-08

## 2017-10-08 RX ORDER — DIPHENHYDRAMINE HYDROCHLORIDE 50 MG/ML
12.5 INJECTION, SOLUTION INTRAMUSCULAR; INTRAVENOUS
Status: CANCELLED | OUTPATIENT
Start: 2017-10-08

## 2017-10-08 RX ORDER — MAGNESIUM SULFATE 100 %
4 CRYSTALS MISCELLANEOUS AS NEEDED
Status: CANCELLED | OUTPATIENT
Start: 2017-10-08

## 2017-10-08 RX ORDER — ONDANSETRON 2 MG/ML
4 INJECTION INTRAMUSCULAR; INTRAVENOUS ONCE
Status: CANCELLED | OUTPATIENT
Start: 2017-10-08 | End: 2017-10-08

## 2017-10-08 RX ADMIN — EZETIMIBE 10 MG: 10 TABLET ORAL at 09:31

## 2017-10-08 RX ADMIN — SODIUM CHLORIDE 40 MG: 9 INJECTION INTRAMUSCULAR; INTRAVENOUS; SUBCUTANEOUS at 20:53

## 2017-10-08 RX ADMIN — SODIUM CHLORIDE 100 ML/HR: 450 INJECTION, SOLUTION INTRAVENOUS at 17:05

## 2017-10-08 RX ADMIN — FINASTERIDE 5 MG: 5 TABLET, FILM COATED ORAL at 09:31

## 2017-10-08 RX ADMIN — ALLOPURINOL 300 MG: 300 TABLET ORAL at 09:35

## 2017-10-08 RX ADMIN — SODIUM CHLORIDE 100 ML/HR: 450 INJECTION, SOLUTION INTRAVENOUS at 08:20

## 2017-10-08 RX ADMIN — PROPOFOL 30 MG: 10 INJECTION, EMULSION INTRAVENOUS at 08:42

## 2017-10-08 RX ADMIN — CLARITHROMYCIN 500 MG: 500 TABLET ORAL at 17:39

## 2017-10-08 RX ADMIN — PROPOFOL 75 MCG/KG/MIN: 10 INJECTION, EMULSION INTRAVENOUS at 08:42

## 2017-10-08 RX ADMIN — AMOXICILLIN 1000 MG: 250 CAPSULE ORAL at 09:36

## 2017-10-08 RX ADMIN — SODIUM CHLORIDE 40 MG: 9 INJECTION INTRAMUSCULAR; INTRAVENOUS; SUBCUTANEOUS at 08:19

## 2017-10-08 RX ADMIN — CLARITHROMYCIN 500 MG: 500 TABLET ORAL at 09:31

## 2017-10-08 RX ADMIN — AMOXICILLIN 1000 MG: 250 CAPSULE ORAL at 20:53

## 2017-10-08 NOTE — ROUTINE PROCESS
Bedside and Verbal shift change report given to [facility] (oncoming nurse) by Johanny Marcelino RN (offgoing nurse). Report included the following information SBAR, Kardex, MAR and Recent Results.     SITUATION:    Code Status: Full Code   Reason for Admission: Symptomatic anemia   GI bleed   UPPER GI BLEED   Rectal bleed   Symptomatic anemia   Anemia    [facility] day: 3   Problem List:       Hospital Problems  Date Reviewed: 4/22/2016          Codes Class Noted POA    Acute gastric ulcer with hemorrhage ICD-10-CM: K25.0  ICD-9-CM: 531.00  10/5/2017 Unknown        GI bleed ICD-10-CM: K92.2  ICD-9-CM: 578.9  10/4/2017 Unknown        Symptomatic anemia ICD-10-CM: D64.9  ICD-9-CM: 285.9  10/4/2017 Unknown        Rectal bleed ICD-10-CM: K62.5  ICD-9-CM: 569.3  10/4/2017 Unknown              BACKGROUND:    Past Medical History:   Past Medical History:   Diagnosis Date    Anemia     Atrial fibrillation (HCC)     BPH (benign prostatic hypertrophy)     CAD (coronary artery disease)     CHF (congestive heart failure) (HCC)     Diabetes ([facility] [address]     Gout     Hyperlipidemia     Hypertension     Pacemaker     Vitamin D deficiency          Patient taking anticoagulants no     ASSESSMENT:    Changes in Assessment Throughout Shift: nuclear blding scan completed without signs of active blding- did have 1 bldy stool(dk red) prior to last nuclear images- H/H continues q6h- Dr. Michelle Diaz updated- VSS- no nausea, no pain- tolerating po diet well     Patient has Central Line: yes Reasons if yes: instability   Patient has Mcfarland Cath: no Reasons if yes: n/a      Last Vitals:     Vitals:    10/07/17 1930 10/07/17 2000 10/07/17 2030 10/07/17 2100   BP: 128/65 124/69 132/59 129/66   Pulse: 79 79 72 75   Resp: 18 19 19 18   Temp:  98.6 °F (37 °C)     SpO2: 100% 100% 100% 100%   Weight:       Height:            IV and DRAINS (will only show if present)   Peripheral IV 10/04/17 Left Antecubital-Site Assessment: Clean, dry, & intact  Triple Lumen central line 10/05/17 Right Femoral-Site Assessment: Clean, dry, & intact  Peripheral IV 10/04/17 Right Antecubital-Site Assessment: Clean, dry, & intact     WOUND (if present)   Wound Type:  none   Dressing present Dressing Present : Yes, Intact, not due to be changed   Wound Concerns/Notes:  none     PAIN    Pain Assessment    Pain Intensity 1: 0 (10/07/17 2000)              Patient Stated Pain Goal: 0  o Interventions for Pain:  none  o Intervention effective: n/a  o Time of last intervention: n/a   o Reassessment Completed: yes      Last 3 Weights:  Last 3 Recorded Weights in this Encounter    10/05/17 0830 10/06/17 0537 10/07/17 0400   Weight: 98.3 kg (216 lb 11.4 oz) 96.7 kg (213 lb 3 oz) 100.1 kg (220 lb 10.9 oz)     Weight change: 1.8 kg (3 lb 15.5 oz)     INTAKE/OUPUT    Current Shift: 10/07 1901 - 10/08 0700  In: 900 [I.V.:900]  Out: -     Last three shifts: 10/06 0701 - 10/07 1900  In: 3926.7 [P.O.:1520; I.V.:1786.7]  Out: 3400 [Urine:3400]     LAB RESULTS     Recent Labs      10/07/17   2015  10/07/17   1430  10/07/17   0317   10/06/17   0440   10/05/17   1900  10/05/17   1030   WBC   --    --    --    --   12.1   --   14.0*  17.6*   HGB  7.8*  8.6*  6.5*   < >  7.0*   < >  7.4*  6.5*   HCT  23.6*  25.5*  19.1*   < >  20.4*   < >  21.4*  19.2*   PLT   --    --    --    --   104*   --   113*  148    < > = values in this interval not displayed. Recent Labs      10/07/17   0317  10/06/17   0440  10/05/17   1132  10/05/17   1030   NA   --   151*  147*   --    K   --   4.2  4.6   --    GLU   --   185*  279*   --    BUN   --   99*  107*   --    CREA   --   1.88*  1.92*   --    CA   --   8.2*  8.0*   --    INR  1.6*   --    --   1.9*       RECOMMENDATIONS AND DISCHARGE PLANNING     1. Pending tests/procedures/ Plan of Care or Other Needs: npo after mn for possible EGD in am- am labs- q6h H/H     2. Discharge plan for patient and Needs/Barriers: unk    3.  Estimated Discharge Date: unk Posted on Whiteboard in Patients Room: no      4. The patient's care plan was reviewed with the oncoming nurse. \"HEALS\" SAFETY CHECK      Fall Risk    Total Score: 2    Safety Measures: Safety Measures: Bed/Chair-Wheels locked, Bed in low position, Call light within reach, Emergency bedside equipment, Fall prevention (comment)    A safety check occurred in the patient's room between off going nurse and oncoming nurse listed above. The safety check included the below items  Area Items   H  High Alert Medications - Verify all high alert medication drips (heparin, PCA, etc.)   E  Equipment - Suction is set up for ALL patients (with myrna)  - Red plugs utilized for all equipment (IV pumps, etc.)  - WOWs wiped down at end of shift.  - Room stocked with oxygen, suction, and other unit-specific supplies   A  Alarms - Bed alarm is set for fall risk patients  - Ensure chair alarm is in place and activated if patient is up in a chair   L  Lines - Check IV for any infiltration  - Mcfarland bag is empty if patient has a Mcfarland   - Tubing and IV bags are labeled   S  Safety   - Room is clean, patient is clean, and equipment is clean. - Hallways are clear from equipment besides carts. - Fall bracelet on for fall risk patients  - Ensure room is clear and free of clutter  - Suction is set up for ALL patients (with myrna)  - Hallways are clear from equipment besides carts.    - Isolation precautions followed, supplies available outside room, sign posted     Nena Rasmussen RN

## 2017-10-08 NOTE — PROCEDURES
[facility]  [facility], [address]    Procedure Note    Patient: Sammy Keith MRN: [id number]  SSN: [id number]    YOB: 1945  Age: 74 y.o. Sex: male      Date/Time:  10/8/2017 8:58 AM    Esophagogastroduodenoscopy (EGD) Procedure Note    Procedure: Esophagogastroduodenoscopy with biopsy, control of bleeding    Impression:    -Dieulafoy's lesion upper gastric body lesser curve with visible vessel s/p injection with 0.8cc ETOH (bipolar cautery not available)  -2 shallow clean based gastric body ulcers without stigmata  -prominent nodular type gastritis s/p bx  Recommendations:  -continue BID PPI -clear liquids today -monitor hct every 6 hours and transfuse for hct <23% -advance diet in am if no further bleeding     Indication:  Melena/hematochezia  Pre-operative Diagnosis: see indication above  Post-operative Diagnosis: see findings below  :  Emily Watson MD  Referring Provider:   Xiomy Bhardwaj MD    Exam:  Airway: clear, no airway problems anticipated  Heart: RRR, without gallops or rubs  Lungs: clear bilaterally without wheezes, crackles, or rhonchi  Abdomen: soft, nontender, nondistended, bowel sounds present  Mental Status: awake, alert and oriented to person, place and time     Anethesia/Sedation:  MAC anesthesia Propofol  Procedure Details   After informed consent was obtained for the procedure, with all risks and benefits of procedure explained the patient was taken to the endoscopy suite and placed in the left lateral decubitus position. Following sequential administration of sedation as per above, the [id number] gastroscope was inserted into the mouth and advanced under direct vision to third portion of the duodenum. A careful inspection was made as the gastroscope was withdrawn, including a retroflexed view of the proximal stomach; findings and interventions are described below. Findings: 1.  Dieulafoys lesion high on lesser curve in upper gastric body with visable vessel , injected with 0.8cc etoh with good hemostasis 2. 2 shallow gastric body ulcers without stigmata of recent bleeding 3. nodular gastric bxed suspect intestinal metaplasia      Therapies:  Injection of 0.8cc ETOH  Specimens: antrum  Estimated blood loss:  none           Complications:   None; patient tolerated the procedure well. Discharge disposition:   To [facility]aya    Celio Coto MD

## 2017-10-08 NOTE — ROUTINE PROCESS
Pt is post endo awake and alert, given results of endo clear liquids taken at this time   TRANSFER - IN REPORT:    Verbal report received from Merry(name) on Johanny Higgins  being received from OR(unit) for routine progression of care      Report consisted of patients Situation, Background, Assessment and   Recommendations(SBAR). Information from the following report(s) SBAR, Kardex and MAR was reviewed with the receiving nurse. Opportunity for questions and clarification was provided. Assessment completed upon patients arrival to unit and care assumed.    Report was done at 0900 via phone

## 2017-10-08 NOTE — ANESTHESIA PREPROCEDURE EVALUATION
Anesthetic History               Review of Systems / Medical History  Patient summary reviewed and pertinent labs reviewed    Pulmonary                   Neuro/Psych              Cardiovascular    Hypertension        Dysrhythmias   CAD         GI/Hepatic/Renal           PUD     Endo/Other    Diabetes         Other Findings   Comments:   Risk Factors for Postoperative nausea/vomiting:       History of postoperative nausea/vomiting? NO       Female? NO       Motion sickness? NO       Intended opioid administration for postoperative analgesia? NO      Smoking Abstinence  Current Smoker? NO  Elective Surgery? YES  Seen preoperatively by anesthesiologist or proxy prior to day of surgery? YES  Pt abstained from smoking 24 hours prior to anesthesia?  N/A         Physical Exam    Airway  Mallampati: II  TM Distance: 4 - 6 cm  Neck ROM: normal range of motion   Mouth opening: Normal     Cardiovascular  Regular rate and rhythm,  S1 and S2 normal,  no murmur, click, rub, or gallop  Rhythm: regular  Rate: normal         Dental  No notable dental hx       Pulmonary  Breath sounds clear to auscultation               Abdominal  GI exam deferred       Other Findings            Anesthetic Plan    ASA: 3  Anesthesia type: MAC          Induction: Intravenous  Anesthetic plan and risks discussed with: Patient and Family

## 2017-10-08 NOTE — PERIOP NOTES
TRANSFER - OUT REPORT:    Verbal report given to Zoey DUFFY(name) on Lawrence Gupta  being transferred to [facility](unit) for routine post - op       Report consisted of patients Situation, Background, Assessment and   Recommendations(SBAR). Information from the following report(s) SBAR, Procedure Summary and MAR was reviewed with the receiving nurse. Lines:   Triple Lumen central line 10/05/17 Right Femoral (Active)   Central Line Being Utilized Yes 10/7/2017  8:00 PM   Criteria for Appropriate Use Hemodynamically unstable, requiring monitoring lines, vasopressors, or volume resuscitation 10/7/2017  8:00 PM   Site Assessment Clean, dry, & intact 10/7/2017  8:00 PM   Infiltration Assessment 0 10/7/2017  8:00 PM   Affected Extremity/Extremities Color distal to insertion site pink (or appropriate for race) 10/7/2017  8:00 PM   Date of Last Dressing Change 10/04/17 10/7/2017  8:00 PM   Dressing Status Clean, dry, & intact 10/7/2017  8:00 PM   Dressing Type Disk with Chlorhexadine gluconate (CHG) 10/7/2017  8:00 PM   Action Taken Tubing changed 10/6/2017  8:00 AM   Proximal Hub Color/Line Status White; Infusing 10/7/2017  8:00 PM   Positive Blood Return (Medial Site) Yes 10/7/2017  8:00 PM   Medial Hub Color/Line Status Blue;Capped;Flushed 10/7/2017  8:00 PM   Positive Blood Return (Lateral Site) Yes 10/7/2017  8:00 PM   Distal Hub Color/Line Status Brown;Capped;Flushed 10/7/2017  8:00 PM   Positive Blood Return (Site #3) Yes 10/7/2017  8:00 PM       Peripheral IV 10/04/17 Right Antecubital (Active)   Site Assessment Clean, dry, & intact 10/7/2017  8:00 PM   Phlebitis Assessment 0 10/7/2017  8:00 PM   Infiltration Assessment 0 10/7/2017  8:00 PM   Dressing Status Clean, dry, & intact 10/7/2017  8:00 PM   Dressing Type Transparent 10/7/2017  8:00 PM   Hub Color/Line Status Pink;Capped;Flushed 10/7/2017  8:00 PM   Action Taken Blood drawn 10/4/2017  8:01 PM   Alcohol Cap Used Yes 10/5/2017  8:00 PM       Peripheral IV 10/04/17 Left Antecubital (Active)   Site Assessment Clean, dry, & intact 10/7/2017  8:00 PM   Phlebitis Assessment 0 10/7/2017  8:00 PM   Infiltration Assessment 0 10/7/2017  8:00 PM   Dressing Status Clean, dry, & intact 10/7/2017  8:00 PM   Dressing Type Transparent 10/7/2017  8:00 PM   Hub Color/Line Status Pink;Capped;Flushed 10/7/2017  8:00 PM        Opportunity for questions and clarification was provided.       Patient transported with:   Monitor  Registered Nurse  Tech

## 2017-10-08 NOTE — ROUTINE PROCESS
Asleep on walking rounds easily aroused by verbal stimuli, denies discomfort at this time. SR up x 4,. CB/telephone at hand. 2200  No change in status. SR up x 4. CB/telephone at hand. 0000  No change in status. Accu check is 112 no coverage is required. SR up x 4. CB/telephone at hand. 0300  Awake watching television without complaints voiced.   S up x 4. CB/telephone at handl

## 2017-10-08 NOTE — ROUTINE PROCESS
Bedside and Verbal shift change report given to Valerie (oncoming nurse) by Aaliyah Mccabe (offgoing nurse). Report included the following information SBAR, Kardex and MAR.

## 2017-10-08 NOTE — PROGRESS NOTES
[facility]. Pulmonary, Critical Care, and Sleep Medicine    Name: Cary Soulier MRN: [id number]   : 1945 Hospital: [facility]   Date: 10/8/2017  Admission Date: 10/4/2017     Chart and notes reviewed. Data reviewed. I have evaluated all findings. Overnight, patient had a melanotic and a maroon moderate volume stool. Patient has returned from endoscopy, where a Dieulafoy's visible vessel of the gastric lesser curve was found and injected. Also 2 shallow clean base gastric body ulcers without active bleeding stigmata. Pt awake and alert, no active GI bleeding now. [x]I have reviewed the flowsheet and previous days notes. [x]The patient is critically ill on      []Mechanical ventilation []Pressors   []BiPAP [x]GI bleeding               Events and notes from last 24 hours reviewed. Care plan discussed with RN staff. Labs ordered.   Patient Active Problem List   Diagnosis Code    Hypertension I10    Diabetes ([facility] [address] E11.9    Hyperlipidemia E78.5    Gout M10.9    Pacemaker Z95.0    Vitamin D deficiency E55.9    CHF (congestive heart failure) (HCC) I50.9    BPH (benign prostatic hypertrophy) N40.0    Anemia D64.9    Atrial fibrillation (HCC) I48.91    GI bleed K92.2    Symptomatic anemia D64.9    Rectal bleed K62.5    Acute gastric ulcer with hemorrhage K25.0    Dieulafoy lesion (hemorrhagic) of stomach and duodenum (CODE) K31.82     Vital Signs:  Visit Vitals    /72    Pulse 80    Temp 98.9 °F (37.2 °C)    Resp 18    Ht 5' 8\" (1.727 m)    Wt 100.4 kg (221 lb 5.5 oz)    SpO2 100%    BMI 33.65 kg/m2       O2 Device: Nasal cannula       Temp (24hrs), Av.4 °F (36.9 °C), Min:98 °F (36.7 °C), Max:98.9 °F (37.2 °C)       Intake/Output Summary (Last 24 hours) at 10/08/17 1602  Last data filed at 10/08/17 1514   Gross per 24 hour   Intake             2640 ml   Output             1300 ml   Net             1340 ml      Ventilator Settings: n/a Current Facility-Administered Medications   Medication Dose Route Frequency    0.45% sodium chloride infusion  100 mL/hr IntraVENous CONTINUOUS    pantoprazole (PROTONIX) 40 mg in sodium chloride 0.9 % 10 mL injection  40 mg IntraVENous Q12H    clarithromycin (BIAXIN) tablet 500 mg  500 mg Oral BID    amoxicillin (AMOXIL) capsule 1,000 mg  1,000 mg Oral Q12H    NOREPINephrine (LEVOPHED) 8,000 mcg in dextrose 5% 250 mL infusion  2-16 mcg/min IntraVENous TITRATE    insulin lispro (HUMALOG) injection   SubCUTAneous Q6H    allopurinol (ZYLOPRIM) tablet 300 mg  300 mg Oral DAILY    ezetimibe (ZETIA) tablet 10 mg  10 mg Oral DAILY    finasteride (PROSCAR) tablet 5 mg  5 mg Oral DAILY    docusate sodium (COLACE) capsule 100 mg  100 mg Oral BID       Telemetry:     Physical Exam:   General: in no apparent distress, mildly sedated upon return from EGD. HEENT: Normal, no orthostatic BP changes today   Neck: No abnormally enlarged lymph nodes. Chest: normal   Lungs: normal air entry, no dullness/ tenderness/ rash   Heart: Regular rate and rhythm   Abdomen: non distended, bowel sounds normoactive, tympanic, abdomen is soft without significant tenderness, masses, organomegaly or guarding   Extremity: negative   Neuro: alert   Skin: Skin color, texture, turgor normal. No rashes or lesions   DATA:  MAR reviewed and pertinent medications noted or modified as needed    Recent Labs      10/08/17   1400  10/08/17   0430  10/07/17   2015   10/06/17   0440   10/05/17   1900   WBC   --   8.3   --    --   12.1   --   14.0*   HGB  8.2*  7.8*  7.8*   < >  7.0*   < >  7.4*   HCT  25.0*  23.7*  23.6*   < >  20.4*   < >  21.4*   PLT   --   96*   --    --   104*   --   113*    < > = values in this interval not displayed.      Recent Labs      10/08/17   0430  10/07/17   0317  10/06/17   0440   NA  148*   --   151*   K  3.8   --   4.2   CL  115*   --   120*   CO2  25   --   21   GLU  126*   --   185*   BUN  37*   --   99* CREA  1.27   --   1.88*   CA  8.2*   --   8.2*   ALB  2.7*   --    --    SGOT  23   --    --    ALT  23   --    --    INR  1.3*  1.6*   --    Imaging:  [x]                           I have personally reviewed the patients radiographs and reports    High complexity decision making was performed during this consultation and evaluation. [x]       Pt is at high risk for further organ failure and dysfunction. Critical care time spent 40 minutes. IMPRESSION:   · Acute intermittent large GI bleeding episodes due to Dieulafoy's lesion, injected with EtOH (no cautery available for procedure), mgmt per GI  · Acute renal failure on CKD, improving  · Anemia of blood loss, monitor h/h q6h, will transfuse for hct <23 per GI  · Monitor in ICU until h/h stable  · Coagulopathy of blood loss resolved  · Diverticulosis, not bleeding  · No active pulmonary issues  · Hyperglycemia, hypernatremia  · AICD  · Pulmonary hypertension  · Cardiomyopathy, afib on chronic coumadin  · DM with recent HbA1c at goal  · Other problems per PMH list; inactive      PLAN:   As above  Re-start anticoagulation for chronic afib when GI approves; monitor levels closely     Felicitas Patiño MD   10/8/2017 4:11 PM  Dr. Nava Hathaway covering ICU tomorrow.

## 2017-10-08 NOTE — ANESTHESIA POSTPROCEDURE EVALUATION
Post-Anesthesia Evaluation and Assessment    Patient: Tammy Adame MRN: [id number]  SSN: [id number]    YOB: 1945  Age: 74 y.o. Sex: male       Cardiovascular Function/Vital Signs  Visit Vitals    /73    Pulse 87    Temp 36.8 °C (98.2 °F)    Resp 15    Ht 5' 8\" (1.727 m)    Wt 100.4 kg (221 lb 5.5 oz)    SpO2 99%    BMI 33.65 kg/m2       Patient is status post No value filed. anesthesia for Procedure(s):  ESOPHAGOGASTRODUODENOSCOPY (EGD) w/ biopsies and injection. Nausea/Vomiting: None    Postoperative hydration reviewed and adequate. Pain:  Pain Scale 1: Numeric (0 - 10) (10/08/17 0400)  Pain Intensity 1: 0 (10/08/17 0400)   Managed    Neurological Status:   Neuro  Neurologic State: Alert (10/07/17 2000)  Orientation Level: Oriented X4 (10/07/17 2000)  Cognition: Follows commands (10/07/17 2000)  Speech: Clear (10/07/17 2000)   At baseline    Mental Status and Level of Consciousness: Alert and oriented     Pulmonary Status:   O2 Device: Nasal cannula (10/08/17 0858)   Adequate oxygenation and airway patent    Complications related to anesthesia: None    Post-anesthesia assessment completed.  No concerns    Signed By: Jackson Noriega CRNA     October 8, 2017

## 2017-10-08 NOTE — H&P
Date of Surgery Update:  Lito Gurrola was seen and examined. History and physical has been reviewed. The patient has been examined.  There have been no significant clinical changes since the completion of the originally dated History and Physical.    Signed By: Flako Silver MD     October 8, 2017 8:24 AM

## 2017-10-09 LAB
ALBUMIN SERPL-MCNC: 2.6 G/DL (ref 3.4–5)
ALBUMIN/GLOB SERPL: 1.1 {RATIO} (ref 0.8–1.7)
ALP SERPL-CCNC: 47 U/L (ref 45–117)
ALT SERPL-CCNC: 19 U/L (ref 16–61)
ANION GAP SERPL CALC-SCNC: 10 MMOL/L (ref 3–18)
AST SERPL-CCNC: 20 U/L (ref 15–37)
BILIRUB SERPL-MCNC: 0.8 MG/DL (ref 0.2–1)
BUN SERPL-MCNC: 21 MG/DL (ref 7–18)
BUN/CREAT SERPL: 18 (ref 12–20)
CALCIUM SERPL-MCNC: 7.9 MG/DL (ref 8.5–10.1)
CHLORIDE SERPL-SCNC: 114 MMOL/L (ref 100–108)
CO2 SERPL-SCNC: 23 MMOL/L (ref 21–32)
CREAT SERPL-MCNC: 1.15 MG/DL (ref 0.6–1.3)
GLOBULIN SER CALC-MCNC: 2.4 G/DL (ref 2–4)
GLUCOSE BLD STRIP.AUTO-MCNC: 101 MG/DL (ref 70–110)
GLUCOSE BLD STRIP.AUTO-MCNC: 110 MG/DL (ref 70–110)
GLUCOSE BLD STRIP.AUTO-MCNC: 118 MG/DL (ref 70–110)
GLUCOSE BLD STRIP.AUTO-MCNC: 138 MG/DL (ref 70–110)
GLUCOSE BLD STRIP.AUTO-MCNC: 159 MG/DL (ref 70–110)
GLUCOSE SERPL-MCNC: 100 MG/DL (ref 74–99)
HCT VFR BLD AUTO: 24.6 % (ref 36–48)
HCT VFR BLD AUTO: 25.3 % (ref 36–48)
HCT VFR BLD AUTO: 26 % (ref 36–48)
HGB BLD-MCNC: 8.1 G/DL (ref 13–16)
HGB BLD-MCNC: 8.3 G/DL (ref 13–16)
HGB BLD-MCNC: 8.4 G/DL (ref 13–16)
POTASSIUM SERPL-SCNC: 3.4 MMOL/L (ref 3.5–5.5)
PROT SERPL-MCNC: 5 G/DL (ref 6.4–8.2)
SODIUM SERPL-SCNC: 147 MMOL/L (ref 136–145)

## 2017-10-09 PROCEDURE — 82962 GLUCOSE BLOOD TEST: CPT

## 2017-10-09 PROCEDURE — 74011250636 HC RX REV CODE- 250/636: Performed by: INTERNAL MEDICINE

## 2017-10-09 PROCEDURE — 74011250637 HC RX REV CODE- 250/637: Performed by: INTERNAL MEDICINE

## 2017-10-09 PROCEDURE — 85018 HEMOGLOBIN: CPT | Performed by: INTERNAL MEDICINE

## 2017-10-09 PROCEDURE — 74011000258 HC RX REV CODE- 258: Performed by: INTERNAL MEDICINE

## 2017-10-09 PROCEDURE — 65270000029 HC RM PRIVATE

## 2017-10-09 PROCEDURE — C9113 INJ PANTOPRAZOLE SODIUM, VIA: HCPCS | Performed by: INTERNAL MEDICINE

## 2017-10-09 PROCEDURE — 74011000250 HC RX REV CODE- 250: Performed by: INTERNAL MEDICINE

## 2017-10-09 PROCEDURE — 36592 COLLECT BLOOD FROM PICC: CPT

## 2017-10-09 PROCEDURE — 80053 COMPREHEN METABOLIC PANEL: CPT | Performed by: INTERNAL MEDICINE

## 2017-10-09 PROCEDURE — 74011250637 HC RX REV CODE- 250/637: Performed by: HOSPITALIST

## 2017-10-09 RX ORDER — SODIUM CHLORIDE 450 MG/100ML
100 INJECTION, SOLUTION INTRAVENOUS CONTINUOUS
Status: DISCONTINUED | OUTPATIENT
Start: 2017-10-09 | End: 2017-10-10 | Stop reason: HOSPADM

## 2017-10-09 RX ADMIN — SODIUM CHLORIDE 40 MG: 9 INJECTION INTRAMUSCULAR; INTRAVENOUS; SUBCUTANEOUS at 08:32

## 2017-10-09 RX ADMIN — SODIUM CHLORIDE 100 ML/HR: 450 INJECTION, SOLUTION INTRAVENOUS at 18:04

## 2017-10-09 RX ADMIN — CLARITHROMYCIN 500 MG: 500 TABLET ORAL at 08:32

## 2017-10-09 RX ADMIN — EZETIMIBE 10 MG: 10 TABLET ORAL at 08:32

## 2017-10-09 RX ADMIN — AMOXICILLIN 1000 MG: 250 CAPSULE ORAL at 20:54

## 2017-10-09 RX ADMIN — CLARITHROMYCIN 500 MG: 500 TABLET ORAL at 18:05

## 2017-10-09 RX ADMIN — DOCUSATE SODIUM 100 MG: 100 CAPSULE, LIQUID FILLED ORAL at 18:05

## 2017-10-09 RX ADMIN — AMOXICILLIN 1000 MG: 250 CAPSULE ORAL at 08:32

## 2017-10-09 RX ADMIN — FINASTERIDE 5 MG: 5 TABLET, FILM COATED ORAL at 08:32

## 2017-10-09 RX ADMIN — ALLOPURINOL 300 MG: 300 TABLET ORAL at 08:32

## 2017-10-09 RX ADMIN — SODIUM CHLORIDE 40 MG: 9 INJECTION INTRAMUSCULAR; INTRAVENOUS; SUBCUTANEOUS at 20:54

## 2017-10-09 NOTE — PROGRESS NOTES
[facility]. Pulmonary, Critical Care, and Sleep Medicine    Name: Cary Soulier MRN: [id number]   : 1945 Hospital: [facility]   Date: 10/9/2017  Admission Date: 10/4/2017     Subjective:  Pt is as 74 male who was admitted for an acute Intermittent UGI bleed episodes secondary Dieulafoy's lesion on 10/04/17 and received a total of s/p 9 units PRBCs and 5 units FFP (was on coumadin), now s/p EGD 10/08/17 where lesion was injected with EtOH. 10/09/17   - HD stable overnight with paced rhythm. - Stable on RA, pt had one melanotic stool since EGD was performed. - Urinating well with no drew in place.  - Tolerating clear liquid diet  - Denies pain, SOB, palpitations, peripheral edema, n/v/d, fevers/chills, hematemesis, BRBPR, dizziness, HA, or any other complaints at this time. Events and notes from last 24 hours reviewed. Care plan discussed with RN staff. Labs ordered.   Patient Active Problem List   Diagnosis Code    Hypertension I10    Diabetes ([facility] [address] E11.9    Hyperlipidemia E78.5    Gout M10.9    Pacemaker Z95.0    Vitamin D deficiency E55.9    CHF (congestive heart failure) (HCC) I50.9    BPH (benign prostatic hypertrophy) N40.0    Anemia D64.9    Atrial fibrillation (HCC) I48.91    GI bleed K92.2    Symptomatic anemia D64.9    Rectal bleed K62.5    Acute gastric ulcer with hemorrhage K25.0    Dieulafoy lesion (hemorrhagic) of stomach and duodenum (CODE) K31.82     Vital Signs:  Visit Vitals    /72    Pulse 79    Temp 98.8 °F (37.1 °C)    Resp 17    Ht 5' 8\" (1.727 m)    Wt 102.7 kg (226 lb 6.6 oz)    SpO2 97%    BMI 34.43 kg/m2       O2 Device: Room air       Temp (24hrs), Av.9 °F (37.2 °C), Min:98.7 °F (37.1 °C), Max:99.2 °F (37.3 °C)         Intake/Output Summary (Last 24 hours) at 10/09/17 1421  Last data filed at 10/09/17 1417   Gross per 24 hour   Intake             1960 ml   Output             1625 ml   Net              335 ml      Current Facility-Administered Medications   Medication Dose Route Frequency    0.45% sodium chloride infusion  100 mL/hr IntraVENous CONTINUOUS    pantoprazole (PROTONIX) 40 mg in sodium chloride 0.9 % 10 mL injection  40 mg IntraVENous Q12H    clarithromycin (BIAXIN) tablet 500 mg  500 mg Oral BID    amoxicillin (AMOXIL) capsule 1,000 mg  1,000 mg Oral Q12H    NOREPINephrine (LEVOPHED) 8,000 mcg in dextrose 5% 250 mL infusion  2-16 mcg/min IntraVENous TITRATE    insulin lispro (HUMALOG) injection   SubCUTAneous Q6H    allopurinol (ZYLOPRIM) tablet 300 mg  300 mg Oral DAILY    ezetimibe (ZETIA) tablet 10 mg  10 mg Oral DAILY    finasteride (PROSCAR) tablet 5 mg  5 mg Oral DAILY    docusate sodium (COLACE) capsule 100 mg  100 mg Oral BID     Physical Exam:    General: A&O x 4 in NAD. HEENT: Oral mucosa, lips, and tongue normal and moist.     Neck: No abnormally enlarged lymph nodes. Chest: normal   Lungs: Normal air entry, no dullness/ tenderness/ rash. CTAB. No wheezing, rales, or rhonchi. Heart: currently paced regular rhythm with normal rate. No murmur noted. Abdomen: non-distended, bowel sounds normoactive, tympanic, abdomen is soft without significant tenderness, masses, organomegaly or guarding   Extremity: No cyanosis or edema    Neuro: Non-focal.    Skin: Skin color, texture, turgor normal. No rashes or lesions   DATA:  MAR reviewed and pertinent medications noted or modified as needed    Recent Labs      10/09/17   0830  10/09/17   0325  10/08/17   2007   10/08/17   0430   WBC   --    --    --    --   8.3   HGB  8.4*  8.1*  8.4*   < >  7.8*   HCT  26.0*  24.6*  25.0*   < >  23.7*   PLT   --    --    --    --   96*    < > = values in this interval not displayed.      Recent Labs      10/09/17   0325  10/08/17   0430  10/07/17   0317   NA  147*  148*   --    K  3.4*  3.8   --    CL  114*  115*   --    CO2  23  25   --    GLU  100*  126*   --    BUN  21*  37*   --    CREA  1.15  1.27 --    CA  7.9*  8.2*   --    ALB  2.6*  2.7*   --    SGOT  20  23   --    ALT  19  23   --    INR   --   1.3*  1.6*   Imaging: no new imaging     Critical care time spent 40 minutes. IMPRESSION:   · Acute Intermittent Large UGI bleeding episodes due to Dieulafoy's lesion, injected with EtOH (no cautery available for procedure) s/p EGD on 10/08/17   · Acute Kidney Injury- resolved   · Acute anemia of blood loss- secondary to above   · Diverticulosis, not bleeding  · Hypernatremia- improving   · Hyperglycemia with hx of DM- improving   · Previous AICD placement secondary to cardiomyopathy/CAD   · Atrial Fibrillation- rate controlled   · Pulmonary hypertension  · Hx of Hypertension-controlled   · Hx of Gout    PLAN:   · Resp- No acute issues, on RA.   · ID- Afebrile, No leukocytosis. Pt placed on amoxicillin and clarithromycin by primary team to cover for H. Pylori. · CV- HD stable, off pressors. Restart home BP meds as BP tolerates. Continue zetia. · Hem- Continue to monitor H/H q 6 hrs, transfuse < 23% per GI. Hold aspirin. · Met- Monitor and replace lytes per protocol. Daily BMP. 1/2 NS @ 100 ml/hr. · Renal- PETE improved, Monitor Cr and UOP. Continue Allopurinol and Finasteride. · Endocrine- SSI per protocol. · Neuro- PRN pain meds. · GI- Protonix BID for GI bleeding. Diet: clear liquids and advance as tolerated. GI Following. Colace. · DVT Prophylaxis (SCDs, no heparin), GI (on Protonix)   · Discussed with Dr. Vega Khan during interdisciplinary rounds. · Okay to transfer to Med/Surg with remote telemetry. PCCM signing off.       Audrey Purdy PA-C   10/9/2017 2:21 PM

## 2017-10-09 NOTE — PROGRESS NOTES
[email or web address]  [phone]       Impression  1. Gi bleed- 10/5/17 EGD clean based gastric body ulcer at site of prior cautery. 10/8 EGD- -Dieulafoy's lesion upper gastric body lesser curve with visible vessel s/p injection with 0.8cc ETOH (bipolar cautery not available)  -2 shallow clean based gastric body ulcers without stigmata  -prominent nodular type gastritis s/p bx  2. Blood loss anemia  3. Cardiomyopathy- AICD   4. Pulm HTN   5. Afib  6. DM  7. Renal dysfuncyion    Plan:  1. Continue PPI BID  2. Monitor H/H every 6 hours  transfuse hgb<23%  3. Continue monitor for overt GI hemorrhage  4. Advance diet       Chief Complaint: GI bleed    Subjective:  Pt uneventful overnight.  Pt denies melena, hematochezia, hematemesis        Eyes: conjunctiva normal, EOM normal   ENT: Mucous membranes moist, no lesion or thrush   Cardiovascular:  normal rate and regular rhythm, no murmur   Pulmonary/Chest Wall: breath sounds normal and effort normal   Abdominal:  soft, non-acute, non-tender, no appreciable mass or hepatosplenomegaly, no appreciable ascites       Visit Vitals    /78    Pulse 70    Temp 98.7 °F (37.1 °C)    Resp 15    Ht 5' 8\" (1.727 m)    Wt 102.7 kg (226 lb 6.6 oz)    SpO2 100%    BMI 34.43 kg/m2           Intake/Output Summary (Last 24 hours) at 10/09/17 0909  Last data filed at 10/09/17 0742   Gross per 24 hour   Intake             1400 ml   Output             1375 ml   Net               25 ml       CBC w/Diff    Lab Results   Component Value Date/Time    WBC 8.3 10/08/2017 04:30 AM    RBC 2.76 (L) 10/08/2017 04:30 AM    HGB 8.1 (L) 10/09/2017 03:25 AM    HCT 24.6 (L) 10/09/2017 03:25 AM    MCV 85.9 10/08/2017 04:30 AM    MCH 28.3 10/08/2017 04:30 AM    MCHC 32.9 10/08/2017 04:30 AM    RDW 17.9 (H) 10/08/2017 04:30 AM    PLT 96 (L) 10/08/2017 04:30 AM    Lab Results   Component Value Date/Time    GRANS 66 10/08/2017 04:30 AM    LYMPH 20 (L) 10/08/2017 04:30 AM    EOS 4 10/08/2017 04:30 AM BASOS 0 10/08/2017 04:30 AM      Basic Metabolic Profile   Recent Labs      10/09/17   0325   NA  147*   K  3.4*   CL  114*   CO2  23   BUN  21*   CA  7.9*        Hepatic Function    Lab Results   Component Value Date/Time    ALB 2.6 (L) 10/09/2017 03:25 AM    TP 5.0 (L) 10/09/2017 03:25 AM    AP 47 10/09/2017 03:25 AM    Lab Results   Component Value Date/Time    SGOT 20 10/09/2017 03:25 AM          Joaquin Winslow NP  [facility], [facility]  [email or web address]

## 2017-10-09 NOTE — PROGRESS NOTES
SUBJECTIVE:  No complaints. Per nursing pt had melanotic stool within last 24 hrs, less in quantity than before. OBJECTIVE:    /72  Pulse 79  Temp 98.1 °F (36.7 °C)  Resp 17  Ht 5' 8\" (1.727 m)  Wt 102.7 kg (226 lb 6.6 oz)  SpO2 97%  BMI 34.43 kg/m2      Neck: no JVD  CVS: IRIR, PPM +  RS: CTA bilaterally  GI: NG tube present. BS +  Extremities: no pedal edema    General: NAD, Awake    ASSESSMENT:    -Upper GI bleed with finding of gastric ulcer while at [facility] on 10/03 s/p bx + for H.pylori. S/p repeat upper endo on 10/08 secondary to hct that trended down, with finding of Dieulafoy's lesion and 2 shallow clean based gastric body ulcers w/o stigmata. On clarithromycin based rx for H.pylori. H+H stable. Will advance diet to regular.    -Hypernatremia-improving with D5. Will cont to follow and treat. -Type 2DM but A1C within normal limits but blood sugars are elevated at times. Will cont corrective insulin for now. Normal A1c maybe secondary to anemia. Dispo:  Home tomorrow if able to ambulate and hgb remains stable.         CMP:   Lab Results   Component Value Date/Time     (H) 10/09/2017 03:25 AM    K 3.4 (L) 10/09/2017 03:25 AM     (H) 10/09/2017 03:25 AM    CO2 23 10/09/2017 03:25 AM    AGAP 10 10/09/2017 03:25 AM     (H) 10/09/2017 03:25 AM    BUN 21 (H) 10/09/2017 03:25 AM    CREA 1.15 10/09/2017 03:25 AM    GFRAA >60 10/09/2017 03:25 AM    GFRNA >60 10/09/2017 03:25 AM    CA 7.9 (L) 10/09/2017 03:25 AM    ALB 2.6 (L) 10/09/2017 03:25 AM    TP 5.0 (L) 10/09/2017 03:25 AM    GLOB 2.4 10/09/2017 03:25 AM    AGRAT 1.1 10/09/2017 03:25 AM    SGOT 20 10/09/2017 03:25 AM    ALT 19 10/09/2017 03:25 AM     CBC:   Lab Results   Component Value Date/Time    HGB 8.4 (L) 10/09/2017 08:30 AM    HCT 26.0 (L) 10/09/2017 08:30 AM     COAGS:   No results found for: APTT, PTP, INR

## 2017-10-09 NOTE — PROGRESS NOTES
attended the interdisciplinary rounds for Maggie Walter, who is a 74 y.o.,male. Patients Primary Language is: [place]. According to the patients EMR Sikhism Affiliation is: No Anglican. The reason the Patient came to the hospital is:   Patient Active Problem List    Diagnosis Date Noted    Dieulafoy lesion (hemorrhagic) of stomach and duodenum (CODE) 10/08/2017    Acute gastric ulcer with hemorrhage 10/05/2017    GI bleed 10/04/2017    Symptomatic anemia 10/04/2017    Rectal bleed 10/04/2017    Hypertension     Diabetes ([facility] [address]     Hyperlipidemia     Gout     Pacemaker     Vitamin D deficiency     CHF (congestive heart failure) (HCC)     BPH (benign prostatic hypertrophy)     Anemia     Atrial fibrillation ([facility] [address]           Plan:  Chaplains will continue to follow and will provide pastoral care on an as needed/requested basis.  recommends bedside caregivers page  on duty if patient shows signs of acute spiritual or emotional distress.     [address] [facility]  Board Certified [address] [place] [address]   [phone]

## 2017-10-09 NOTE — PROGRESS NOTES
NUTRITION    Nutrition Screen      RECOMMENDATIONS / PLAN:     - Recommend advancing diet as tolerated. - Continue RD inpatient monitoring and evaluation. NUTRITION INTERVENTIONS & DIAGNOSIS:     [x] Meals/Snacks: modified diet  [x] Coordination of care: interdisciplinary rounds     Nutrition Diagnosis: Inadequate oral intake related to inability to tolerate po with altered GI function as evidenced by pt NPO with GI bleed  . ASSESSMENT:     10/9: S/p EGD and started on clear liquids. 10/6: NGT discontinued yesterday after EDG. GI bleed improving per RN. Pt remain NPO. GI to evaluate ability to tolerate po and start diet. 10/5: Pt NPO with GI bleed and plan for endoscopy today.      Average po intake adequate to meet patients estimated nutritional needs:   [] Yes     [x] No   [] Unable to determine at this time    Diet: DIET CLEAR LIQUID      Food Allergies: NKFA  Current Appetite:   [x] Good     [] Fair     [] Poor     [] Other:   Appetite/meal intake prior to admission:   [] Good     [] Fair     [] Poor     [x] Other: unknown   Feeding Limitations:  [] Swallowing difficulty    [] Chewing difficulty    [] Other:  Current Meal Intake:   Patient Vitals for the past 100 hrs:   % Diet Eaten   10/07/17 1748 85 %   10/07/17 1432 95 %     BM: 10/8  Skin Integrity: WDL  Edema: none   Pertinent Medications: Reviewed: 1/2NS at 100 mL/hr, colace     Recent Labs      10/09/17   0325  10/08/17   0430   NA  147*  148*   K  3.4*  3.8   CL  114*  115*   CO2  23  25   GLU  100*  126*   BUN  21*  37*   CREA  1.15  1.27   CA  7.9*  8.2*   ALB  2.6*  2.7*   SGOT  20  23   ALT  19  23       Intake/Output Summary (Last 24 hours) at 10/09/17 1001  Last data filed at 10/09/17 0742   Gross per 24 hour   Intake             1400 ml   Output             1275 ml   Net              125 ml       Anthropometrics:  Ht Readings from Last 1 Encounters:   10/05/17 5' 8\" (1.727 m)     Last 3 Recorded Weights in this Encounter    10/07/17 0400 10/08/17 0400 10/09/17 0400   Weight: 100.1 kg (220 lb 10.9 oz) 100.4 kg (221 lb 5.5 oz) 102.7 kg (226 lb 6.6 oz)     Body mass index is 34.43 kg/(m^2). Obese, Class I     Weight History:   Weight Metrics 10/9/2017 4/25/2017 4/3/2017 4/22/2016 10/21/2015 4/30/2015 2/19/2015   Weight 226 lb 6.6 oz 228 lb 228 lb 244 lb 244 lb 252 lb 252 lb   BMI 34.43 kg/m2 34.16 kg/m2 34.16 kg/m2 37.11 kg/m2 37.11 kg/m2 38.33 kg/m2 38.33 kg/m2        Admitting Diagnosis: Symptomatic anemia  GI bleed  UPPER GI BLEED  Rectal bleed  Symptomatic anemia  Anemia  Pertinent PMHx: CAD, CHF, DM, HTN, HLD    Education Needs:        [x] None identified  [] Identified - Not appropriate at this time  []  Identified and addressed - refer to education log  Learning Limitations:   [x] None identified  [] Identified    Cultural, Confucianist & ethnic food preferences:  [x] None identified    [] Identified and addressed     ESTIMATED NUTRITION NEEDS:     Calories: 4439-1742 kcal (MSJx1.2-1.3) based on  [x] Actual  kg     [] IBW   Protein:  gm (0.8-1 gm/kg) based on  [x] Actual BW      [] IBW   Fluid: 1 mL/kcal     MONITORING & EVALUATION:     Nutrition Goal(s):   1. Po intake of meals will meet >75% of patient estimated nutritional needs within the next 7 days.   Outcome:  [] Met/Ongoing    [x]  Not Met    [] New/Initial Goal     Monitoring:   [x] Diet tolerance   [x] Meal intake   [] Supplement intake   [] GI symptoms/ability to tolerate po diet   [] Respiratory status   [] Plan of care      Previous Recommendations (for follow-up assessments only):     [x]   Implemented       [x]   Not Implemented (RD to address)     [] No Recommendation Made     Discharge Planning: cardiac, diabetic diet as tolerated   [x] Participated in care planning, discharge planning, & interdisciplinary rounds as appropriate      Liseth Vaughn, [address], [address] [place] [address]   Pager: [phone]

## 2017-10-09 NOTE — ROUTINE PROCESS
Bedside and Verbal shift change report given to Linnea Strickland RN (oncoming nurse) by Vrigil Slade. Ravindra DUFFY (offgoing nurse). Report included the following information SBAR, Kardex, Intake/Output, MAR and Recent Results.

## 2017-10-10 VITALS
TEMPERATURE: 97.4 F | RESPIRATION RATE: 18 BRPM | DIASTOLIC BLOOD PRESSURE: 80 MMHG | WEIGHT: 225.1 LBS | SYSTOLIC BLOOD PRESSURE: 161 MMHG | HEART RATE: 101 BPM | OXYGEN SATURATION: 98 % | HEIGHT: 68 IN | BODY MASS INDEX: 34.11 KG/M2

## 2017-10-10 LAB
ALBUMIN SERPL-MCNC: 2.5 G/DL (ref 3.4–5)
ALBUMIN/GLOB SERPL: 1 {RATIO} (ref 0.8–1.7)
ALP SERPL-CCNC: 47 U/L (ref 45–117)
ALT SERPL-CCNC: 16 U/L (ref 16–61)
ANION GAP SERPL CALC-SCNC: 8 MMOL/L (ref 3–18)
AST SERPL-CCNC: 20 U/L (ref 15–37)
BASOPHILS # BLD: 0 K/UL (ref 0–0.1)
BASOPHILS NFR BLD: 0 % (ref 0–2)
BILIRUB SERPL-MCNC: 0.9 MG/DL (ref 0.2–1)
BUN SERPL-MCNC: 17 MG/DL (ref 7–18)
BUN/CREAT SERPL: 18 (ref 12–20)
CALCIUM SERPL-MCNC: 8 MG/DL (ref 8.5–10.1)
CHLORIDE SERPL-SCNC: 112 MMOL/L (ref 100–108)
CO2 SERPL-SCNC: 23 MMOL/L (ref 21–32)
CREAT SERPL-MCNC: 0.92 MG/DL (ref 0.6–1.3)
DIFFERENTIAL METHOD BLD: ABNORMAL
EOSINOPHIL # BLD: 0.3 K/UL (ref 0–0.4)
EOSINOPHIL NFR BLD: 5 % (ref 0–5)
ERYTHROCYTE [DISTWIDTH] IN BLOOD BY AUTOMATED COUNT: 17.6 % (ref 11.6–14.5)
GLOBULIN SER CALC-MCNC: 2.5 G/DL (ref 2–4)
GLUCOSE BLD STRIP.AUTO-MCNC: 126 MG/DL (ref 70–110)
GLUCOSE BLD STRIP.AUTO-MCNC: 134 MG/DL (ref 70–110)
GLUCOSE BLD STRIP.AUTO-MCNC: 184 MG/DL (ref 70–110)
GLUCOSE BLD STRIP.AUTO-MCNC: 197 MG/DL (ref 70–110)
GLUCOSE SERPL-MCNC: 120 MG/DL (ref 74–99)
HCT VFR BLD AUTO: 25.1 % (ref 36–48)
HCT VFR BLD AUTO: 26.3 % (ref 36–48)
HCT VFR BLD AUTO: 27 % (ref 36–48)
HGB BLD-MCNC: 8.1 G/DL (ref 13–16)
HGB BLD-MCNC: 8.7 G/DL (ref 13–16)
HGB BLD-MCNC: 8.8 G/DL (ref 13–16)
LYMPHOCYTES # BLD: 1 K/UL (ref 0.9–3.6)
LYMPHOCYTES NFR BLD: 13 % (ref 21–52)
MCH RBC QN AUTO: 27.7 PG (ref 24–34)
MCHC RBC AUTO-ENTMCNC: 32.3 G/DL (ref 31–37)
MCV RBC AUTO: 86 FL (ref 74–97)
MONOCYTES # BLD: 1 K/UL (ref 0.05–1.2)
MONOCYTES NFR BLD: 12 % (ref 3–10)
NEUTS SEG # BLD: 5.4 K/UL (ref 1.8–8)
NEUTS SEG NFR BLD: 70 % (ref 40–73)
PLATELET # BLD AUTO: 113 K/UL (ref 135–420)
PMV BLD AUTO: 12 FL (ref 9.2–11.8)
POTASSIUM SERPL-SCNC: 3.3 MMOL/L (ref 3.5–5.5)
PROT SERPL-MCNC: 5 G/DL (ref 6.4–8.2)
RBC # BLD AUTO: 2.92 M/UL (ref 4.7–5.5)
SODIUM SERPL-SCNC: 143 MMOL/L (ref 136–145)
WBC # BLD AUTO: 7.6 K/UL (ref 4.6–13.2)

## 2017-10-10 PROCEDURE — 85025 COMPLETE CBC W/AUTO DIFF WBC: CPT | Performed by: PHYSICIAN ASSISTANT

## 2017-10-10 PROCEDURE — 36415 COLL VENOUS BLD VENIPUNCTURE: CPT | Performed by: PHYSICIAN ASSISTANT

## 2017-10-10 PROCEDURE — 74011250637 HC RX REV CODE- 250/637: Performed by: HOSPITALIST

## 2017-10-10 PROCEDURE — 74011250637 HC RX REV CODE- 250/637: Performed by: INTERNAL MEDICINE

## 2017-10-10 PROCEDURE — 80053 COMPREHEN METABOLIC PANEL: CPT | Performed by: PHYSICIAN ASSISTANT

## 2017-10-10 PROCEDURE — 74011250636 HC RX REV CODE- 250/636: Performed by: FAMILY MEDICINE

## 2017-10-10 PROCEDURE — 85018 HEMOGLOBIN: CPT | Performed by: INTERNAL MEDICINE

## 2017-10-10 PROCEDURE — 74011000250 HC RX REV CODE- 250: Performed by: INTERNAL MEDICINE

## 2017-10-10 PROCEDURE — 90471 IMMUNIZATION ADMIN: CPT

## 2017-10-10 PROCEDURE — 90686 IIV4 VACC NO PRSV 0.5 ML IM: CPT | Performed by: FAMILY MEDICINE

## 2017-10-10 PROCEDURE — 74011636637 HC RX REV CODE- 636/637: Performed by: INTERNAL MEDICINE

## 2017-10-10 PROCEDURE — 82962 GLUCOSE BLOOD TEST: CPT

## 2017-10-10 PROCEDURE — 74011250636 HC RX REV CODE- 250/636: Performed by: INTERNAL MEDICINE

## 2017-10-10 PROCEDURE — C9113 INJ PANTOPRAZOLE SODIUM, VIA: HCPCS | Performed by: INTERNAL MEDICINE

## 2017-10-10 RX ORDER — AMOXICILLIN 500 MG/1
1000 CAPSULE ORAL EVERY 12 HOURS
Qty: 48 CAP | Refills: 0 | Status: SHIPPED | OUTPATIENT
Start: 2017-10-10 | End: 2017-10-21

## 2017-10-10 RX ORDER — PANTOPRAZOLE SODIUM 40 MG/1
40 TABLET, DELAYED RELEASE ORAL 2 TIMES DAILY
Qty: 60 TAB | Refills: 1 | Status: SHIPPED | OUTPATIENT
Start: 2017-10-10

## 2017-10-10 RX ORDER — CLARITHROMYCIN 500 MG/1
500 TABLET, FILM COATED ORAL 2 TIMES DAILY
Qty: 24 TAB | Refills: 0 | Status: SHIPPED | OUTPATIENT
Start: 2017-10-10 | End: 2017-10-21

## 2017-10-10 RX ORDER — POTASSIUM CHLORIDE 20 MEQ/1
40 TABLET, EXTENDED RELEASE ORAL
Status: COMPLETED | OUTPATIENT
Start: 2017-10-10 | End: 2017-10-10

## 2017-10-10 RX ADMIN — SODIUM CHLORIDE 40 MG: 9 INJECTION INTRAMUSCULAR; INTRAVENOUS; SUBCUTANEOUS at 10:46

## 2017-10-10 RX ADMIN — INFLUENZA VIRUS VACCINE 0.5 ML: 15; 15; 15; 15 SUSPENSION INTRAMUSCULAR at 16:47

## 2017-10-10 RX ADMIN — ALLOPURINOL 300 MG: 300 TABLET ORAL at 10:46

## 2017-10-10 RX ADMIN — EZETIMIBE 10 MG: 10 TABLET ORAL at 10:47

## 2017-10-10 RX ADMIN — CLARITHROMYCIN 500 MG: 500 TABLET ORAL at 10:46

## 2017-10-10 RX ADMIN — FINASTERIDE 5 MG: 5 TABLET, FILM COATED ORAL at 10:47

## 2017-10-10 RX ADMIN — INSULIN LISPRO 3 UNITS: 100 INJECTION, SOLUTION INTRAVENOUS; SUBCUTANEOUS at 12:45

## 2017-10-10 RX ADMIN — POTASSIUM CHLORIDE 40 MEQ: 20 TABLET, EXTENDED RELEASE ORAL at 16:59

## 2017-10-10 NOTE — PROGRESS NOTES
Patient discharge education given to patient, family at bedside, prescriptions given to patient. All questions answered. IV catheters removed to the right AC and left AC, no signs of pain, edema, bleeding, infiltration to the sites, tip intact. All questions answered.

## 2017-10-10 NOTE — CDMP QUERY
Attending is documenting \"chronic systolic cardiomyopathy compensated\". Please clarify type of cardiomyopathy:    =>Congestive cardiomyopathy  =>Dilated cardiomyopathy  =>Ischemic cardiomyopathy  =>Other cardiomyopathy (spell out)  =>Other Explanation of clinical findings  =>Unable to Determine (no explanation of clinical findings)    The medical record reflects the following:  Risk:  --PMH CHF, cardiomyopathy    Clinical Indicators:  --10/8/2017 04:30: NT pro-BNP: 9662 (H)    Treatment:   --has AICD implanted    Please clarify and document your clinical opinion in the progress notes and discharge summary including the definitive and/or presumptive diagnosis, (suspected or probable), related to the above clinical findings. Please include clinical findings supporting your diagnosis. If you DECLINE this query or would like to communicate with [employer], please utilize the \"[employer] message box\" at the TOP of the Progress Note on the right.       Thank you,  Krystyna Quintanilla [facility] [address]

## 2017-10-10 NOTE — PROGRESS NOTES
OT orders received and chart reviewed. Pt not seen for skilled OT due to:  []  Nausea/vomiting  []  Eating  []  Pain  []  Pt lethargic  [x]  MD present in room  Trial 2: patient reported he is able to ambulate and perform all self care tasks independently. Respectfully declined OT evaluation. Will discontinue OT orders. Thank you.   Abebe Gonsalves OTR/MARY

## 2017-10-10 NOTE — PROGRESS NOTES
Chart reviewed. No further GI workup at this time. Cont PPI BID for 2 months then once daily. Will F/U OP. Call for questions

## 2017-10-10 NOTE — PROGRESS NOTES
1815-TRANSFER - IN REPORT:    Verbal report received from (name) on Mayra Ajay  being received from ICU(unit) for routine progression of care      Report consisted of patients Situation, Background, Assessment and   Recommendations(SBAR). Information from the following report(s) SBAR, Kardex, ED Summary and MAR was reviewed with the receiving nurse. Opportunity for questions and clarification was provided. Assessment completed upon patients arrival to unit and care assumed.

## 2017-10-10 NOTE — CDMP QUERY
Pulmonary documented \"PETE on CKD\". H&P stated \" nephropathy stage 3A\". Please clarify if this patient is being treated/managed for:    =>PETE on CKD 3  =>Other Explanation of clinical findings  =>Unable to Determine (no explanation of clinical findings)    The medical record reflects the following:  Risk:  --PMH HTN, CHF, diabetic nephropathy    Clinical Indicators:  --10/4/2017 14:35: BUN: 86 (H), Creatinine: 1.56 (H), BUN/Creatinine ratio: 55 (H), GFR est non-AA: 44 (L), GFR est AA: 53 (L)  --10/5/2017 11:32: BUN: 107 (H), Creatinine: 1.92 (H), BUN/Creatinine ratio: 56 (H), GFR est non-AA: 34 (L), GFR est AA: 42 (L)  --10/6/2017 04:40: BUN: 99 (H), Creatinine: 1.88 (H), BUN/Creatinine ratio: 53 (H), GFR est non-AA: 35 (L), GFR est AA: 43 (L)  --10/8/2017 04:30: BUN: 37 (H), Creatinine: 1.27, BUN/Creatinine ratio: 29 (H), GFR est non-AA: 55 (L), GFR est AA: >60  --10/9/2017 03:25: BUN: 21 (H), Creatinine: 1.15, BUN/Creatinine ratio: 18, GFR est non-AA: >60, GFR est AA: >60  --10/10/2017 04:48: BUN: 17, Creatinine: 0.92, BUN/Creatinine ratio: 18, GFR est non-AA: >60, GFR est AA: >60    Treatment:   --recieving serial BMP  --given IVF    Please clarify and document your clinical opinion in the progress notes and discharge summary including the definitive and/or presumptive diagnosis, (suspected or probable), related to the above clinical findings. Please include clinical findings supporting your diagnosis. If you DECLINE this query or would like to communicate with [facility], please utilize the \"[employer] message box\" at the TOP of the Progress Note on the right.       Thank you,  Sabino [facility] [address]

## 2017-10-10 NOTE — DISCHARGE SUMMARY
[address] [place] Noel SUMMARY    Name:  Maaem Lopez  MR#:  [id number]  :  1945  Account #:  [de-identified]  Date of Adm:  10/04/2017  Date of Discharge:  10/10/2017      CONSULTATIONS  1. Dr. Rachna Godoy from gastrointestinal and liver specialists service. 2. Dr. Masood Rizzo, pulmonary disease specialist.    PROCEDURES DONE  1. Upper endoscopy on 10/05/2017 showed clean-based, shallow  gastric ulcer, unlikely to be source of recurrent bleeding. 2. He had a repeat upper endoscopy on 10/08/2017 that showed  Dieulafoy lesion of the upper gastric body lesser curve with visible  vessel status post injection with 0.8 mL alcohol, two shallow clean-  based gastric body ulcers without stigmata, prominent nodular type  gastritis status post biopsy with recommendations to continue b.i.d.  PPI. STUDIES OF SIGNIFICANCE: He had a bleeding scan done on  10/07/2017 that did not show evidence of active gastrointestinal  bleeding. DISCHARGE DIAGNOSES  1. Upper gastrointestinal bleeding, likely source Dieulafoy lesion, also  noted to have multiple clean-based ulcers as well. 2. Acute blood loss anemia. 3. Dizziness secondary to above-mentioned diagnoses. 4. Hemorrhagic shock, required pressor treatment initially, now  resolved. 5. Hypernatremia, resolved. SECONDARY DIAGNOSES  1. Chronic atrial fibrillation, was on Coumadin. This has been held. 2. Type 2 diabetes mellitus with nephropathy. REASON FOR HOSPITALIZATION: Please see the history and  physical done by Dr. Desire Fabian, the patient is a 49-year-old male with  history of EGD prior to his admission done just prior it seems like on   he had what looks like an upper endoscopy at [facility],  status post biopsy, which eventually came back positive for H pylori. He presented with complaints of rectal bleeding and his hemoglobin  was 5.7 upon initial evaluation. He was heme-positive in the ER.  He  was on Coumadin for chronic atrial fibrillation. He was admitted to the  ICU, was placed on pressors initially secondary to hypotension. His  Coumadin was held and he was given fresh frozen plasma to reverse  his Coumadin. He was also given multiple units of blood transfusions. He was seen by gastroenterology service with procedures done as  described above. He did have finding of Dieulafoy lesion status post  alcohol ingestion. He remained stable after this last procedure with  stable hemoglobin and hematocrit. His case was discussed with  gastroenterologist. They recommend holding Coumadin for the next 2  weeks and if he needs anticoagulation to give him aspirin plus the  proton pump inhibitor. He was also started on antibiotic therapy for H  pylori treatment, given his positive studies from [facility] biopsy. He is on clarithromycin-based therapy. Overall recommendation is for the patient to hold Coumadin for the  next 2 weeks. He can take aspirin daily per GI recommendations, as  well as the clarithromycin-based therapy and the proton pump inhibitor. On date of discharge he is afebrile with stable vital signs. He has no  complaints. PHYSICAL EXAMINATION  GENERAL: He is alert, awake, oriented. HEART: Regular rate and rhythm. LUNGS: Clear. ABDOMEN: Soft, no peripheral edema. HOSPITAL COURSE: His last hemoglobin done today is 8.8, it was 8.1  yesterday. The patient was also noted to be thrombocytopenic, cause  is unclear. This was first noted on 10/05/2015 where his platelets  dropped from 148,000 to 113,000. His last count today is 113,000. This  should be followed up in the outpatient setting. DISCHARGE MEDICATIONS  He can continue these medications:  1. Allopurinol 300 mg daily. 2. Zetia 10 mg daily. New medications are:  1. Protonix 40 mg twice a day. I believe he used to take it once a day  according to his medical chart prior to his admission. This has been  increased to twice a day.     I see that he is on 2 beta blockers. I am not sure which one of these he  is on. He is also on a number of other medications and at this point it is  unclear which ones he is on. His sister is to bring a list of medications  and final recommendations will be made after I look through the list.  This is therefore a preliminary dictation regarding his medication list.    He is on statins and this interacts with his clarithromycin-based  therapy, so therefore he will be instructed not to take his statin for as  long as he is on the clarithromycin therapy which is for the next 14  weeks, same with digoxin because of drug-drug interaction, I am  instructing him to hold the digoxin until he is finished with his 14-day  course of antibiotics. INSTRUCTIONS  1. Follow up with primary care physician within 7 days. 2. He will need a hemoglobin and hematocrit check within a week to  ensure stability of his hemoglobin. He will also need check of his  platelets within the next 1 week also to ensure resolution and/or  instability of his platelet count. TIME TAKEN FOR DISCHARGE: Greater than 30 minutes.         MD Eddie Contreras  D:  10/10/2017   12:28  T:  10/10/2017   13:03  Job #:  [id number]

## 2017-10-10 NOTE — CDMP QUERY
\"CHF\" is listed in [address]. Attending is documenting \"chronic systolic cardiomyopathy compensated\". Please clarify if this patient is being treated/managed for:    =>Chronic systolic CHF  =>Other Explanation of clinical findings  =>Unable to Determine (no explanation of clinical findings)    The medical record reflects the following:  Risk:  --PMH CHF, cardiomyopathy    Clinical Indicators:  --10/8/2017 04:30: NT pro-BNP: 9662 (H)    Treatment:   --has AICD implanted    Please clarify and document your clinical opinion in the progress notes and discharge summary including the definitive and/or presumptive diagnosis, (suspected or probable), related to the above clinical findings. Please include clinical findings supporting your diagnosis. If you DECLINE this query or would like to communicate with [facility], please utilize the \"[employer] message box\" at the TOP of the Progress Note on the right.       Thank you,  Rachael Bush [address] [facility], [address] [place] [address]

## 2017-10-10 NOTE — ROUTINE PROCESS
1930-Bedside shift change report given to [address] (oncoming nurse) by Laine Jimenez RN (offgoing nurse). Report included the following information SBAR, Kardex, ED Summary, [facility] and Recent Results.

## 2017-10-10 NOTE — DISCHARGE INSTRUCTIONS
Hold zocor and digoxin because of drug interactions with the antibiotic regimen you are on for treatment of your stomach infection with the bacteria H.pylori. You may resume these medications once you are done with your antibiotic regimen. Please follow up with your doctor in 7 days to check your hemoglobin and platelets both of which are low but improving. Return to the ED if you see blood in your stool or black stools. Do not take your coumadin for the next 2 weeks as it may increase your chances of having another gastrointestinal bleeding event, until then take aspirin daily. You may need to get back on coumadin to avoid getting a stroke and you will need to discuss that with either your heart doctor or your primary care doctor. Gastrointestinal Bleeding: Care Instructions  Your Care Instructions    The digestive or gastrointestinal tract goes from the mouth to the anus. It is often called the GI tract. Bleeding can happen anywhere in the GI tract. It may be caused by an ulcer, an infection, or cancer. It may also be caused by medicines such as aspirin or ibuprofen. Light bleeding may not cause any symptoms at first. But if you continue to bleed for a while, you may feel very weak or tired. Sudden, heavy bleeding means you need to see a doctor right away. This kind of bleeding can be very dangerous. But it can usually be cured or controlled. The doctor may do some tests to find the cause of your bleeding. Follow-up care is a key part of your treatment and safety. Be sure to make and go to all appointments, and call your doctor if you are having problems. It's also a good idea to know your test results and keep a list of the medicines you take. How can you care for yourself at home? · Be safe with medicines. Take your medicines exactly as prescribed. Call your doctor if you think you are having a problem with your medicine.  You will get more details on the specific medicines your doctor prescribes. · Do not take aspirin or other anti-inflammatory medicines, such as naproxen (Aleve) or ibuprofen (Advil, Motrin), without talking to your doctor first. Ask your doctor if it is okay to use acetaminophen (Tylenol). · Do not drink alcohol. · The bleeding may make you lose iron. So it's important to eat foods that have a lot of iron. These include red meat, shellfish, poultry, and eggs. They also include beans, raisins, whole-grain breads, and leafy green vegetables. If you want help planning meals, you can make an appointment with a dietitian. When should you call for help? Call 911 anytime you think you may need emergency care. For example, call if:  · You have sudden, severe belly pain. · You vomit blood or what looks like coffee grounds. · You passed out (lost consciousness). · Your stools are maroon or very bloody. Call your doctor now or seek immediate medical care if:  · You are dizzy or lightheaded, or you feel like you may faint. · Your stools are black and look like tar, or they have streaks of blood. · You have belly pain. · You vomit or have nausea. · You have trouble swallowing, or it hurts when you swallow. Watch closely for changes in your health, and be sure to contact your doctor if:  · You do not get better as expected. Where can you learn more? Go to http://yaw-junie.info/. Enter H617 in the search box to learn more about \"Gastrointestinal Bleeding: Care Instructions. \"  Current as of: March 20, 2017  Content Version: 11.3  © 8406-1565 [employer]. Care instructions adapted under license by [employer] (which disclaims liability or warranty for this information). If you have questions about a medical condition or this instruction, always ask your healthcare professional. [place] [address] any warranty or liability for your use of this information.          Anemia: Care Instructions  Your Care Instructions    Anemia is a low level of red blood cells, which carry oxygen throughout your body. Many things can cause anemia. Lack of iron is one of the most common causes. Your body needs iron to make hemoglobin, a substance in red blood cells that carries oxygen from the lungs to your body's cells. Without enough iron, the body produces fewer and smaller red blood cells. As a result, your body's cells do not get enough oxygen, and you feel tired and weak. And you may have trouble concentrating. Bleeding is the most common cause of a lack of iron. You may have heavy menstrual bleeding or bleeding caused by conditions such as ulcers, hemorrhoids, or cancer. Regular use of aspirin or other anti-inflammatory medicines (such as ibuprofen) also can cause bleeding in some people. A lack of iron in your diet also can cause anemia, especially at times when the body needs more iron, such as during pregnancy, infancy, and the teen years. Your doctor may have prescribed iron pills. It may take several months of treatment for your iron levels to return to normal. Your doctor also may suggest that you eat foods that are rich in iron, such as meat and beans. There are many other causes of anemia. It is not always due to a lack of iron. Finding the specific cause of your anemia will help your doctor find the right treatment for you. Follow-up care is a key part of your treatment and safety. Be sure to make and go to all appointments, and call your doctor if you are having problems. It's also a good idea to know your test results and keep a list of the medicines you take. How can you care for yourself at home? · Take your medicines exactly as prescribed. Call your doctor if you think you are having a problem with your medicine. · If your doctor recommends iron pills, take them as directed:  ¨ Try to take the pills on an empty stomach about 1 hour before or 2 hours after meals.  But you may need to take iron with food to avoid an upset stomach. ¨ Do not take antacids or drink milk or caffeine drinks (such as coffee, tea, or cola) at the same time or within 2 hours of the time that you take your iron. They can make it hard for your body to absorb the iron. ¨ Vitamin C (from food or supplements) helps your body absorb iron. Try taking iron pills with a glass of orange juice or some other food that is high in vitamin C, such as citrus fruits. ¨ Iron pills may cause stomach problems, such as heartburn, nausea, diarrhea, constipation, and cramps. Be sure to drink plenty of fluids, and include fruits, vegetables, and fiber in your diet each day. Iron pills often make your bowel movements dark or green. ¨ If you forget to take an iron pill, do not take a double dose of iron the next time you take a pill. ¨ Keep iron pills out of the reach of small children. An overdose of iron can be very dangerous. · Follow your doctor's advice about eating iron-rich foods. These include red meat, shellfish, poultry, eggs, beans, raisins, whole-grain bread, and leafy green vegetables. · Steam vegetables to help them keep their iron content. When should you call for help? Call 911 anytime you think you may need emergency care. For example, call if:  · You have symptoms of a heart attack. These may include:  ¨ Chest pain or pressure, or a strange feeling in the chest.  ¨ Sweating. ¨ Shortness of breath. ¨ Nausea or vomiting. ¨ Pain, pressure, or a strange feeling in the back, neck, jaw, or upper belly or in one or both shoulders or arms. ¨ Lightheadedness or sudden weakness. ¨ A fast or irregular heartbeat. After you call 911, the  may tell you to chew 1 adult-strength or 2 to 4 low-dose aspirin. Wait for an ambulance. Do not try to drive yourself. · You passed out (lost consciousness). Call your doctor now or seek immediate medical care if:  · You have new or increased shortness of breath.   · You are dizzy or lightheaded, or you feel like you may faint. · Your fatigue and weakness continue or get worse. · You have any abnormal bleeding, such as:  ¨ Nosebleeds. ¨ Vaginal bleeding that is different (heavier, more frequent, at a different time of the month) than what you are used to. ¨ Bloody or black stools, or rectal bleeding. ¨ Bloody or pink urine. Watch closely for changes in your health, and be sure to contact your doctor if:  · You do not get better as expected. Where can you learn more? Go to http://yaw-junie.info/. Enter R301 in the search box to learn more about \"Anemia: Care Instructions. \"  Current as of: 2016  Content Version: 11.3  © 1809-8288 [employer]. Care instructions adapted under license by [employer] (which disclaims liability or warranty for this information). If you have questions about a medical condition or this instruction, always ask your healthcare professional. [place] [address] any warranty or liability for your use of this information. Patient armband removed and shredded    MyChart Activation    Thank you for requesting access to ipsy. Please follow the instructions below to securely access and download your online medical record. ipsy allows you to send messages to your doctor, view your test results, renew your prescriptions, schedule appointments, and more. How Do I Sign Up? 1. In your internet browser, go to [email or web address]  2. Click on the First Time User? Click Here link in the Sign In box. You will be redirect to the New Member Sign Up page. 3. Enter your ipsy Access Code exactly as it appears below. You will not need to use this code after youve completed the sign-up process. If you do not sign up before the expiration date, you must request a new code. ipsy Access Code: [email or web address]  Expires: 2018  4:21 PM (This is the date your ipsy access code will )    4.  Enter the last four digits of your Social Security Number (xxxx) and Date of Birth (mm/dd/yyyy) as indicated and click Submit. You will be taken to the next sign-up page. 5. Create a [employer] ID. This will be your [employer] login ID and cannot be changed, so think of one that is secure and easy to remember. 6. Create a [employer] password. You can change your password at any time. 7. Enter your Password Reset Question and Answer. This can be used at a later time if you forget your password. 8. Enter your e-mail address. You will receive e-mail notification when new information is available in [address] 9. Click Sign Up. You can now view and download portions of your medical record. 10. Click the Download Summary menu link to download a portable copy of your medical information. Additional Information    If you have questions, please visit the Frequently Asked Questions section of the [employer] website at [email or web address] Remember, [employer] is NOT to be used for urgent needs. For medical emergencies, dial 911. DISCHARGE SUMMARY from Nurse    The following personal items are in your possession at time of discharge:    Dental Appliances: None  Visual Aid: None                            PATIENT INSTRUCTIONS:    After general anesthesia or intravenous sedation, for 24 hours or while taking prescription Narcotics:  · Limit your activities  · Do not drive and operate hazardous machinery  · Do not make important personal or business decisions  · Do  not drink alcoholic beverages  · If you have not urinated within 8 hours after discharge, please contact your surgeon on call.     Report the following to your surgeon:  · Excessive pain, swelling, redness or odor of or around the surgical area  · Temperature over 100.5  · Nausea and vomiting lasting longer than 4 hours or if unable to take medications  · Any signs of decreased circulation or nerve impairment to extremity: change in color, persistent numbness, tingling, coldness or increase pain  · Any questions        What to do at Home:  Recommended activity: Activity as tolerated    If you experience any of the following symptoms Nausea, vomiting, diarrhea, fever greater than 100.5, dizziness, severe headache, shortness of breath, chest pain, increased pain, please follow up with PCP. *  Please give a list of your current medications to your Primary Care Provider. *  Please update this list whenever your medications are discontinued, doses are      changed, or new medications (including over-the-counter products) are added. *  Please carry medication information at all times in case of emergency situations. These are general instructions for a healthy lifestyle:    No smoking/ No tobacco products/ Avoid exposure to second hand smoke    Surgeon General's Warning:  Quitting smoking now greatly reduces serious risk to your health. Obesity, smoking, and sedentary lifestyle greatly increases your risk for illness    A healthy diet, regular physical exercise & weight monitoring are important for maintaining a healthy lifestyle    You may be retaining fluid if you have a history of heart failure or if you experience any of the following symptoms:  Weight gain of 3 pounds or more overnight or 5 pounds in a week, increased swelling in our hands or feet or shortness of breath while lying flat in bed. Please call your doctor as soon as you notice any of these symptoms; do not wait until your next office visit. Recognize signs and symptoms of STROKE:    F-face looks uneven    A-arms unable to move or move unevenly    S-speech slurred or non-existent    T-time-call 911 as soon as signs and symptoms begin-DO NOT go       Back to bed or wait to see if you get better-TIME IS BRAIN. Warning Signs of HEART ATTACK     Call 911 if you have these symptoms:   Chest discomfort.  Most heart attacks involve discomfort in the center of the chest that lasts more than a few minutes, or that goes away and comes back. It can feel like uncomfortable pressure, squeezing, fullness, or pain.  Discomfort in other areas of the upper body. Symptoms can include pain or discomfort in one or both arms, the back, neck, jaw, or stomach.  Shortness of breath with or without chest discomfort.  Other signs may include breaking out in a cold sweat, nausea, or lightheadedness. Don't wait more than five minutes to call 911 - MINUTES MATTER! Fast action can save your life. Calling 911 is almost always the fastest way to get lifesaving treatment. Emergency Medical Services staff can begin treatment when they arrive -- up to an hour sooner than if someone gets to the hospital by car. The discharge information has been reviewed with the patient. The patient verbalized understanding. Discharge medications reviewed with the patient and appropriate educational materials and side effects teaching were provided.

## 2017-10-10 NOTE — PROGRESS NOTES
Problem: Falls - Risk of  Goal: *Absence of Falls  Document Tiana Fall Risk and appropriate interventions in the flowsheet.    Outcome: Progressing Towards Goal  Fall Risk Interventions:  Mobility Interventions: Assess mobility with egress test           Medication Interventions: Assess postural VS orthostatic hypotension     Elimination Interventions: Call light in reach

## 2017-10-10 NOTE — PROGRESS NOTES
PT eval order received and chart reviewed. Patient in room with nursing for routine care. Will follow up as patient schedule allows. Thank you for this referral. Michel Pyle, PT, DPT.

## 2017-10-10 NOTE — PROGRESS NOTES
PT eval order received and chart reviewed. Patient reports he has been up walking in room and feels he is close to his baseline. Patient states he doesn't feel he needs physical therapy at this time and was educated that PT will discontinue eval order. Patient aware that if he changes his mind and wants therapy, he may have MD re-order. Thank you for this referral. Javed Donnelly, PT, DPT.

## 2017-10-20 ENCOUNTER — APPOINTMENT (OUTPATIENT)
Dept: CT IMAGING | Age: 72
DRG: 065 | End: 2017-10-20
Attending: PSYCHIATRY & NEUROLOGY
Payer: MEDICARE

## 2017-10-20 ENCOUNTER — HOSPITAL ENCOUNTER (INPATIENT)
Age: 72
LOS: 1 days | Discharge: HOME | DRG: 065 | End: 2017-10-21
Attending: EMERGENCY MEDICINE | Admitting: HOSPITALIST
Payer: MEDICARE

## 2017-10-20 ENCOUNTER — APPOINTMENT (OUTPATIENT)
Dept: CT IMAGING | Age: 72
DRG: 065 | End: 2017-10-20
Attending: EMERGENCY MEDICINE
Payer: MEDICARE

## 2017-10-20 DIAGNOSIS — R47.01 APHASIA: Primary | ICD-10-CM

## 2017-10-20 LAB
ANION GAP SERPL CALC-SCNC: 8 MMOL/L (ref 3–18)
APTT PPP: 32 SEC (ref 23–36.4)
ATRIAL RATE: 416 BPM
BASOPHILS # BLD: 0.1 K/UL (ref 0–0.06)
BASOPHILS NFR BLD: 1 % (ref 0–2)
BUN SERPL-MCNC: 31 MG/DL (ref 7–18)
BUN/CREAT SERPL: 19 (ref 12–20)
CALCIUM SERPL-MCNC: 8.9 MG/DL (ref 8.5–10.1)
CALCULATED R AXIS, ECG10: 74 DEGREES
CALCULATED T AXIS, ECG11: 62 DEGREES
CHLORIDE SERPL-SCNC: 101 MMOL/L (ref 100–108)
CK MB CFR SERPL CALC: NORMAL % (ref 0–4)
CK MB SERPL-MCNC: <1 NG/ML (ref 5–25)
CK SERPL-CCNC: 160 U/L (ref 39–308)
CLOSURE TME COLL+ADP BLD: 130 SEC (ref 52–125)
CLOSURE TME COLL+EPINEP BLD: 194 SEC (ref 70–180)
CO2 SERPL-SCNC: 31 MMOL/L (ref 21–32)
CREAT SERPL-MCNC: 1.67 MG/DL (ref 0.6–1.3)
DIAGNOSIS, 93000: NORMAL
DIFFERENTIAL METHOD BLD: ABNORMAL
EOSINOPHIL # BLD: 0.1 K/UL (ref 0–0.4)
EOSINOPHIL NFR BLD: 2 % (ref 0–5)
ERYTHROCYTE [DISTWIDTH] IN BLOOD BY AUTOMATED COUNT: 16.8 % (ref 11.6–14.5)
EST. AVERAGE GLUCOSE BLD GHB EST-MCNC: 123 MG/DL
GLUCOSE BLD STRIP.AUTO-MCNC: 105 MG/DL (ref 70–110)
GLUCOSE BLD STRIP.AUTO-MCNC: 106 MG/DL (ref 70–110)
GLUCOSE BLD STRIP.AUTO-MCNC: 138 MG/DL (ref 70–110)
GLUCOSE BLD STRIP.AUTO-MCNC: 193 MG/DL (ref 70–110)
GLUCOSE BLD STRIP.AUTO-MCNC: 56 MG/DL (ref 70–110)
GLUCOSE BLD STRIP.AUTO-MCNC: 82 MG/DL (ref 70–110)
GLUCOSE BLD STRIP.AUTO-MCNC: 88 MG/DL (ref 70–110)
GLUCOSE BLD STRIP.AUTO-MCNC: 99 MG/DL (ref 70–110)
GLUCOSE SERPL-MCNC: 55 MG/DL (ref 74–99)
HBA1C MFR BLD: 5.9 % (ref 4.2–5.6)
HCT VFR BLD AUTO: 24.7 % (ref 36–48)
HGB BLD-MCNC: 7.8 G/DL (ref 13–16)
INR PPP: 1 (ref 0.8–1.2)
LYMPHOCYTES # BLD: 1.3 K/UL (ref 0.9–3.6)
LYMPHOCYTES NFR BLD: 22 % (ref 21–52)
MCH RBC QN AUTO: 27.2 PG (ref 24–34)
MCHC RBC AUTO-ENTMCNC: 31.6 G/DL (ref 31–37)
MCV RBC AUTO: 86.1 FL (ref 74–97)
MONOCYTES # BLD: 0.5 K/UL (ref 0.05–1.2)
MONOCYTES NFR BLD: 9 % (ref 3–10)
NEUTS SEG # BLD: 3.7 K/UL (ref 1.8–8)
NEUTS SEG NFR BLD: 66 % (ref 40–73)
PLATELET # BLD AUTO: 223 K/UL (ref 135–420)
PMV BLD AUTO: 11.3 FL (ref 9.2–11.8)
POTASSIUM SERPL-SCNC: 3.6 MMOL/L (ref 3.5–5.5)
PROTHROMBIN TIME: 13 SEC (ref 11.5–15.2)
Q-T INTERVAL, ECG07: 434 MS
QRS DURATION, ECG06: 158 MS
QTC CALCULATION (BEZET), ECG08: 522 MS
RBC # BLD AUTO: 2.87 M/UL (ref 4.7–5.5)
RBC MORPH BLD: ABNORMAL
RBC MORPH BLD: ABNORMAL
SODIUM SERPL-SCNC: 140 MMOL/L (ref 136–145)
TROPONIN I SERPL-MCNC: 0.02 NG/ML (ref 0–0.06)
VENTRICULAR RATE, ECG03: 87 BPM
WBC # BLD AUTO: 5.7 K/UL (ref 4.6–13.2)

## 2017-10-20 PROCEDURE — 96374 THER/PROPH/DIAG INJ IV PUSH: CPT

## 2017-10-20 PROCEDURE — 97161 PT EVAL LOW COMPLEX 20 MIN: CPT

## 2017-10-20 PROCEDURE — 92610 EVALUATE SWALLOWING FUNCTION: CPT

## 2017-10-20 PROCEDURE — 65660000000 HC RM CCU STEPDOWN

## 2017-10-20 PROCEDURE — 74011250636 HC RX REV CODE- 250/636: Performed by: EMERGENCY MEDICINE

## 2017-10-20 PROCEDURE — 74011250637 HC RX REV CODE- 250/637: Performed by: FAMILY MEDICINE

## 2017-10-20 PROCEDURE — 85025 COMPLETE CBC W/AUTO DIFF WBC: CPT | Performed by: EMERGENCY MEDICINE

## 2017-10-20 PROCEDURE — 85576 BLOOD PLATELET AGGREGATION: CPT | Performed by: EMERGENCY MEDICINE

## 2017-10-20 PROCEDURE — 70450 CT HEAD/BRAIN W/O DYE: CPT

## 2017-10-20 PROCEDURE — 93005 ELECTROCARDIOGRAM TRACING: CPT

## 2017-10-20 PROCEDURE — 74011250637 HC RX REV CODE- 250/637: Performed by: HOSPITALIST

## 2017-10-20 PROCEDURE — 70496 CT ANGIOGRAPHY HEAD: CPT

## 2017-10-20 PROCEDURE — 82550 ASSAY OF CK (CPK): CPT | Performed by: EMERGENCY MEDICINE

## 2017-10-20 PROCEDURE — 74011636320 HC RX REV CODE- 636/320: Performed by: EMERGENCY MEDICINE

## 2017-10-20 PROCEDURE — 85610 PROTHROMBIN TIME: CPT | Performed by: EMERGENCY MEDICINE

## 2017-10-20 PROCEDURE — 74011636637 HC RX REV CODE- 636/637: Performed by: FAMILY MEDICINE

## 2017-10-20 PROCEDURE — 82962 GLUCOSE BLOOD TEST: CPT

## 2017-10-20 PROCEDURE — 99285 EMERGENCY DEPT VISIT HI MDM: CPT

## 2017-10-20 PROCEDURE — 85730 THROMBOPLASTIN TIME PARTIAL: CPT | Performed by: EMERGENCY MEDICINE

## 2017-10-20 PROCEDURE — 74011250637 HC RX REV CODE- 250/637: Performed by: EMERGENCY MEDICINE

## 2017-10-20 PROCEDURE — 36415 COLL VENOUS BLD VENIPUNCTURE: CPT | Performed by: FAMILY MEDICINE

## 2017-10-20 PROCEDURE — 80048 BASIC METABOLIC PNL TOTAL CA: CPT | Performed by: EMERGENCY MEDICINE

## 2017-10-20 PROCEDURE — 83036 HEMOGLOBIN GLYCOSYLATED A1C: CPT | Performed by: FAMILY MEDICINE

## 2017-10-20 PROCEDURE — 96361 HYDRATE IV INFUSION ADD-ON: CPT

## 2017-10-20 PROCEDURE — 74011000250 HC RX REV CODE- 250: Performed by: EMERGENCY MEDICINE

## 2017-10-20 RX ORDER — THERA TABS 400 MCG
1 TAB ORAL DAILY
Status: DISCONTINUED | OUTPATIENT
Start: 2017-10-21 | End: 2017-10-21 | Stop reason: HOSPADM

## 2017-10-20 RX ORDER — DEXTROSE 50 % IN WATER (D50W) INTRAVENOUS SYRINGE
25-50 AS NEEDED
Status: DISCONTINUED | OUTPATIENT
Start: 2017-10-20 | End: 2017-10-21 | Stop reason: HOSPADM

## 2017-10-20 RX ORDER — DEXTROSE 50 % IN WATER (D50W) INTRAVENOUS SYRINGE
25
Status: COMPLETED | OUTPATIENT
Start: 2017-10-20 | End: 2017-10-20

## 2017-10-20 RX ORDER — LABETALOL HYDROCHLORIDE 5 MG/ML
5 INJECTION, SOLUTION INTRAVENOUS
Status: DISCONTINUED | OUTPATIENT
Start: 2017-10-20 | End: 2017-10-21 | Stop reason: HOSPADM

## 2017-10-20 RX ORDER — DIGOXIN 250 MCG
0.25 TABLET ORAL DAILY
Status: DISCONTINUED | OUTPATIENT
Start: 2017-10-21 | End: 2017-10-21 | Stop reason: HOSPADM

## 2017-10-20 RX ORDER — MAGNESIUM SULFATE 100 %
4 CRYSTALS MISCELLANEOUS AS NEEDED
Status: DISCONTINUED | OUTPATIENT
Start: 2017-10-20 | End: 2017-10-21 | Stop reason: HOSPADM

## 2017-10-20 RX ORDER — GUAIFENESIN 100 MG/5ML
81 LIQUID (ML) ORAL
Status: COMPLETED | OUTPATIENT
Start: 2017-10-20 | End: 2017-10-20

## 2017-10-20 RX ORDER — ATORVASTATIN CALCIUM 40 MG/1
40 TABLET, FILM COATED ORAL
Status: DISCONTINUED | OUTPATIENT
Start: 2017-10-20 | End: 2017-10-21 | Stop reason: HOSPADM

## 2017-10-20 RX ORDER — EZETIMIBE 10 MG/1
10 TABLET ORAL DAILY
Status: DISCONTINUED | OUTPATIENT
Start: 2017-10-21 | End: 2017-10-21 | Stop reason: HOSPADM

## 2017-10-20 RX ORDER — SODIUM CHLORIDE 0.9 % (FLUSH) 0.9 %
5-10 SYRINGE (ML) INJECTION AS NEEDED
Status: DISCONTINUED | OUTPATIENT
Start: 2017-10-20 | End: 2017-10-21 | Stop reason: HOSPADM

## 2017-10-20 RX ORDER — CLOPIDOGREL BISULFATE 75 MG/1
75 TABLET ORAL DAILY
Status: DISCONTINUED | OUTPATIENT
Start: 2017-10-21 | End: 2017-10-21 | Stop reason: HOSPADM

## 2017-10-20 RX ORDER — INSULIN LISPRO 100 [IU]/ML
INJECTION, SOLUTION INTRAVENOUS; SUBCUTANEOUS
Status: DISCONTINUED | OUTPATIENT
Start: 2017-10-20 | End: 2017-10-21 | Stop reason: HOSPADM

## 2017-10-20 RX ORDER — GUAIFENESIN 100 MG/5ML
81 LIQUID (ML) ORAL DAILY
Status: DISCONTINUED | OUTPATIENT
Start: 2017-10-20 | End: 2017-10-20

## 2017-10-20 RX ORDER — SODIUM CHLORIDE 0.9 % (FLUSH) 0.9 %
5-10 SYRINGE (ML) INJECTION EVERY 8 HOURS
Status: DISCONTINUED | OUTPATIENT
Start: 2017-10-20 | End: 2017-10-21 | Stop reason: HOSPADM

## 2017-10-20 RX ORDER — SODIUM CHLORIDE 9 MG/ML
125 INJECTION, SOLUTION INTRAVENOUS ONCE
Status: COMPLETED | OUTPATIENT
Start: 2017-10-20 | End: 2017-10-20

## 2017-10-20 RX ADMIN — DEXTROSE MONOHYDRATE 12.5 G: 25 INJECTION, SOLUTION INTRAVENOUS at 02:22

## 2017-10-20 RX ADMIN — ASPIRIN 81 MG 81 MG: 81 TABLET ORAL at 02:33

## 2017-10-20 RX ADMIN — ATORVASTATIN CALCIUM 40 MG: 40 TABLET, FILM COATED ORAL at 23:22

## 2017-10-20 RX ADMIN — Medication 10 ML: at 10:30

## 2017-10-20 RX ADMIN — IOPAMIDOL 80 ML: 612 INJECTION, SOLUTION INTRAVENOUS at 03:10

## 2017-10-20 RX ADMIN — ASPIRIN 81 MG 81 MG: 81 TABLET ORAL at 10:29

## 2017-10-20 RX ADMIN — INSULIN LISPRO 2 UNITS: 100 INJECTION, SOLUTION INTRAVENOUS; SUBCUTANEOUS at 23:22

## 2017-10-20 RX ADMIN — SODIUM CHLORIDE 125 ML/HR: 900 INJECTION, SOLUTION INTRAVENOUS at 02:21

## 2017-10-20 RX ADMIN — Medication 10 ML: at 16:12

## 2017-10-20 RX ADMIN — Medication 10 ML: at 23:22

## 2017-10-20 NOTE — ED NOTES
TRANSFER - OUT REPORT:    Verbal report given to Roseline Briggs RN (name) on Khalif Au  being transferred to  [facility], [address], Room [address] (unit) for routine progression of care       Report consisted of patients Situation, Background, Assessment and   Recommendations(SBAR). Information from the following report(s) SBAR, Kardex, [facility], Recent Results and Cardiac Rhythm - Paced. was reviewed with the receiving nurse. Lines:   Peripheral IV 10/20/17 Right Antecubital (Active)   Site Assessment Clean, dry, & intact 10/20/2017  1:46 AM   Phlebitis Assessment 0 10/20/2017  1:46 AM   Infiltration Assessment 0 10/20/2017  1:46 AM   Dressing Status Clean, dry, & intact 10/20/2017  1:46 AM   Dressing Type Transparent 10/20/2017  1:46 AM   Hub Color/Line Status Pink 10/20/2017  1:46 AM   Alcohol Cap Used No 10/20/2017  1:46 AM        Opportunity for questions and clarification was provided.       Patient transported with:   Monitor

## 2017-10-20 NOTE — ED PROVIDER NOTES
HPI Comments: 2:16 AM Alfonso Hernandez is a 74 y.o. male with history of HTN, DM, HLD, CAD, CHF, Atrial Fib and Gout who presents to the ED via EMS c/o slurred speech. Per EMS,pt's family reports that they were woken up around 2 AM by the sound of the pt falling. Pt had been sleeping since 9 PM. When pt woke up, he had difficulty forming words and speech was slurred. Pt denies any pain, denies any weakness or numbness. Pt was recently taken off blood thinner (s/t Atrial Fib) after recent episode of gi bleeding. Pt A & O x3. No confusion. No prior h/o sim sx's or stroke dx. Pt denies any other acute sx at this time. PCP: Estella Sow MD      The history is provided by the patient and the EMS personnel. No  was used. Past Medical History:   Diagnosis Date    Anemia     Atrial fibrillation (HCC)     BPH (benign prostatic hypertrophy)     CAD (coronary artery disease)     CHF (congestive heart failure) (HCC)     Diabetes (HCC)     Gout     Hyperlipidemia     Hypertension     Pacemaker     Vitamin D deficiency        Past Surgical History:   Procedure Laterality Date    COLONOSCOPY N/A 4/3/2017    COLONOSCOPY performed by Alissa Dimas MD at [facility]    HX OTHER SURGICAL      colonoscopy    HX PACEMAKER      defibrillator    HX PACEMAKER PLACEMENT      HX TONSILLECTOMY           No family history on file. Social History     Social History    Marital status:      Spouse name: N/A    Number of children: N/A    Years of education: N/A     Occupational History    Not on file.      Social History Main Topics    Smoking status: Former Smoker     Quit date: 7/29/1981    Smokeless tobacco: Never Used    Alcohol use No    Drug use: No    Sexual activity: Not on file     Other Topics Concern    Not on file     Social History Narrative         ALLERGIES: Tradjenta [linagliptin] and Ace inhibitors    Review of Systems   Constitutional: Negative for diaphoresis and fever. HENT: Negative for congestion. Respiratory: Negative for cough and shortness of breath. Cardiovascular: Negative for chest pain. Gastrointestinal: Negative for abdominal pain and nausea. Musculoskeletal: Negative for back pain. Skin: Negative for rash. Neurological: Positive for speech difficulty. Negative for dizziness, weakness, numbness and headaches. All other systems reviewed and are negative. Vitals:    10/20/17 0215 10/20/17 0230 10/20/17 0315 10/20/17 0330   BP: 126/83 129/77 125/80 140/88   Pulse: 85 83 78 84   Resp: 18 19 16 15   Temp:       SpO2: 100% 100% 100% 100%   Weight:       Height:                Physical Exam   Constitutional: He is oriented to person, place, and time. He appears well-developed and well-nourished. HENT:   Head: Normocephalic and atraumatic. Eyes: Pupils are equal, round, and reactive to light. Neck: Neck supple. Cardiovascular: Normal rate. No murmur heard. Pulmonary/Chest: Effort normal. He has no wheezes. Abdominal: Soft. There is no tenderness. Musculoskeletal: He exhibits no tenderness. Neurological: He is alert and oriented to person, place, and time. He has normal strength. No sensory deficit. Expressive aphasia   Skin: No pallor. Nursing note and vitals reviewed.        MDM  Number of Diagnoses or Management Options    ED Course       Procedures           Vitals:  Patient Vitals for the past 12 hrs:   Temp Pulse Resp BP SpO2   10/20/17 0330 - 84 15 140/88 100 %   10/20/17 0315 - 78 16 125/80 100 %   10/20/17 0230 - 83 19 129/77 100 %   10/20/17 0215 - 85 18 126/83 100 %   10/20/17 0204 97.6 °F (36.4 °C) 92 16 136/79 100 %   10/20/17 0200 (P) 97.6 °F (36.4 °C) 78 21 147/79 100 %         Medications ordered:   Medications   0.9% sodium chloride infusion (125 mL/hr IntraVENous New Bag 10/20/17 0221)   dextrose (D50W) injection syrg 12.5 g (12.5 g IntraVENous Given 10/20/17 0222)   aspirin chewable tablet 81 mg (81 mg Oral Given 10/20/17 7855)   iopamidol (ISOVUE 300) 61 % contrast injection 80 mL (80 mL IntraVENous Given 10/20/17 7270)         Lab findings:  Recent Results (from the past 12 hour(s))   CBC WITH AUTOMATED DIFF    Collection Time: 10/20/17  1:29 AM   Result Value Ref Range    WBC 5.7 4.6 - 13.2 K/uL    RBC 2.87 (L) 4.70 - 5.50 M/uL    HGB 7.8 (L) 13.0 - 16.0 g/dL    HCT 24.7 (L) 36.0 - 48.0 %    MCV 86.1 74.0 - 97.0 FL    MCH 27.2 24.0 - 34.0 PG    MCHC 31.6 31.0 - 37.0 g/dL    RDW 16.8 (H) 11.6 - 14.5 %    PLATELET 499 831 - 839 K/uL    MPV 11.3 9.2 - 11.8 FL    NEUTROPHILS 66 40 - 73 %    LYMPHOCYTES 22 21 - 52 %    MONOCYTES 9 3 - 10 %    EOSINOPHILS 2 0 - 5 %    BASOPHILS 1 0 - 2 %    ABS. NEUTROPHILS 3.7 1.8 - 8.0 K/UL    ABS. LYMPHOCYTES 1.3 0.9 - 3.6 K/UL    ABS. MONOCYTES 0.5 0.05 - 1.2 K/UL    ABS. EOSINOPHILS 0.1 0.0 - 0.4 K/UL    ABS.  BASOPHILS 0.1 (H) 0.0 - 0.06 K/UL    DF AUTOMATED      RBC COMMENTS ANISOCYTOSIS  2+        RBC COMMENTS HYPOCHROMIA  1+       METABOLIC PANEL, BASIC    Collection Time: 10/20/17  1:29 AM   Result Value Ref Range    Sodium 140 136 - 145 mmol/L    Potassium 3.6 3.5 - 5.5 mmol/L    Chloride 101 100 - 108 mmol/L    CO2 31 21 - 32 mmol/L    Anion gap 8 3.0 - 18 mmol/L    Glucose 55 (L) 74 - 99 mg/dL    BUN 31 (H) 7.0 - 18 MG/DL    Creatinine 1.67 (H) 0.6 - 1.3 MG/DL    BUN/Creatinine ratio 19 12 - 20      GFR est AA 49 (L) >60 ml/min/1.73m2    GFR est non-AA 40 (L) >60 ml/min/1.73m2    Calcium 8.9 8.5 - 10.1 MG/DL   PROTHROMBIN TIME + INR    Collection Time: 10/20/17  1:29 AM   Result Value Ref Range    Prothrombin time 13.0 11.5 - 15.2 sec    INR 1.0 0.8 - 1.2     PTT    Collection Time: 10/20/17  1:29 AM   Result Value Ref Range    aPTT 32.0 23.0 - 36.4 SEC   CARDIAC PANEL,(CK, CKMB & TROPONIN)    Collection Time: 10/20/17  1:29 AM   Result Value Ref Range     39 - 308 U/L    CK - MB <1.0 <3.6 ng/ml    CK-MB Index  0.0 - 4.0 %     CALCULATION NOT PERFORMED WHEN RESULT IS BELOW LINEAR LIMIT    Troponin-I, Qt. 0.02 0.00 - 0.06 NG/ML   EKG, 12 LEAD, INITIAL    Collection Time: 10/20/17  2:00 AM   Result Value Ref Range    Ventricular Rate 87 BPM    Atrial Rate 416 BPM    QRS Duration 158 ms    Q-T Interval 434 ms    QTC Calculation (Bezet) 522 ms    Calculated R Axis 74 degrees    Calculated T Axis 62 degrees    Diagnosis       Electronic ventricular pacemaker  When compared with ECG of 04-OCT-2017 17:54,  premature ventricular complexes are no longer present  Vent. rate has increased BY   3 BPM     GLUCOSE, POC    Collection Time: 10/20/17  2:04 AM   Result Value Ref Range    Glucose (POC) 56 (L) 70 - 110 mg/dL   GLUCOSE, POC    Collection Time: 10/20/17  2:36 AM   Result Value Ref Range    Glucose (POC) 105 70 - 110 mg/dL   GLUCOSE, POC    Collection Time: 10/20/17  3:18 AM   Result Value Ref Range    Glucose (POC) 99 70 - 110 mg/dL           X-Ray, CT or other radiology findings or impressions:  CT HEAD WO CONT   Final Result   IMPRESSION:      No acute hemorrhage or acute findings. Mild nonspecific white matter changes typically attributed to chronic  microvascular disease. Indeterminate right calvarial bone lesion. Recommend follow-up for stability. CTA HEAD NECK W CONT    (Results Pending)   Per radiology - NEGATIVE. Progress notes, Consult notes or additional Procedure notes:   1:59 AM Consult: I discussed care with Dr. Erick Higgins (Neu[facility] - [facility]). It was a standard discussion including patient history, chief complaint, available diagnostic results, and predicted treatment course. Will consult and evaluate. 2:04 AM Consult: I discussed care with Radiology. It was a standard discussion including patient history, chief complaint, available diagnostic results, and predicted treatment course. CT Head was negative. 2:23 AM Consult: I discussed care with Dr. Erick Higgins (Neu[facility] - [facility]).   It was a standard discussion including patient history, chief complaint, available diagnostic results, and predicted treatment course. Recommends CTA Head/Neck and admit to [facility]. 3:20 AM Consult: I discussed care with Dr. Dago Rivera ([facility]). It was a standard discussion including patient history, chief complaint, available diagnostic results, and predicted treatment course. Agrees to admit.     4:00 AM pt w improvement in speech. Disposition:  Diagnosis:   1. Aphasia        Disposition: Admit. Follow-up Information     None           Patient's Medications   Start Taking    No medications on file   Continue Taking    ALLOPURINOL (ZYLOPRIM) 300 MG TABLET    Take 150 mg by mouth daily. AMOXICILLIN (AMOXIL) 500 MG CAPSULE    Take 2 Caps by mouth every twelve (12) hours for 12 days. ASCORBIC ACID, VITAMIN C, (VITAMIN C) 1,000 MG TABLET    Take 1,000 mg by mouth. ASPIRIN 81 MG CHEWABLE TABLET    Take 81 mg by mouth daily. CARVEDILOL (COREG) 25 MG TABLET    Take 25 mg by mouth two (2) times daily (with meals). CHOLECALCIFEROL (VITAMIN D3) 1,000 UNIT TABLET    Take 1,000 Units by mouth daily. CLARITHROMYCIN (BIAXIN) 500 MG TABLET    Take 1 Tab by mouth two (2) times a day for 12 days. CYANOCOBALAMIN (VITAMIN B-12) 1,000 MCG TABLET    Take 1,000 mcg by mouth daily. EZETIMIBE (ZETIA) 10 MG TABLET    Take  by mouth. FINASTERIDE (PROSCAR) 5 MG TABLET    Take 1 Tab by mouth daily. FUROSEMIDE (LASIX) 40 MG TABLET    Take  by mouth daily. GLIMEPIRIDE (AMARYL) 4 MG TABLET    Take  by mouth every morning. LOSARTAN (COZAAR) 100 MG TABLET    Take 100 mg by mouth daily. METFORMIN (GLUCOPHAGE) 1,000 MG TABLET    Take 1,000 mg by mouth two (2) times daily (with meals). MULTIVITAMIN (ONE A DAY) TABLET    Take 1 tablet by mouth daily. PANTOPRAZOLE (PROTONIX) 40 MG TABLET    Take 1 Tab by mouth two (2) times a day. SPIRONOLACTONE (ALDACTONE) 25 MG TABLET    Take  by mouth daily.    These Medications have changed No medications on file   Stop Taking    No medications on file             [address] acting as a scribe for and in the presence of Maritza Asher MD              October 20, 2017 at 1:59 AM                            Provider Attestation:              I personally performed the services described in the documentation, reviewed the documentation, as recorded by the scribe in my presence, and it accurately and completely records my words and actions.  October 20, 2017 at 1:59 AM - Maritza Asher MD

## 2017-10-20 NOTE — PROGRESS NOTES
Problem: Mobility Impaired (Adult and Pediatric)  Goal: *Acute Goals and Plan of Care (Insert Text)  Outcome: Resolved/Met Date Met: 10/20/17  physical Therapy EVALUATION & Discharge    Patient: Carmelina Randall (81 y.o. male)  Date: 10/20/2017  Primary Diagnosis: Aphasia  Aphasia        Precautions: Fall       ASSESSMENT AND RECOMMENDATIONS:  Patient is 64yo M admitted to hospital for aphasia and presents today alert and agreeable to therapy and was supine in bed upon arrival. Patient transferred to sitting EOB and then transferred to standing and ambulated 200ft independently and then transferred back to bed. Patient balance in tact with no gait deviations. Patient tolerated activity well and was left resting with call bell by his side. As patient is functioning independently, skilled physical therapy is not indicated at this time. Patient agreeable to D/C from PT. Discharge Recommendations: None  Further Equipment Recommendations for Discharge: N/A      G-:CODES     Mobility  Current  CH= 0%   Goal  CH= 0%  D/C  CH= 0%. The severity rating is based on the Level of Assistance required for Functional Mobility and ADLs. Evaluation Complexity     Eval Complexity: History: MEDIUM  Complexity : 1-2 comorbidities / personal factors will impact the outcome/ POC Exam:LOW Complexity : 1-2 Standardized tests and measures addressing body structure, function, activity limitation and / or participation in recreation  Presentation: LOW Complexity : Stable, uncomplicated  Clinical Decision Making:Low Complexity   Overall Complexity:LOW     SUBJECTIVE:   Patient stated I feel pretty good.     OBJECTIVE DATA SUMMARY:     Past Medical History:   Diagnosis Date    Anemia     Atrial fibrillation ([address]     BPH (benign prostatic hypertrophy)     CAD (coronary artery disease)     CHF (congestive heart failure) (HCC)     Diabetes (HCC)     Gout     Hyperlipidemia     Hypertension     Pacemaker     Vitamin D deficiency      Past Surgical History:   Procedure Laterality Date    COLONOSCOPY N/A 4/3/2017    COLONOSCOPY performed by Aliya Grady MD at [facility]    HX OTHER SURGICAL      colonoscopy    HX PACEMAKER      defibrillator    HX PACEMAKER PLACEMENT      HX TONSILLECTOMY       Barriers to Learning/Limitations: None  Compensate with: visual, verbal, tactile, kinesthetic cues/model  Prior Level of Function/Home Situation: Patient lives in 2 story home with FFSU and was independent with mobility and I/ADL's. Home Situation  Home Environment: Private residence  # Steps to Enter: 2  One/Two Story Residence: Two story, live on 1st floor  Living Alone: No  Support Systems: Family member(s)  Patient Expects to be Discharged to[de-identified] Private residence  Current DME Used/Available at Home: None  Critical Behavior:  Neurologic State: Anesthetized  Orientation Level: Oriented X4  Cognition: Follows commands    Strength:    Strength: Within functional limits (BLE)   Tone & Sensation:   Tone: Normal (BLE)   Sensation: Intact (BLE)   Range Of Motion:  AROM: Within functional limits (BLE)   Functional Mobility:  Bed Mobility:   Supine to Sit: Independent  Sit to Supine: Independent  Scooting: Independent  Transfers:  Sit to Stand: Independent  Stand to Sit: Independent      Balance:   Sitting: Intact  Standing: Intact  Ambulation/Gait Training:  Distance (ft): 200 Feet (ft)  Assistive Device:  (NONE)  Ambulation - Level of Assistance: Independent   Gait Abnormalities: Decreased step clearance   Pain:  Pt reports 0/10 pain or discomfort prior to treatment.    Pt reports 0/10 pain or discomfort post treatment. Activity Tolerance:   Patient tolerated activity well and denied need for further assistance. Please refer to the flowsheet for vital signs taken during this treatment.   After treatment:   []         Patient left in no apparent distress sitting up in chair  [x]         Patient left in no apparent distress in bed  [x]         Call bell left within reach  []         Nursing notified  []         Caregiver present  []         Bed alarm activated    COMMUNICATION/EDUCATION:   [x]         Fall prevention education was provided and the patient/caregiver indicated understanding. [x]         Patient/family have participated as able in goal setting and plan of care. [x]         Patient/family agree to work toward stated goals and plan of care. []         Patient understands intent and goals of therapy, but is neutral about his/her participation. []         Patient is unable to participate in goal setting and plan of care.     Thank you for this referral.  Rell Patterson, PT   Time Calculation: 17 mins

## 2017-10-20 NOTE — PROGRESS NOTES
Care Management Interventions  PCP Verified by CM: Yes  Mode of Transport at Discharge:  (family)  Transition of Care Consult (CM Consult): [address]: Yes  Physical Therapy Consult: Yes  Occupational Therapy Consult: Yes  Speech Therapy Consult: Yes  Current Support Network: Relative's Home  Confirm Follow Up Transport: Family  Plan discussed with Pt/Family/Caregiver: Yes  Freedom of Choice Offered: Yes (home health)  Discharge Location  Discharge Placement: Home with home health    Pt is a 74year old admitted for aphasia. Pt is alert and oriented and alone in room. Pt reports that he lives with his daughter and sister Roselyn Rodgers [phone]. Pt reports that prior to admission he was independent in his ADLs and that he has no DME at bedside. Pt agreeable to home health on discharge. FOC signed for [facility]. Referral sent. Spoke to Natalia with [facility]. Pt reports that he plans to return home on discharge and that he has transportation home with family. [facility] Readmission Patient Interview    The following is related to the patient's last admission and the events following discharge from the hospital:      Date of last hospital discharge:  10-10-17    Date of Readmission:  10-20-17    Reason for Readmission:  Aphasia, fall    Were you kept informed about your diagnoses during your stay in the hospital and what was being done to further evaluate and treat them? * in reference to index admission*      Interviewer Notes:       [] None of time   [] Some of time   [] Most of time   [x] All of time   At the time of your discharge, did someone talk to you about:  *if patient answers yes - ask them to recall what information they remember*     1. What your diagnoses were? 2. What tests or procedures needed to be done after you left? 3. What to watch out for regarding worsening of your disease? 4. What to do if you were experiencing worsening of your disease? 5.   Who to contact (and how) if you were experiencing worsening of your disease? Interviewer Notes:           [x] Yes  [] No  [] Not Sure   [x] Yes  [] No  [] Not Sure   [x] Yes  [] No  [] Not Sure   [x] Yes  [] No  [] Not Sure   [x] Yes  [] No  [] Not Sure   Were you asked about your understanding of these instructions? Interviewer Notes:    [x] Yes  [] No  [] Not Sure   Were the discharge instructions written down and given to you before you left? Interviewer Notes:    [x] Yes  [] No  [] Not Sure   Were the written discharge instructions and plans easy to read and understand? Interviewer Notes:    [x] Yes  [] No  [] Not Sure   How confident were you about understanding these instructions? Interviewer Notes:  [x] Very confident   [] Somewhat confident   [] Not confident   [] Not Sure   Do you have a regular doctor who takes care of you for most things? Primary Providers Name/Practice Name:[place] [address] [address]  [x] Yes  [] No  [] Not Sure   At the time of your discharge, did someone talk to you about which medications to take when you left and which ones to discontinue? Interviewer Notes:    [x] Yes  [] No  [] Not Sure   Did you take your medications as they were prescribed? Interviewer Notes:    [x] Yes  [] No  [] Not Sure   If not, what were the difficulties you experienced with taking your medications? Comments:          After you left the hospital, did you have an appointment with your doctor? [x] Yes  [] No  [] Not Sure     If yes, who made the appointment? [] I did    [] My family   [x] Hospital staff   [] Not Sure   Were you able to get to this appointment?      Interviewer Notes:    [x] Yes  [] No  [] Not Sure   How do you think you became sick enough to be readmitted to the hospital?   Comment:  \"I passed out\"      Source:  Modified from [facility], [facility] Accept[facility] [place], [place]

## 2017-10-20 NOTE — IP AVS SNAPSHOT
Alejandro Eliecer 
 
 
 [address] Patient: Deysi Alexandre MRN: [id number] OIB:[id number] You are allergic to the following Allergen Reactions Tradjenta (Linagliptin) Anaphylaxis Ace Inhibitors Angioedema Recent Documentation Height Weight BMI Smoking Status 1.727 m 99.3 kg 33.29 kg/m2 Former Smoker Emergency Contacts Name Discharge Info Relation Home Work Mobile Shantal Rios NO [2] Other Relative [6] [phone] About your hospitalization You were admitted on:  October 20, 2017 You last received care in the:  [facility] [address] You were discharged on:  October 21, 2017 Unit phone number:  [phone] Why you were hospitalized Your primary diagnosis was:  Not on File Your diagnoses also included:  Aphasia Providers Seen During Your Hospitalizations Provider Role Specialty Primary office phone Lizy Dickson MD Attending Provider Emergency Medicine [phone] Huy Zhao MD Attending Provider Internal Medicine [phone] Your Primary Care Physician (PCP) Primary Care Physician Office Phone Office Fax Bebeto Rodriguez [address] [facility] Follow-up Information Follow up With Details Comments Contact Info Matias Fierro MD   [address] [facility] [address] [place] [address] 
[phone] Rianna Smith MD   [address] [address] [phone] [place] will call pt with appt f/u appts on 10/23 in the am. b.us Current Discharge Medication List  
  
START taking these medications Dose & Instructions Dispensing Information Comments Morning Noon Evening Bedtime  
 atorvastatin 80 mg tablet Commonly known as:  LIPITOR Your last dose was:     
   
Your next dose is:    
   
   
 Dose:  80 mg  
 Take 1 Tab by mouth nightly. Quantity:  30 Tab Refills:  1  
     
   
   
   
  
 clopidogrel 75 mg Tab Commonly known as:  PLAVIX Start taking on:  10/22/2017 Your last dose was: Your next dose is:    
   
   
 Dose:  75 mg Take 1 Tab by mouth daily. Quantity:  30 Tab Refills:  1 CONTINUE these medications which have CHANGED Dose & Instructions Dispensing Information Comments Morning Noon Evening Bedtime  
 digoxin 0.25 mg tablet Commonly known as:  LANOXIN Start taking on:  10/22/2017 What changed:  medication strength Your last dose was: Your next dose is:    
   
   
 Dose:  0.25 mg Take 1 Tab by mouth daily. Quantity:  30 Tab Refills:  1  
     
   
   
   
  
 * AMARYL 4 mg tablet Generic drug:  glimepiride What changed:  Another medication with the same name was changed. Make sure you understand how and when to take each. Your last dose was: Your next dose is:    
   
   
 Dose:  4 mg Take 4 mg by mouth every morning. Refills:  0  
     
   
   
   
  
 * glimepiride 4 mg tablet Commonly known as:  AMARYL What changed:   
- how much to take 
- additional instructions Your last dose was: Your next dose is:    
   
   
 Dose:  4 mg Take 1 Tab by mouth every morning. NOTE: Patient takes half a tablet daily. No new Rx written. This is entered to correct medication list.  
 Quantity:  1 Tab Refills:  0  
     
   
   
   
  
 * Notice: This list has 2 medication(s) that are the same as other medications prescribed for you. Read the directions carefully, and ask your doctor or other care provider to review them with you. CONTINUE these medications which have NOT CHANGED Dose & Instructions Dispensing Information Comments Morning Noon Evening Bedtime  
 albuterol 90 mcg/actuation inhaler Commonly known as:  PROVENTIL HFA, VENTOLIN HFA, PROAIR HFA  
   
 Your last dose was: Your next dose is:    
   
   
 Dose:  2 Puff Take 2 Puffs by inhalation every four (4) hours as needed for Wheezing or Shortness of Breath. Indications: Acute Bronchitis Refills:  0  
     
   
   
   
  
 allopurinol 300 mg tablet Commonly known as:  Tatum Ledesma Your last dose was: Your next dose is:    
   
   
 Dose:  150 mg Take 150 mg by mouth daily. Refills:  0  
     
   
   
   
  
 carvedilol 25 mg tablet Commonly known as:  Urszula Buster Your last dose was: Your next dose is:    
   
   
 Dose:  25 mg Take 25 mg by mouth two (2) times daily (with meals). Refills:  0  
     
   
   
   
  
 finasteride 5 mg tablet Commonly known as:  PROSCAR Your last dose was: Your next dose is:    
   
   
 Dose:  5 mg Take 1 Tab by mouth daily. Quantity:  90 Tab Refills:  3  
     
   
   
   
  
 furosemide 40 mg tablet Commonly known as:  LASIX Your last dose was: Your next dose is: Take  by mouth daily. Refills:  0  
     
   
   
   
  
 losartan 100 mg tablet Commonly known as:  COZAAR Your last dose was: Your next dose is:    
   
   
 Dose:  100 mg Take 100 mg by mouth daily. Refills:  0  
     
   
   
   
  
 metFORMIN 1,000 mg tablet Commonly known as:  GLUCOPHAGE Your last dose was: Your next dose is:    
   
   
 Dose:  1000 mg Take 1,000 mg by mouth two (2) times daily (with meals). Refills:  0  
     
   
   
   
  
 multivitamin tablet Commonly known as:  ONE A DAY Your last dose was: Your next dose is:    
   
   
 Dose:  1 Tab Take 1 tablet by mouth daily. Refills:  0  
     
   
   
   
  
 pantoprazole 40 mg tablet Commonly known as:  PROTONIX Your last dose was: Your next dose is:    
   
   
 Dose:  40 mg Take 1 Tab by mouth two (2) times a day. Quantity:  60 Tab Refills:  1 spironolactone 25 mg tablet Commonly known as:  ALDACTONE Your last dose was: Your next dose is: Take  by mouth daily. Refills:  0  
     
   
   
   
  
 VITAMIN B-12 1,000 mcg tablet Generic drug:  cyanocobalamin Your last dose was: Your next dose is:    
   
   
 Dose:  1000 mcg Take 1,000 mcg by mouth daily. Refills:  0  
     
   
   
   
  
 VITAMIN C 1,000 mg tablet Generic drug:  ascorbic acid (vitamin C) Your last dose was: Your next dose is:    
   
   
 Dose:  1000 mg Take 1,000 mg by mouth. Refills:  0  
     
   
   
   
  
 VITAMIN D3 1,000 unit tablet Generic drug:  cholecalciferol Your last dose was: Your next dose is:    
   
   
 Dose:  1000 Units Take 1,000 Units by mouth daily. Refills:  0 ZETIA 10 mg tablet Generic drug:  ezetimibe Your last dose was: Your next dose is: Take  by mouth. Refills:  0 STOP taking these medications   
 amoxicillin 500 mg capsule Commonly known as:  AMOXIL  
   
  
 aspirin 81 mg chewable tablet  
   
  
 clarithromycin 500 mg tablet Commonly known as:  BIAXIN  
   
  
 warfarin 10 mg tablet Commonly known as:  COUMADIN  
   
  
 ZOCOR 40 mg tablet Generic drug:  simvastatin Where to Get Your Medications Information on where to get these meds will be given to you by the nurse or doctor. ! Ask your nurse or doctor about these medications  
  atorvastatin 80 mg tablet  
 clopidogrel 75 mg Tab  
 digoxin 0.25 mg tablet  
 glimepiride 4 mg tablet Discharge Instructions Patient armband removed and shredded MyChart Activation Thank you for requesting access to lucierna. Please follow the instructions below to securely access and download your online medical record.  lucierna allows you to send messages to your doctor, view your test results, renew your prescriptions, schedule appointments, and more. How Do I Sign Up? 1. In your internet browser, go to [email or web address] 
2. Click on the First Time User? Click Here link in the Sign In box. You will be redirect to the New Member Sign Up page. 3. Enter your Blue Pillar Access Code exactly as it appears below. You will not need to use this code after youve completed the sign-up process. If you do not sign up before the expiration date, you must request a new code. Blue Pillar Access Code: [email or web address] Expires: 2018  4:21 PM (This is the date your Blue Pillar access code will ) 4. Enter the last four digits of your Social Security Number (xxxx) and Date of Birth (mm/dd/yyyy) as indicated and click Submit. You will be taken to the next sign-up page. 5. Create a Blue Pillar ID. This will be your Blue Pillar login ID and cannot be changed, so think of one that is secure and easy to remember. 6. Create a Blue Pillar password. You can change your password at any time. 7. Enter your Password Reset Question and Answer. This can be used at a later time if you forget your password. 8. Enter your e-mail address. You will receive e-mail notification when new information is available in [address] 9. Click Sign Up. You can now view and download portions of your medical record. 10. Click the Download Summary menu link to download a portable copy of your medical information. Additional Information If you have questions, please visit the Frequently Asked Questions section of the Blue Pillar website at [email or web address] Remember, Blue Pillar is NOT to be used for urgent needs. For medical emergencies, dial 911. Learning About Aphasia What is aphasia? Aphasia is the loss of communication skills. Aphasia may affect how well a person can speak, read, write, and understand language. Some people may not be able to read, write, or express their thoughts in words. Or they may not understand written or spoken words. The most common cause of aphasia is a stroke. A stroke can damage the left side of the brain. This is the side of the brain that handles language. When a person can't speak or write, it's called nonfluent or expressive aphasia. When a person can't understand written or spoken words, it's called fluent or receptive aphasia. How can you manage aphasia? Sometimes, other parts of the brain take over for the damaged parts. Many people get back some of their skills. But some people have lasting problems. A speech-language pathologist can help some people relearn lost skills. Getting support It's common to feel sad and hopeless when you have aphasia. It's important to let caregivers know about these feelings. It's also important to get treatment for depression if needed. A strong support network of family and friends is very important. They can help with daily tasks and treatment. The American Stroke Association and the National Stroke Association may offer local support groups. You can also find resources and information at the [employer] on Deafness and Other Communication Disorders. What can caregivers do? Here are some ways caregivers can help: 
· Support and encourage your loved one to take part in a rehab program. 
· Visit and talk with your loved one often. · Take part in education programs, and attend rehab sessions. · Help your loved one learn and practice new skills. Communication problems can be very frustrating. Be patient, understanding, and supportive. Here are some tips: · Speak directly to your loved one. Keep eye contact. · Speak slowly and simply. Use your normal tone and volume. · Give your loved one enough time to answer. · Focus on what the person is saying. Don't focus on how he or she is saying it. · Don't fill in words unless you are asked. · Ask the person to repeat something if you do not understand. · Limit conversations to small groups or one on one. Large group conversations may be hard for your loved one to follow. Follow-up care is a key part of your treatment and safety. Be sure to make and go to all appointments, and call your doctor if you are having problems. It's also a good idea to know your test results and keep a list of the medicines you take. Where can you learn more? Go to http://yaw-junie.info/. Enter Y726 in the search box to learn more about \"Learning About Aphasia. \" Current as of: March 20, 2017 Content Version: 11.3 © 1193-4394 [employer]. Care instructions adapted under license by [employer] (which disclaims liability or warranty for this information). If you have questions about a medical condition or this instruction, always ask your healthcare professional. [place] [address] any warranty or liability for your use of this information. DISCHARGE SUMMARY from Nurse The following personal items are in your possession at time of discharge: 
 
Dental Appliances: None Visual Aid: Glasses, With patient Home Medications: None Jewelry: Ring Clothing: Belt, Footwear, Pants, Shirt, Socks, Undergarments Other Valuables: Cell Phone PATIENT INSTRUCTIONS: 
 
 
F-face looks uneven A-arms unable to move or move unevenly S-speech slurred or non-existent T-time-call 911 as soon as signs and symptoms begin-DO NOT go Back to bed or wait to see if you get better-TIME IS BRAIN. Warning Signs of HEART ATTACK Call 911 if you have these symptoms: 
? Chest discomfort. Most heart attacks involve discomfort in the center of the chest that lasts more than a few minutes, or that goes away and comes back. It can feel like uncomfortable pressure, squeezing, fullness, or pain. ? Discomfort in other areas of the upper body. Symptoms can include pain or discomfort in one or both arms, the back, neck, jaw, or stomach. ? Shortness of breath with or without chest discomfort. ? Other signs may include breaking out in a cold sweat, nausea, or lightheadedness. Don't wait more than five minutes to call [address]! Fast action can save your life. Calling 911 is almost always the fastest way to get lifesaving treatment. Emergency Medical Services staff can begin treatment when they arrive  up to an hour sooner than if someone gets to the hospital by car. The discharge information has been reviewed with the patient. The patient verbalized understanding. Discharge medications reviewed with the patient and appropriate educational materials and side effects teaching were provided. Discharge Instructions Attachments/References ATORVASTATIN (BY MOUTH) (ENGLISH) CLOPIDOGREL (BY MOUTH) (ENGLISH) STROKE (ENGLISH) Discharge Orders None Findery Announcement We are excited to announce that we are making your provider's discharge notes available to you in Findery. You will see these notes when they are completed and signed by the physician that discharged you from your recent hospital stay. If you have any questions or concerns about any information you see in Findery, please call the Health Information Department where you were seen or reach out to your Primary Care Provider for more information about your plan of care. Introducing [address]!    
 [facility] introduces Findery patient portal. Now you can access parts of your medical record, email your doctor's office, and request medication refills online. 1. In your internet browser, go to [email or web address] 2. Click on the First Time User? Click Here link in the Sign In box. You will see the New Member Sign Up page. 3. Enter your HackerEarth Access Code exactly as it appears below. You will not need to use this code after youve completed the sign-up process. If you do not sign up before the expiration date, you must request a new code. · HackerEarth Access Code: [email or web address] Expires: 1/8/2018  4:21 PM 
 
4. Enter the last four digits of your Social Security Number (xxxx) and Date of Birth (mm/dd/yyyy) as indicated and click Submit. You will be taken to the next sign-up page. 5. Create a HackerEarth ID. This will be your HackerEarth login ID and cannot be changed, so think of one that is secure and easy to remember. 6. Create a HackerEarth password. You can change your password at any time. 7. Enter your Password Reset Question and Answer. This can be used at a later time if you forget your password. 8. Enter your e-mail address. You will receive e-mail notification when new information is available in [address] 9. Click Sign Up. You can now view and download portions of your medical record. 10. Click the Download Summary menu link to download a portable copy of your medical information. If you have questions, please visit the Frequently Asked Questions section of the HackerEarth website. Remember, HackerEarth is NOT to be used for urgent needs. For medical emergencies, dial 911. Now available from your iPhone and Android! General Information Please provide this summary of care documentation to your next provider. Patient Signature:  ____________________________________________________________ Date:  ____________________________________________________________  
  
Tatiana Waukegan  Provider Signature: ____________________________________________________________ Date:  ____________________________________________________________ More Information Atorvastatin (By mouth) Atorvastatin (a-tor-va-STAT-in) Treats high cholesterol and triglyceride levels. Reduces the risk of angina, stroke, heart attack, or certain heart and blood vessel problems. This medicine is a statin. Brand Name(s): Lipitor There may be other brand names for this medicine. When This Medicine Should Not Be Used: This medicine is not right for everyone. Do not use it if you had an allergic reaction to atorvastatin, if you have active liver disease, or if you are pregnant or breastfeeding. How to Use This Medicine:  
Tablet · Take your medicine as directed. Your dose may need to be changed several times to find what works best for you. · Take this medicine at the same time each day. · Swallow the tablet whole. Do not break it. · Missed dose: Take a dose as soon as you remember. If it is less than 12 hours until your next dose, skip the missed dose and take the next dose at the regular time. Do not take 2 doses of this medicine within 12 hours. · Read and follow the patient instructions that come with this medicine. Talk to your doctor or pharmacist if you have any questions. · Store the medicine in a closed container at room temperature, away from heat, moisture, and direct light. Drugs and Foods to Avoid: Ask your doctor or pharmacist before using any other medicine, including over-the-counter medicines, vitamins, and herbal products. · Some medicines can affect how atorvastatin works. Tell your doctor if you also use birth control pills, boceprevir, cimetidine, colchicine, cyclosporine, digoxin, niacin, rifampin, spironolactone, telaprevir, medicine to treat an infection, or medicine to treat HIV/AIDS. Warnings While Using This Medicine: · It is not safe to take this medicine during pregnancy.  It could harm an unborn baby. Tell your doctor right away if you become pregnant. · Tell your doctor if you have kidney disease, diabetes, muscle pain or weakness, thyroid problems, have recently had a stroke or TIA (transient ischemic attack), or have a history of liver disease. Tell your doctor if you drink grapefruit juice or drink alcohol regularly. · This medicine can cause muscle problems, which can lead to kidney problems. · Tell any doctor or dentist who treats you that you use this medicine. You may need to stop using it if you have surgery, have an injury, or develop serious health problems. · Your doctor will do lab tests at regular visits to check on the effects of this medicine. Keep all appointments. · Keep all medicine out of the reach of children. Never share your medicine with anyone. Possible Side Effects While Using This Medicine:  
Call your doctor right away if you notice any of these side effects: · Allergic reaction: Itching or hives, swelling in your face or hands, swelling or tingling in your mouth or throat, chest tightness, trouble breathing · Blistering, peeling, red skin rash · Change in how much or how often you urinate · Dark urine or pale stools, nausea, vomiting, loss of appetite, stomach pain, yellow skin or eyes · Fever · Muscle pain, tenderness, or weakness · Unusual tiredness If you notice these less serious side effects, talk with your doctor: · Diarrhea · Joint pain If you notice other side effects that you think are caused by this medicine, tell your doctor. Call your doctor for medical advice about side effects. You may report side effects to FDA at 4-299-FDA-6024 © 2017 [address] Information is for End User's use only and may not be sold, redistributed or otherwise used for commercial purposes. The above information is an  only. It is not intended as medical advice for individual conditions or treatments.  Talk to your doctor, nurse or pharmacist before following any medical regimen to see if it is safe and effective for you. Clopidogrel (By mouth) Clopidogrel (ygxk-UPD-zy-grel) Helps prevent stroke, heart attack, and other heart problems. This medicine is a platelet inhibitor. Brand Name(s): Plavix There may be other brand names for this medicine. When This Medicine Should Not Be Used: This medicine is not right for everyone. Do not use it if you had an allergic reaction to clopidogrel. How to Use This Medicine:  
Tablet · Your doctor will tell you how much medicine to use. Do not use more than directed. · This medicine should come with a Medication Guide. Ask your pharmacist for a copy if you do not have one. · Missed dose: Take a dose as soon as you remember. If it is almost time for your next dose, wait until then and take a regular dose. Do not take extra medicine to make up for a missed dose. · Store the medicine in a closed container at room temperature, away from heat, moisture, and direct light. Drugs and Foods to Avoid: Ask your doctor or pharmacist before using any other medicine, including over-the-counter medicines, vitamins, and herbal products. · Some medicines can affect how clopidogrel works. Tell your doctor if you are using any of the following: ¨ Esomeprazole, omeprazole, repaglinide, or warfarin ¨ Medicine to treat depression ¨ NSAID pain or arthritis medicine (including celecoxib, diclofenac, ibuprofen, or naproxen) Warnings While Using This Medicine: · Tell your doctor if you are pregnant or breastfeeding, or if you have bleeding problems, stomach ulcer or bleeding, a recent stroke, or have a history of bleeding problems. · This medicine can cause you to bleed and bruise more easily. Take precautions to avoid injury. Brush and floss your teeth gently, do not play rough sports, and be careful with sharp objects. Severe bleeding can be life-threatening. · This medicine may cause a rare but serious blood clotting condition called thrombotic thrombocytopenic purpura. · Make sure any doctor or dentist who treats you knows that you are using this medicine. Tell your doctor if you plan to have surgery or a dental procedure. · Do not stop using this medicine suddenly. Your doctor will need to slowly decrease your dose before you stop it completely. · Your doctor will check your progress and the effects of this medicine at regular visits. Keep all appointments. · Keep all medicine out of the reach of children. Never share your medicine with anyone. Possible Side Effects While Using This Medicine:  
Call your doctor right away if you notice any of these side effects: · Allergic reaction: Itching or hives, swelling in your face or hands, swelling or tingling in your mouth or throat, chest tightness, trouble breathing · Bloody or black, tarry stools · Nosebleeds · Pinpoint red or purple spots on your skin or in your mouth · Problems with vision, speech, or walking · Red or dark brown urine · Seizures · Severe stomach pain · Trouble breathing, tiredness, fast heartbeat, yellow skin or eyes · Unusual bleeding, bruising, or weakness · Vomiting of blood or vomit that looks like coffee grounds If you notice other side effects that you think are caused by this medicine, tell your doctor. Call your doctor for medical advice about side effects. You may report side effects to FDA at 0-746-FDA-6929 © 2017 [facility] Information is for End User's use only and may not be sold, redistributed or otherwise used for commercial purposes. The above information is an  only. It is not intended as medical advice for individual conditions or treatments. Talk to your doctor, nurse or pharmacist before following any medical regimen to see if it is safe and effective for you. Stroke: Care Instructions Your Care Instructions You have had a stroke. This means that the blood flow to a part of your brain was blocked for some time, which damages the nerve cells in that part of the brain. The part of your body controlled by that part of your brain may not function properly now. The brain is an amazing organ that can heal itself to some degree. The stroke you had damaged part of your brain. But other parts of your brain may take over in some way for the damaged areas. You have already started this process. Your doctor will talk with you about what you can do to prevent another stroke. High blood pressure, high cholesterol, and diabetes are all risk factors for stroke. If you have any of these conditions, work with your doctor to make sure they are under control. Other risk factors for stroke include being overweight, smoking, and not getting regular exercise. Going home may be hard for you and your family. The more you can try to do for yourself, the better. Remember to take each day one at a time. Follow-up care is a key part of your treatment and safety. Be sure to make and go to all appointments, and call your doctor if you are having problems. It's also a good idea to know your test results and keep a list of the medicines you take. How can you care for yourself at home? · Enter a stroke rehabilitation (rehab) program, if your doctor recommends it. Physical, speech, and occupational therapies can help you manage bathing, dressing, eating, and other basics of daily living. · Do not drive until your doctor says it is okay. · It is normal to feel sad or depressed after a stroke. If these feelings last, talk to your doctor. · If you are having problems with urine leakage, go to the bathroom at regular times, including when you first wake up and at bedtime. Also, limit fluids after dinner. · If you are constipated, drink plenty of fluids, enough so that your urine is light yellow or clear like water.  If you have kidney, heart, or liver disease and have to limit fluids, talk with your doctor before you increase the amount of fluids you drink. Set up a regular time for using the toilet. If you continue to have constipation, your doctor may suggest using a bulking agent, such as Metamucil, or a stool softener, laxative, or enema. Medicines · Take your medicines exactly as prescribed. Call your doctor if you think you are having a problem with your medicine. You may be taking several medicines. ACE (angiotensin-converting enzyme) inhibitors, angiotensin II receptor blockers (ARBs), beta-blockers, diuretics (water pills), and calcium channel blockers control your blood pressure. Statins help lower cholesterol. Your doctor may also prescribe medicines for depression, pain, sleep problems, anxiety, or agitation. · If your doctor has given you a blood thinner to prevent another stroke, be sure you get instructions about how to take your medicine safely. Blood thinners can cause serious bleeding problems. · Do not take any over-the-counter medicines or herbal products without talking to your doctor first. 
· If you take birth control pills or hormone therapy, talk to your doctor about whether they are right for you. For family members and caregivers · Make the home safe. Set up a room so that your loved one does not have to climb stairs. Be sure the bathroom is on the same floor. Move throw rugs and furniture that could cause falls. Make sure that the lighting is good. Put grab bars and seats in tubs and showers. · Find out what your loved one can do and what he or she needs help with. Try not to do things for your loved one that your loved one can do on his or her own. Help him or her learn and practice new skills. · Visit and talk with your loved one often. Try doing activities together that you both enjoy, such as playing cards or board games. Keep in touch with your loved one's friends as much as you can. Encourage them to visit. · Take care of yourself. Do not try to do everything yourself. Ask other family members to help. Eat well, get enough rest, and take time to do things that you enjoy. Keep up with your own doctor visits, and make sure to take your medicines regularly. Get out of the house as much as you can. Join a local support group. Find out if you qualify for home health care visits to help with rehab or for adult day care. When should you call for help? Call 911 anytime you think you may need emergency care. For example, call if: 
· You have signs of another stroke. These may include: 
¨ Sudden numbness, tingling, weakness, or loss of movement in your face, arm, or leg, especially on only one side of your body. ¨ Sudden vision changes. ¨ Sudden trouble speaking. ¨ Sudden confusion or trouble understanding simple statements. ¨ Sudden problems with walking or balance. ¨ A sudden, severe headache that is different from past headaches. Call 911 even if these symptoms go away in a few minutes. Call your doctor now or seek immediate medical care if: 
· You have new symptoms that may be related to your stroke, such as falls or trouble swallowing. Watch closely for changes in your health, and be sure to contact your doctor if you have any problems. Where can you learn more? Go to http://yaw-junie.info/. Enter A058 in the search box to learn more about \"Stroke: Care Instructions. \" Current as of: March 20, 2017 Content Version: 11.3 © 8497-7098 [employer]. Care instructions adapted under license by [employer] (which disclaims liability or warranty for this information). If you have questions about a medical condition or this instruction, always ask your healthcare professional. [place] [address] any warranty or liability for your use of this information.

## 2017-10-20 NOTE — IP AVS SNAPSHOT
Summary of Care Report The Summary of Care report has been created to help improve care coordination. Users with access to i[employer] or [address] (Web-based application) may access additional patient information including the Discharge Summary. If you are not currently a [address] user and need more information, please call the number listed below in the [place] [address] section and ask to be connected with Medical Records. Facility Information Name Address Phone [address] [facility] [address] [phone] Patient Information Patient Name Sex  Brittney Valenzuela ([id number]) Male 1945 Discharge Information Admitting Provider Service Area Unit Stacey Archer MD / [phone] [address] [facility] / [phone] Discharge Provider Discharge Date/Time Discharge Disposition Destination (none) 10/21/2017 (Pending) AHR (none) Patient Language Language ENGLISH [13] Hospital Problems as of 10/21/2017  Reviewed: 2016 10:44 AM by Oneyda Bartlett MD  
  
  
  
 Class Noted - Resolved Last Modified POA Active Problems Aphasia  10/20/2017 - Present 10/20/2017 by Misti James MD Unknown Entered by Misti James MD  
  
Non-Hospital Problems as of 10/21/2017  Reviewed: 2016 10:44 AM by Oneyda Bartlett MD  
  
  
  
 Class Noted - Resolved Last Modified Active Problems Hypertension  Unknown - Present 2015 by Daljit Acevedo LPN Entered by Daljit Acevedo LPN Diabetes ([address]  Unknown - Present 2015 by Daljit Acevedo LPN Entered by Daljit Acevedo LPN Hyperlipidemia  Unknown - Present 2015 by Daljit Acevedo LPN Entered by Daljit Acevedo LPN   Gout  Unknown - Present 2015 by Daljit Acevedo LPN  
 Entered by Pravin Frausto LPN Pacemaker  Unknown - Present 2/19/2015 by Pravin Frausto, JASONN Entered by Pravin Frausto LPN Vitamin D deficiency  Unknown - Present 2/19/2015 by Pravin Frausto, JASONN Entered by Pravin Frausto LPN  
  CHF (congestive heart failure) [facility])  Unknown - Present 2/19/2015 by Pravin Frausto, JASONN Entered by Pravin Frausto, MALCOM  
  BPH (benign prostatic hypertrophy)  Unknown - Present 2/19/2015 by Pravin Frausto, JASONN Entered by Pravin Frausto, LPN Anemia  Unknown - Present 2/19/2015 by Pravin Frausto, JASONN Entered by Pravin Frausto LPN Atrial fibrillation ([address]  Unknown - Present 2/19/2015 by Pravin Frausto, JASONN Entered by Pravin Frausto LPN  
  GI bleed  39/5/0071 - Present 10/4/2017 by Nikita Vick, DO Entered by Nikita Vick, DO Symptomatic anemia  10/4/2017 - Present 10/4/2017 by Nikita Vick, DO Entered by Nikita Vick, DO  
  Rectal bleed  10/4/2017 - Present 10/4/2017 by Jj Morrissey MD  
  Entered by Jj Morrissey MD  
  Acute gastric ulcer with hemorrhage  10/5/2017 - Present 10/5/2017 by Modesta Mcrae MD  
  Entered by Modesta Mcrae MD  
  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  
  Entered by Modesta Mcrae MD  
  
You are allergic to the following Allergen Reactions Tradjenta (Linagliptin) Anaphylaxis Ace Inhibitors Angioedema Current Discharge Medication List  
  
START taking these medications Dose & Instructions Dispensing Information Comments  
 atorvastatin 80 mg tablet Commonly known as:  LIPITOR Dose:  80 mg Take 1 Tab by mouth nightly. Quantity:  30 Tab Refills:  1  
   
 clopidogrel 75 mg Tab Commonly known as:  PLAVIX Start taking on:  10/22/2017 Dose:  75 mg Take 1 Tab by mouth daily. Quantity:  30 Tab Refills:  1 CONTINUE these medications which have CHANGED Dose & Instructions Dispensing Information Comments  
 digoxin 0.25 mg tablet Commonly known as:  LANOXIN Start taking on:  10/22/2017 What changed:  medication strength Dose:  0.25 mg Take 1 Tab by mouth daily. Quantity:  30 Tab Refills:  1  
   
 * AMARYL 4 mg tablet Generic drug:  glimepiride What changed:  Another medication with the same name was changed. Make sure you understand how and when to take each. Dose:  4 mg Take 4 mg by mouth every morning. Refills:  0  
   
 * glimepiride 4 mg tablet Commonly known as:  AMARYL What changed:   
- how much to take 
- additional instructions Dose:  4 mg Take 1 Tab by mouth every morning. NOTE: Patient takes half a tablet daily. No new Rx written. This is entered to correct medication list.  
 Quantity:  1 Tab Refills:  0  
   
 * Notice: This list has 2 medication(s) that are the same as other medications prescribed for you. Read the directions carefully, and ask your doctor or other care provider to review them with you. CONTINUE these medications which have NOT CHANGED Dose & Instructions Dispensing Information Comments  
 albuterol 90 mcg/actuation inhaler Commonly known as:  PROVENTIL HFA, VENTOLIN HFA, PROAIR HFA Dose:  2 Puff Take 2 Puffs by inhalation every four (4) hours as needed for Wheezing or Shortness of Breath. Indications: Acute Bronchitis Refills:  0  
   
 allopurinol 300 mg tablet Commonly known as:  [facility] Dose:  150 mg Take 150 mg by mouth daily. Refills:  0  
   
 carvedilol 25 mg tablet Commonly known as:  Renea Golas Dose:  25 mg Take 25 mg by mouth two (2) times daily (with meals). Refills:  0  
   
 finasteride 5 mg tablet Commonly known as:  PROSCAR Dose:  5 mg Take 1 Tab by mouth daily. Quantity:  90 Tab Refills:  3  
   
 furosemide 40 mg tablet Commonly known as:  LASIX Take  by mouth daily. Refills:  0  
   
 losartan 100 mg tablet Commonly known as:  COZAAR Dose:  100 mg Take 100 mg by mouth daily. Refills:  0  
   
 metFORMIN 1,000 mg tablet Commonly known as:  GLUCOPHAGE Dose:  1000 mg Take 1,000 mg by mouth two (2) times daily (with meals). Refills:  0  
   
 multivitamin tablet Commonly known as:  ONE A DAY Dose:  1 Tab Take 1 tablet by mouth daily. Refills:  0  
   
 pantoprazole 40 mg tablet Commonly known as:  PROTONIX Dose:  40 mg Take 1 Tab by mouth two (2) times a day. Quantity:  60 Tab Refills:  1  
   
 spironolactone 25 mg tablet Commonly known as:  ALDACTONE Take  by mouth daily. Refills:  0  
   
 VITAMIN B-12 1,000 mcg tablet Generic drug:  cyanocobalamin Dose:  1000 mcg Take 1,000 mcg by mouth daily. Refills:  0  
   
 VITAMIN C 1,000 mg tablet Generic drug:  ascorbic acid (vitamin C) Dose:  1000 mg Take 1,000 mg by mouth. Refills:  0  
   
 VITAMIN D3 1,000 unit tablet Generic drug:  cholecalciferol Dose:  1000 Units Take 1,000 Units by mouth daily. Refills:  0 ZETIA 10 mg tablet Generic drug:  ezetimibe Take  by mouth. Refills:  0 STOP taking these medications Comments  
 amoxicillin 500 mg capsule Commonly known as:  AMOXIL  
   
   
 aspirin 81 mg chewable tablet  
   
   
 clarithromycin 500 mg tablet Commonly known as:  BIAXIN  
   
   
 warfarin 10 mg tablet Commonly known as:  COUMADIN  
   
   
 ZOCOR 40 mg tablet Generic drug:  simvastatin Current Immunizations Name Date Influenza Vaccine (Quad) PF 10/10/2017 Follow-up Information Follow up With Details Comments Contact Info Jennifer Edmonds MD   [address] [place] [address] [address] [place] [address] 
[phone] Discharge Instructions Patient armband removed and shredded MyChart Activation Thank you for requesting access to Allclasses. Please follow the instructions below to securely access and download your online medical record. Allclasses allows you to send messages to your doctor, view your test results, renew your prescriptions, schedule appointments, and more. How Do I Sign Up? 1. In your internet browser, go to [email or web address] 
2. Click on the First Time User? Click Here link in the Sign In box. You will be redirect to the New Member Sign Up page. 3. Enter your Allclasses Access Code exactly as it appears below. You will not need to use this code after youve completed the sign-up process. If you do not sign up before the expiration date, you must request a new code. Allclasses Access Code: [email or web address] Expires: 2018  4:21 PM (This is the date your Allclasses access code will ) 4. Enter the last four digits of your Social Security Number (xxxx) and Date of Birth (mm/dd/yyyy) as indicated and click Submit. You will be taken to the next sign-up page. 5. Create a Allclasses ID. This will be your Allclasses login ID and cannot be changed, so think of one that is secure and easy to remember. 6. Create a Allclasses password. You can change your password at any time. 7. Enter your Password Reset Question and Answer. This can be used at a later time if you forget your password. 8. Enter your e-mail address. You will receive e-mail notification when new information is available in [address] 9. Click Sign Up. You can now view and download portions of your medical record. 10. Click the Download Summary menu link to download a portable copy of your medical information. Additional Information If you have questions, please visit the Frequently Asked Questions section of the Allclasses website at [email or web address] Remember, Allclasses is NOT to be used for urgent needs. For medical emergencies, dial 911. Learning About Aphasia What is aphasia? Aphasia is the loss of communication skills. Aphasia may affect how well a person can speak, read, write, and understand language. Some people may not be able to read, write, or express their thoughts in words. Or they may not understand written or spoken words. The most common cause of aphasia is a stroke. A stroke can damage the left side of the brain. This is the side of the brain that handles language. When a person can't speak or write, it's called nonfluent or expressive aphasia. When a person can't understand written or spoken words, it's called fluent or receptive aphasia. How can you manage aphasia? Sometimes, other parts of the brain take over for the damaged parts. Many people get back some of their skills. But some people have lasting problems. A speech-language pathologist can help some people relearn lost skills. Getting support It's common to feel sad and hopeless when you have aphasia. It's important to let caregivers know about these feelings. It's also important to get treatment for depression if needed. A strong support network of family and friends is very important. They can help with daily tasks and treatment. The American Stroke Association and the National Stroke Association may offer local support groups. You can also find resources and information at the [employer] on Deafness and Other Communication Disorders. What can caregivers do? Here are some ways caregivers can help: 
· Support and encourage your loved one to take part in a rehab program. 
· Visit and talk with your loved one often. · Take part in education programs, and attend rehab sessions. · Help your loved one learn and practice new skills. Communication problems can be very frustrating. Be patient, understanding, and supportive. Here are some tips: · Speak directly to your loved one. Keep eye contact. · Speak slowly and simply. Use your normal tone and volume. · Give your loved one enough time to answer. · Focus on what the person is saying. Don't focus on how he or she is saying it. · Don't fill in words unless you are asked. · Ask the person to repeat something if you do not understand. · Limit conversations to small groups or one on one. Large group conversations may be hard for your loved one to follow. Follow-up care is a key part of your treatment and safety. Be sure to make and go to all appointments, and call your doctor if you are having problems. It's also a good idea to know your test results and keep a list of the medicines you take. Where can you learn more? Go to http://yaw-junie.info/. Enter Y726 in the search box to learn more about \"Learning About Aphasia. \" Current as of: March 20, 2017 Content Version: 11.3 © 0472-9353 [employer]. Care instructions adapted under license by [employer] (which disclaims liability or warranty for this information). If you have questions about a medical condition or this instruction, always ask your healthcare professional. [place] [address] any warranty or liability for your use of this information. DISCHARGE SUMMARY from Nurse The following personal items are in your possession at time of discharge: 
 
Dental Appliances: None Visual Aid: Glasses, With patient Home Medications: None Jewelry: Ring Clothing: Belt, Footwear, Pants, Shirt, Socks, Undergarments Other Valuables: Cell Phone PATIENT INSTRUCTIONS: 
 
 
F-face looks uneven A-arms unable to move or move unevenly S-speech slurred or non-existent T-time-call 911 as soon as signs and symptoms begin-DO NOT go Back to bed or wait to see if you get better-TIME IS BRAIN. Warning Signs of HEART ATTACK Call 911 if you have these symptoms: 
? Chest discomfort. Most heart attacks involve discomfort in the center of the chest that lasts more than a few minutes, or that goes away and comes back. It can feel like uncomfortable pressure, squeezing, fullness, or pain. ? Discomfort in other areas of the upper body. Symptoms can include pain or discomfort in one or both arms, the back, neck, jaw, or stomach. ? Shortness of breath with or without chest discomfort. ? Other signs may include breaking out in a cold sweat, nausea, or lightheadedness. Don't wait more than five minutes to call [address]! Fast action can save your life. Calling 911 is almost always the fastest way to get lifesaving treatment. Emergency Medical Services staff can begin treatment when they arrive  up to an hour sooner than if someone gets to the hospital by car. The discharge information has been reviewed with the patient. The patient verbalized understanding. Discharge medications reviewed with the patient and appropriate educational materials and side effects teaching were provided. Chart Review Routing History Recipient Method Report Sent By Rodney Ortiz MD  
Fax: [phone] Phone: [phone] Fax Note Review Kalyan Wright [[id number]] 4/22/2016 10:52 AM 04/22/2016 Reji Drake MD  
Fax: [phone] Phone: [phone] Fax Notes Report Kayli Barakat NP [[id number]] 10/10/2017  9:38 AM 10/8/2017 Reji Drake MD  
Fax: [phone] Phone: [phone] Fax Notes Report Kayli Barakat NP [[id number]] 10/10/2017  9:38 AM 10/5/2017 Bianca Ortiz MD  
Fax: [phone] Phone: [phone] Fax IP Auto Routed Trans MD Graciela Welch 10/20/2017  4:40 PM 10/20/2017 King Violeta MD  
Fax: [phone] Phone: [phone] Fax IP Auto Routed Trans MD Kathleen Wesley 10/21/2017 12:52 PM 10/21/2017 King Violeta MD  
Fax: [phone] Phone: [phone] Fax IP Auto Routed Trans MD Kathleen Wesley 10/21/2017  5:07 PM 10/21/2017

## 2017-10-20 NOTE — ROUTINE PROCESS
Bedside shift change report given to Tonny Howard (oncoming nurse) by Idamae Hodgkins RN (offgoing nurse). Report included the following information SBAR, Kardex and Recent Results. Pt on NIH every 4 hours now, scoring a 0.

## 2017-10-20 NOTE — ED TRIAGE NOTES
9124 - patient arrived via [address] [facility], Patient's family states patient went to sleep at 9pm, upon awaking patient fell out of bed while trying to stand, at that time they noted his slurred speech. Last known normal 10/19/2017 @ 2100 per family. 01:48 - Dr Erlin Weber at [facility] as patient arrived via ambulance. Code S called per Dr Erlin Weber. 01:50 - inroute to CT scan with RN and [employer] 9 crew, NIH Stroke Scale Complete - 2 (9. Mild Aphasia - 1, 10. Mild Dysarthria-1)    Patient greeted / introduced myself as their primary nurse. Encouraged to voice any concerns, and all questions/concerns addressed. Assessment in progress. Explanation and teaching of all care given,  including any pending orders or procedures. Call bell with reach. Awaiting further MD orders at this time. Patient fall risk assessed, with prevention measures in place, to include bed in lowest position with casters locked and rail up, call bell within reach, frequent toileting in progress, lights on, pathway to bathroom free from obstacles. Patient instructed to call for assist OOB at all times. Floor dry. Yellow Slip resistant socks applied with yellow armband. Instructed that staff will make hourly rounds to provide reassessments in pain control, concerns, toileting, and any other updates in care. Hand hygiene maintained prior to and after patient/staff interaction. 01:58 - [facility]ron[facility] physician Dr. Jason Walker spoke with Dr. Erlin Weber    01:59 - Arrived [facility] Bed 1. Placed on cardiac monitor    02:00 - EKG Complete    02:01 - [facility]ron[facility] physician Dr. Jason Walker assessing patient via video. 02:04 - Blood sugar 56, Dr Erlin Weber notified. 02:29 - Completed swallow evaluation:Pass    02:33 - First oral medication administered.  Aspirin 81mg

## 2017-10-20 NOTE — ROUTINE PROCESS
TRANSFER - IN REPORT:    Verbal report received from Jaiden(name) on Phillip Mason  being received from ER(unit) for routine progression of care      Report consisted of patients Situation, Background, Assessment and   Recommendations(SBAR). Information from the following report(s) SBAR and Kardex was reviewed with the receiving nurse. Opportunity for questions and clarification was provided. Assessment completed upon patients arrival to unit and care assumed.

## 2017-10-20 NOTE — H&P
[address] [facility]  ROUTINE H AND PS    Name:  Yahaira Alex  MR#:  [id number]  :  1945  Account #:  [de-identified]  Date of Adm:  10/20/2017      CHIEF COMPLAINT: Slurred speech, difficulty with word finding. HISTORY OF PRESENT ILLNESS: The patient is a 27-year-old  gentleman with a past medical history significant for hypertension,  diabetes, hyperlipidemia, coronary artery disease, atrial fibrillation,  systolic congestive heart failure, gout who was also recently taken off  Coumadin and temporarily held from aspirin due to upper  gastrointestinal bleed presenting here with the family and they report  that on waking up this morning, he had difficulty forming words and  had slurred speech. He denied any weakness. He denies any  numbness. He has no other complaints. He has not had these  symptoms before that time. He is seen with 2 daughters and his wife  who report that the patient appears to be at baseline. He has no other  complaints at the current time. Case was discussed with Neurology. REVIEW OF SYSTEMS  No headaches or visual changes, dizziness, lightheadedness, any  fevers, chills, nausea, vomiting, chest pain, shortness of breath, cough,  wheeze, abdominal pain, dysuria, hematuria, polyuria, oliguria,  constipation, diarrhea, melena, hematochezia, rash, bruise, bleed or  hot or cold intolerance. Review otherwise negative on 10-element  review. ALLERGIES  1. ACE INHIBITORS CAUSING ANGIOEDEMA. 2. HE ALSO HAS NOTED ALLERGY LISTED TO LINAGLIPTIN. MEDICATIONS  1. Protonix 40 mg p.o. daily. 2. Metformin 1000 mg p.o. b.i.d.  3. Allopurinol 300 mg half tab p.o. daily. 4. Aldactone 25 mg p.o. daily. 5. Losartan 100 mg p.o. daily. 6. Zetia 10 mg p.o. daily. 7. Amaryl 4 mg half tablet p.o. every morning. 8. Coreg 25 mg p.o. b.i.d.  9. Lasix 40 mg p.o. daily. 10. Proscar 5 mg p.o. daily. 11. B complex multivitamin p.o. daily. 12. Vitamin D3, 1000 units p.o. daily.   13. Vitamin C 500 mg 2 tabs p.o. daily. 14. Multivitamin p.o. daily. 15. Digoxin 250 mcg half tab p.o. daily. 16. Zocor 40 mg p.o. at bedtime. 17. Albuterol MDI 2 puffs every 4 hours as needed FOR wheezing or  shortness of breath. PAST MEDICAL HISTORY  1. Hypertension. 2. Diabetes mellitus. 3. Hyperlipidemia. 4. Coronary artery disease with a history of systolic congestive heart  failure with an EF of 45% based on echocardiogram from 02/2015.  5. Upper gastrointestinal bleed with a history of upper endoscopy from  October of this year showing gastritis with bleeding. PAST SURGICAL HISTORY: No interval surgical history. He has a  history of pacemaker implantation, tonsillectomy. FAMILY HISTORY: Denies. SOCIAL HISTORY: No alcohol, tobacco or any other drug use. PHYSICAL EXAMINATION  VITAL SIGNS: Last set of vitals include a temperature of 98.5, pulse  74, pressure 133/77, respiratory rate 19, saturating at 100% on room  air. GENERAL: The patient is well-developed, well-nourished, awake, alert  and oriented, in no distress. Family present at bedside. HEENT: Head is normocephalic and atraumatic. Pupils equal, round  and reactive to light. Extraocular motion intact. Nares patent both  sides. Posterior pharynx is clear. Mucous membranes are moist,  tongue is midline. NECK: Supple, no lymphadenopathy. CARDIOVASCULAR: Regular rate and rhythm. No murmur, rubs, or  gallops. PULMONARY: Clear to auscultation bilaterally. ABDOMEN: Soft, nontender, nondistended. Normal bowel sounds. No  masses are felt. EXTREMITIES: Has 5/5 motor strength x4. No calf tenderness or  edema, 2+ bilateral pedal pulses. NEUROLOGIC: Cranial nerves 2 through 12 grossly intact. LABORATORY DATA: Include a metabolic panel with a sodium 140,  potassium 3.6, chloride 101, CO2 of 31, BUN 31, creatinine 1.67,  glucose 55, calcium 8.9. CBC with a white count 5.7, hemoglobin and hematocrit of 7.8/24.7,  platelets of 381.     PT INR of 13.0/1.0, APTT of 32.0. He had a CT of his head by report showing no acute hemorrhage or  acute findings. Mild nonspecific white matter changes, typically  attributed to chronic microvascular disease. Indeterminate right  calvarial bone lesion. Recommend followup for stability. CTA of the head and neck by report shows study markedly limited due  to poor contrast enhancement as described. There are findings  suggestive of significant end stenosis at the junction of the right  subclavian vein and the brachiocephalic vein accounting for limitation  of contrast bolus and poor contrast enhancement arteries. In the neck  and bilateral carotid arterial system was ordered bilateral vertebral  arteries grossly, there is no obvious compromise or critical stenosis. Major cerebral arteries are patent and opacified without any critical  stenosis. The distal branches of cerebral arteries are not well  visualized as described. He had an EKG here, which on my read, demonstrates a paced  rhythm, rate in the 80s. IMPRESSION: A 75-year-old gentleman with a history significant for  the above, who is here with a cerebrovascular accident versus  transient ischemic attack. Symptomatology has completely resolved. He has a recent history of upper gastrointestinal bleed with gastritis  and hemorrhage based on upper endoscopy earlier in the month. He  was taken off Coumadin and held on aspirin at that time. Case  discussed with Neurology. PLAN  1. Cerebrovascular accident versus transient ischemic attack:  Maintained on stroke protocol. Permissive hypertension. We will check  lipid panel, maintain him on Zetia, statin therapy. Monitor for any  repeat symptomatology. Decision was made to maintain the patient on  Plavix alone. We will monitor for any bleed. 2. Hypertension: Permissive per #1.  3. Diabetes mellitus: We will to cover him with a sliding scale insulin  regimen.   4. Hyperlipidemia: On statin therapy now started, and also resuming  his outpatient Zetia. 5. Chronic systolic congestive heart failure: Based on echocardiogram  from 2015. We will go ahead and follow him closely. I do note that he  does have some renal dysfunction, holding off on his diuretics at the  current time. If his renal function normalizes, we will resume. Monitor  for any signs of overload. Otherwise, holding off on aspirin as we are  maintaining him on Plavix alone for #1. Resume his beta blocker and  ARB therapy on discharge. 6. Paroxysmal atrial fibrillation: He is paced at the current time. Rate is  controlled. No Coumadin due to history of bleeding. Maintain Digoxin. We will check a digoxin level as well. 7. What appears to be chronic iron-deficiency anemia based on  records. I am going to go ahead and check an iron profile as well as  Q01 and folic acid level. It looks like he received some IV Venofer with  his recent hospitalization. We will follow up on these levels,  supplement as needed. 8. Gout: No acute issues. 9. Deep vein thrombosis prophylaxis: Bilateral SCDs.         Matthew Shepherd MD PP / [place] Yang  D:  10/20/2017   14:37  T:  10/20/2017   15:26  Job #:  [id number]

## 2017-10-20 NOTE — IP AVS SNAPSHOT
Tiffanie Ridley 
 
 
 [address] [facility] [address] Patient: Sammy Keith MRN: [id number] Current Discharge Medication List  
  
START taking these medications Dose & Instructions Dispensing Information Comments Morning Noon Evening Bedtime  
 atorvastatin 80 mg tablet Commonly known as:  LIPITOR Your last dose was: Your next dose is:    
   
   
 Dose:  80 mg Take 1 Tab by mouth nightly. Quantity:  30 Tab Refills:  1  
     
   
   
   
  
 clopidogrel 75 mg Tab Commonly known as:  PLAVIX Start taking on:  10/22/2017 Your last dose was: Your next dose is:    
   
   
 Dose:  75 mg Take 1 Tab by mouth daily. Quantity:  30 Tab Refills:  1 CONTINUE these medications which have CHANGED Dose & Instructions Dispensing Information Comments Morning Noon Evening Bedtime  
 digoxin 0.25 mg tablet Commonly known as:  LANOXIN Start taking on:  10/22/2017 What changed:  medication strength Your last dose was: Your next dose is:    
   
   
 Dose:  0.25 mg Take 1 Tab by mouth daily. Quantity:  30 Tab Refills:  1  
     
   
   
   
  
 * AMARYL 4 mg tablet Generic drug:  glimepiride What changed:  Another medication with the same name was changed. Make sure you understand how and when to take each. Your last dose was: Your next dose is:    
   
   
 Dose:  4 mg Take 4 mg by mouth every morning. Refills:  0  
     
   
   
   
  
 * glimepiride 4 mg tablet Commonly known as:  AMARYL What changed:   
- how much to take 
- additional instructions Your last dose was: Your next dose is:    
   
   
 Dose:  4 mg Take 1 Tab by mouth every morning. NOTE: Patient takes half a tablet daily. No new Rx written. This is entered to correct medication list.  
 Quantity:  1 Tab Refills:  0 * Notice: This list has 2 medication(s) that are the same as other medications prescribed for you. Read the directions carefully, and ask your doctor or other care provider to review them with you. CONTINUE these medications which have NOT CHANGED Dose & Instructions Dispensing Information Comments Morning Noon Evening Bedtime  
 albuterol 90 mcg/actuation inhaler Commonly known as:  PROVENTIL HFA, VENTOLIN HFA, PROAIR HFA Your last dose was: Your next dose is:    
   
   
 Dose:  2 Puff Take 2 Puffs by inhalation every four (4) hours as needed for Wheezing or Shortness of Breath. Indications: Acute Bronchitis Refills:  0  
     
   
   
   
  
 allopurinol 300 mg tablet Commonly known as:  Teodoro Cons Your last dose was: Your next dose is:    
   
   
 Dose:  150 mg Take 150 mg by mouth daily. Refills:  0  
     
   
   
   
  
 carvedilol 25 mg tablet Commonly known as:  Dareen Kaska Your last dose was: Your next dose is:    
   
   
 Dose:  25 mg Take 25 mg by mouth two (2) times daily (with meals). Refills:  0  
     
   
   
   
  
 finasteride 5 mg tablet Commonly known as:  PROSCAR Your last dose was: Your next dose is:    
   
   
 Dose:  5 mg Take 1 Tab by mouth daily. Quantity:  90 Tab Refills:  3  
     
   
   
   
  
 furosemide 40 mg tablet Commonly known as:  LASIX Your last dose was: Your next dose is: Take  by mouth daily. Refills:  0  
     
   
   
   
  
 losartan 100 mg tablet Commonly known as:  COZAAR Your last dose was: Your next dose is:    
   
   
 Dose:  100 mg Take 100 mg by mouth daily. Refills:  0  
     
   
   
   
  
 metFORMIN 1,000 mg tablet Commonly known as:  GLUCOPHAGE Your last dose was: Your next dose is:    
   
   
 Dose:  1000 mg Take 1,000 mg by mouth two (2) times daily (with meals). Refills:  0 multivitamin tablet Commonly known as:  ONE A DAY Your last dose was: Your next dose is:    
   
   
 Dose:  1 Tab Take 1 tablet by mouth daily. Refills:  0  
     
   
   
   
  
 pantoprazole 40 mg tablet Commonly known as:  PROTONIX Your last dose was: Your next dose is:    
   
   
 Dose:  40 mg Take 1 Tab by mouth two (2) times a day. Quantity:  60 Tab Refills:  1  
     
   
   
   
  
 spironolactone 25 mg tablet Commonly known as:  ALDACTONE Your last dose was: Your next dose is: Take  by mouth daily. Refills:  0  
     
   
   
   
  
 VITAMIN B-12 1,000 mcg tablet Generic drug:  cyanocobalamin Your last dose was: Your next dose is:    
   
   
 Dose:  1000 mcg Take 1,000 mcg by mouth daily. Refills:  0  
     
   
   
   
  
 VITAMIN C 1,000 mg tablet Generic drug:  ascorbic acid (vitamin C) Your last dose was: Your next dose is:    
   
   
 Dose:  1000 mg Take 1,000 mg by mouth. Refills:  0  
     
   
   
   
  
 VITAMIN D3 1,000 unit tablet Generic drug:  cholecalciferol Your last dose was: Your next dose is:    
   
   
 Dose:  1000 Units Take 1,000 Units by mouth daily. Refills:  0 ZETIA 10 mg tablet Generic drug:  ezetimibe Your last dose was: Your next dose is: Take  by mouth. Refills:  0 STOP taking these medications   
 amoxicillin 500 mg capsule Commonly known as:  AMOXIL  
   
  
 aspirin 81 mg chewable tablet  
   
  
 clarithromycin 500 mg tablet Commonly known as:  BIAXIN  
   
  
 warfarin 10 mg tablet Commonly known as:  COUMADIN  
   
  
 ZOCOR 40 mg tablet Generic drug:  simvastatin Where to Get Your Medications Information on where to get these meds will be given to you by the nurse or doctor. ! Ask your nurse or doctor about these medications  
  atorvastatin 80 mg tablet  
 clopidogrel 75 mg Tab  
 digoxin 0.25 mg tablet  
 glimepiride 4 mg tablet

## 2017-10-20 NOTE — CONSULTS
Carmelina Randall is a 74 y.o., right handed male, with a significant history of hypertension, diabetes, hyperlipidemia, coronary artery disease, atrial fibrillation, systolic congestive heart failure, gout, was recently taken off of all anticoagulation including Coumadin therapy because of his severe upper GI bleed just discharged from the hospital approximately a week ago, who presented to the hospital at this time with word finding difficulty slurring of speech. He states that this occurred this morning. He said he felt weak all over may be he was weak on the right side. It is difficult to ascertain. He says he felt weak all over. Slurring of the speech and weakness lasted only several minutes maybe half an hour and then resolved. He denies any history of stroke. He denies any history of visual changes. Currently feels back to his baseline. Social History; , lives with his sister and daughter. No tobacco, alcohol or illicit drugs. Manual labor    Family History; father  from diabetic complications. Mother with hypertension, heart failure. Sisters hypertension, diabetes, pacemaker, cardiac disease.     Current Facility-Administered Medications   Medication Dose Route Frequency Provider Last Rate Last Dose    sodium chloride (NS) flush 5-10 mL  5-10 mL IntraVENous Q8H Wali Araya MD   10 mL at 10/20/17 1030    sodium chloride (NS) flush 5-10 mL  5-10 mL IntraVENous PRN Wali Araya MD        aspirin chewable tablet 81 mg  81 mg Oral DAILY Wali Araya MD   81 mg at 10/20/17 1029    labetalol (NORMODYNE;TRANDATE) injection 5 mg  5 mg IntraVENous Q10MIN PRN Wali Araya MD           Past Medical History:   Diagnosis Date    Anemia     At[facility] fibrillation [facility] [address] [facility]     BPH (benign prostatic hypertrophy)     CAD (coronary artery disease)     CHF (congestive heart failure) ([facility] [facility]     Diabetes ([facility] [address]     Gout     Hyperlipidemia     Hypertension     Pacemaker     Vitamin D deficiency        Past Surgical History:   Procedure Laterality Date    COLONOSCOPY N/A 4/3/2017    COLONOSCOPY performed by Aliya Grady MD at [facility] ENDOSCOPY    HX OTHER SURGICAL      colonoscopy    HX PACEMAKER      defibrillator    HX PACEMAKER PLACEMENT      HX TONSILLECTOMY         Allergies   Allergen Reactions    Tradjenta [Linagliptin] Anaphylaxis    Ace Inhibitors Angioedema       Patient Active Problem List   Diagnosis Code    Hypertension I10    Diabetes ([address] E11.9    Hyperlipidemia E78.5    Gout M10.9    Pacemaker Z95.0    Vitamin D deficiency E55.9    CHF (congestive heart failure) (HCC) I50.9    BPH (benign prostatic hypertrophy) N40.0    Anemia D64.9    Atrial fibrillation (HCC) I48.91    GI bleed K92.2    Symptomatic anemia D64.9    Rectal bleed K62.5    Acute gastric ulcer with hemorrhage K25.0    Dieulafoy lesion (hemorrhagic) of stomach and duodenum (CODE) K31.82    Aphasia R47.01         Review of Systems:   As above otherwise 11 point review of systems negative including;   Constitutional no fever or chills  Skin denies rash or itching  HENT  Denies tinnitus, hearing lose  Eyes denies diplopia vision lose  Respiratory denies shortness of breath  Cardiovascular denies chest pain, dyspnea on exertion  Gastrointestinal denies nausea, vomiting, diarrhea, constipation  Genitourinary denies incontinence  Musculoskeletal denies joint pain or swelling  Endocrine denies weight change  Hematology denies easy bruising or bleeding   Neurological as above in HPI      PHYSICAL EXAMINATION:      VITAL SIGNS:    Visit Vitals    /77 (BP 1 Location: Left arm, BP Patient Position: Supine)    Pulse 74    Temp 98.5 °F (36.9 °C)    Resp 19    Ht 5' 8\" (1.727 m)    Wt 99.3 kg (219 lb)    SpO2 100%    BMI 33.3 kg/m2       GENERAL: The patient is well developed, well nourished, and in no apparent distress. EXTREMITIES: No clubbing, cyanosis, or edema is identified.   Pulses 2+ and symmetrical.  Muscle tone is normal.  HEAD:   Ear, nose, and throat appear to be without trauma. The patient is normocephalic. NEUROLOGIC EXAMINATION    MENTAL STATUS: The patient is awake, alert, and oriented x 4. Fund of knowledge is adequate. Speech is fluent and memory appears to be intact, both long and short term. CRANIAL NERVES: II - Visual fields are full to confrontation. Funduscopic examination reveals flat disks bilaterally. Pupils are both 3 mm and briskly reactive to light and accommodation. III, IV, VI - Extraocular movements are intact and there is no nystagmus. V - Facial sensation is intact to pinprick and light touch. VII - Face is symmetrical.   VIII - Hearing is present. IX, X, XII- Palate rises symmetrically. Gag is present. Tongue is in the midline. XI - Shoulder shrugging and head turning intact  MOTOR:  The patient is 5/5 in all four limbs without any drift. Fine finger movements are symmetrical.  Isolated motor group testing reveals no focal abnormalities. Tone is normal.  Sensory examination is intact to pinprick, light touch and position sense testing. Reflexes are 2+ and symmetrical. Plantars are down going. Cerebellar examination reveals no gross ataxia or dysmetria. Gait is normal and the patient can tandem walk without any difficulty. Final result (Exam End: 10/20/2017  1:57 AM) Open    Study Result   CT OF THE HEAD WITHOUT CONTRAST.     CLINICAL HISTORY: CODE S.  Slurred speech.     TECHNIQUE: Helical scan obtained of the head were obtained from the skull vertex  through the base of the skull without intravenous contrast.    All CT scans are  performed using dose optimization techniques as appropriate to the performed  exam including the following: Automated exposure control, adjustment of mA  and/or kV according to patient size, and use of iterative reconstructive  technique.      COMPARISON: None.     FINDINGS:      The sulcal pattern and ventricular system are normal in size and configuration  for age. No intracranial hemorrhage. Mild periventricular white matter  hypodensities bilaterally. No mass effect. The visualized paranasal sinuses are  clear. The mastoid air cells are clear. 12 x 4 mm lucent bone lesion in right  lateral calvarium with absent outer table cortex and no adjacent soft tissue  mass, indeterminate.     IMPRESSION  IMPRESSION:      No acute hemorrhage or acute findings. Mild nonspecific white matter changes typically attributed to chronic  microvascular disease. Indeterminate right calvarial bone lesion. Recommend follow-up for stability. I have reviewed the above imagines myself. CBC:   Lab Results   Component Value Date/Time    WBC 5.7 10/20/2017 01:29 AM    RBC 2.87 10/20/2017 01:29 AM    HGB 7.8 10/20/2017 01:29 AM    HCT 24.7 10/20/2017 01:29 AM    PLATELET 582 75/14/6833 01:29 AM     BMP:   Lab Results   Component Value Date/Time    Glucose 55 10/20/2017 01:29 AM    Sodium 140 10/20/2017 01:29 AM    Potassium 3.6 10/20/2017 01:29 AM    Chloride 101 10/20/2017 01:29 AM    CO2 31 10/20/2017 01:29 AM    BUN 31 10/20/2017 01:29 AM    Creatinine 1.67 10/20/2017 01:29 AM    Calcium 8.9 10/20/2017 01:29 AM     CMP:   Lab Results   Component Value Date/Time    Glucose 55 10/20/2017 01:29 AM    Sodium 140 10/20/2017 01:29 AM    Potassium 3.6 10/20/2017 01:29 AM    Chloride 101 10/20/2017 01:29 AM    CO2 31 10/20/2017 01:29 AM    BUN 31 10/20/2017 01:29 AM    Creatinine 1.67 10/20/2017 01:29 AM    Calcium 8.9 10/20/2017 01:29 AM    Anion gap 8 10/20/2017 01:29 AM    BUN/Creatinine ratio 19 10/20/2017 01:29 AM    Alk.  phosphatase 47 10/10/2017 04:48 AM    Protein, total 5.0 10/10/2017 04:48 AM    Albumin 2.5 10/10/2017 04:48 AM    Globulin 2.5 10/10/2017 04:48 AM    A-G Ratio 1.0 10/10/2017 04:48 AM     Coagulation:   Lab Results   Component Value Date/Time    Prothrombin time 13.0 10/20/2017 01:29 AM    INR 1.0 10/20/2017 01:29 AM aPTT 32.0 10/20/2017 01:29 AM     Cardiac markers:   Lab Results   Component Value Date/Time     10/20/2017 01:29 AM    CK-MB Index  10/20/2017 01:29 AM     CALCULATION NOT PERFORMED WHEN RESULT IS BELOW LINEAR LIMIT          Impression: TIA referable to the left hemisphere in this man who has risk factors including atrial fibrillation off coumadin at this time. The biggest problem we have at this time is that while we know how to treat his atrial fibrillation related TIA, is just had a significant GI bleed they put him back on any anticoagulants rather dangerous. This places us in a no win situation for this unfortunate gentleman. I did spend some time discussing with him and his family. Plan: Await repeat CT scan results to make sure there is no hemorrhage and no sign of stroke. For now antiplatelet therapy if okay with GI service. Wonder when we will be able to restart him on anticoagulation therapy. PLEASE NOTE:   This document has been produced using voice recognition software. Unrecognized errors in transcription may be present.

## 2017-10-20 NOTE — PROGRESS NOTES
Dysphagia eval completed with recs of reg solid diet and thin liquids, meds as tolerated. Full report to follow.      Thank you for this referral.    Kassy Catalan M.S. CCC-SLP/L  Speech-Language Pathologist

## 2017-10-20 NOTE — PROGRESS NOTES
Problem: Dysphagia (Adult)  Goal: *Acute Goals and Plan of Care (Insert Text)  Patient will:  1. Tolerate PO trials with 0 s/s overt distress in 4/5 trials  2. Utilize compensatory swallow strategies/maneuvers (decrease bite/sip, size/rate, alt. liq/sol) with min cues in 4/5 trials    Rec:     Reg solid with thin liquids  Aspiration precautions  HOB >45 during po intake, remain >30 for 30-45 minutes after po   Small bites/sips; alternate liquid/solid with slow feeding rate   Oral care TID  Meds per pt preference    Speech LAnguage Pathology bedside swallow evaluation    Patient: Sourav Sow (7 y.o. male)  Date: 10/20/2017  Primary Diagnosis: Aphasia  Aphasia        Precautions: aspiration       ASSESSMENT :  Based on the objective data described below, the patient presents with oropharyngeal swallow fxn essentially WFL. Strength, ROM, and coordination of the orofacial musculature were all found to be [facility]. All structures were intact and symmetrical. The pt presented with adequate oral transit times on all consistencies. Mastication time was appropriate. No s/sx of aspiration noted; hyolaryngeal elevation and excursion appeared adequate on all consistencies. No oral stasis noted post swallow. The pt denied globus sensation post swallow. Speech production was fluent. No dysarthria was observed. Expressive/receptive speech production appeared WFL to informal observation. Pt communicated in sentences of appropriate form, content, and use. ST will continue to follow x 1-2 visits to ensure safety of diet tolerance. Patient will benefit from skilled intervention to address the above impairments.   Patients rehabilitation potential is considered to be Good  Factors which may influence rehabilitation potential include:   [x]            None noted     PLAN :  Recommendations and Planned Interventions: See above  Frequency/Duration: Patient will be followed by speech-language pathology x 1-2 more visits to address goals.  Discharge Recommendations: Home Health, Outpatient and To Be Determined     SUBJECTIVE:   Patient stated I'm feeling pretty good today. OBJECTIVE:     Past Medical History:   Diagnosis Date    Anemia     Atrial fibrillation (HCC)     BPH (benign prostatic hypertrophy)     CAD (coronary artery disease)     CHF (congestive heart failure) (HCC)     Diabetes (HCC)     Gout     Hyperlipidemia     Hypertension     Pacemaker     Vitamin D deficiency      Past Surgical History:   Procedure Laterality Date    COLONOSCOPY N/A 4/3/2017    COLONOSCOPY performed by Carie Ray MD at [facility]COPY    HX OTHER SURGICAL      colonoscopy    HX PACEMAKER      defibrillator    HX PACEMAKER PLACEMENT      HX TONSILLECTOMY       Prior Level of Function/Home Situation: private residence  Home Situation  Home Environment: Private residence  # Steps to Enter: 2  One/Two Story Residence: Two story, live on 1st floor  Living Alone: No  Support Systems: Family member(s)  Patient Expects to be Discharged to[de-identified] Private residence  Current DME Used/Available at Home: None  Diet prior to admission: reg with thin  Current Diet:  Reg with thin   Cognitive and Communication Status:  Neurologic State: Anesthetized  Orientation Level: Oriented X4  Cognition: Follows commands  Oral Assessment:  Oral Assessment  Labial: No impairment  Dentition: Natural;Intact  Oral Hygiene: Adequate  Lingual: No impairment  Velum: No impairment  Mandible: No impairment  P.O. Trials:  Patient Position: 60 at [facility]  Vocal quality prior to P.O.: No impairment  Consistency Presented: Thin liquid; Solid;Puree  How Presented: Self-fed/presented;Cup/sip;Spoon;Straw  Bolus Acceptance: No impairment  Bolus Formation/Control: No impairment  Propulsion: No impairment  Oral Residue: None  Initiation of Swallow: No impairment  Laryngeal Elevation: Functional  Aspiration Signs/Symptoms: None  Pharyngeal Phase Characteristics: No impairment, issues, or problems   Effective Modifications: None  Cues for Modifications: None     Oral Phase Severity: No impairment  Pharyngeal Phase Severity : No impairment    GCODESwallowing:  Swallow Current Status CI= 1-19%   Swallow Goal Status CH= 0%    The severity rating is based on the following outcomes:    Clinical Judgment    Pain:  Pt c/o 0/10 pain prior to evaluation. Pt c/o 0/10 pain post evaluation. After treatment:   []            Patient left in no apparent distress sitting up in chair  [x]            Patient left in no apparent distress in bed  [x]            Call bell left within reach  [x]            Nursing notified  []            Caregiver present  []            Bed alarm activated    COMMUNICATION/EDUCATION:   [x]            SLP educated pt with regard to compensatory swallow strategies and       aspiration/reflux precautions including: small bites/sips,       alternate liquids/solids, decrease feeding rate, HOB > 45 with all po, and         upright in bed at 30 degrees after po for at least 45 minutes. [x]            Patient/family have participated as able in goal setting and plan of care.     Thank you for this referral.    Garland Shafer M.S. CCC-SLP/L  Speech-Language Pathologist

## 2017-10-21 ENCOUNTER — HOME HEALTH ADMISSION (OUTPATIENT)
Dept: HOME HEALTH SERVICES | Facility: HOME HEALTH | Age: 72
End: 2017-10-21

## 2017-10-21 VITALS
WEIGHT: 218.95 LBS | DIASTOLIC BLOOD PRESSURE: 78 MMHG | BODY MASS INDEX: 33.18 KG/M2 | OXYGEN SATURATION: 99 % | HEART RATE: 77 BPM | HEIGHT: 68 IN | TEMPERATURE: 98.9 F | SYSTOLIC BLOOD PRESSURE: 125 MMHG | RESPIRATION RATE: 18 BRPM

## 2017-10-21 LAB
CHOLEST SERPL-MCNC: 145 MG/DL
EST. AVERAGE GLUCOSE BLD GHB EST-MCNC: 103 MG/DL
FOLATE SERPL-MCNC: >20 NG/ML (ref 3.1–17.5)
GLUCOSE BLD STRIP.AUTO-MCNC: 118 MG/DL (ref 70–110)
GLUCOSE BLD STRIP.AUTO-MCNC: 126 MG/DL (ref 70–110)
GLUCOSE BLD STRIP.AUTO-MCNC: 185 MG/DL (ref 70–110)
HBA1C MFR BLD: 5.2 % (ref 4.2–5.6)
HDLC SERPL-MCNC: 49 MG/DL (ref 40–60)
HDLC SERPL: 3 {RATIO} (ref 0–5)
IRON SATN MFR SERPL: 17 %
IRON SERPL-MCNC: 50 UG/DL (ref 50–175)
LDLC SERPL CALC-MCNC: 83.6 MG/DL (ref 0–100)
LIPID PROFILE,FLP: NORMAL
TIBC SERPL-MCNC: 302 UG/DL (ref 250–450)
TRIGL SERPL-MCNC: 62 MG/DL (ref ?–150)
VIT B12 SERPL-MCNC: >2000 PG/ML (ref 211–911)
VLDLC SERPL CALC-MCNC: 12.4 MG/DL

## 2017-10-21 PROCEDURE — 83540 ASSAY OF IRON: CPT | Performed by: HOSPITALIST

## 2017-10-21 PROCEDURE — 74011636637 HC RX REV CODE- 636/637: Performed by: FAMILY MEDICINE

## 2017-10-21 PROCEDURE — 82962 GLUCOSE BLOOD TEST: CPT

## 2017-10-21 PROCEDURE — 36415 COLL VENOUS BLD VENIPUNCTURE: CPT | Performed by: HOSPITALIST

## 2017-10-21 PROCEDURE — 82746 ASSAY OF FOLIC ACID SERUM: CPT | Performed by: HOSPITALIST

## 2017-10-21 PROCEDURE — 97165 OT EVAL LOW COMPLEX 30 MIN: CPT

## 2017-10-21 PROCEDURE — 83036 HEMOGLOBIN GLYCOSYLATED A1C: CPT | Performed by: HOSPITALIST

## 2017-10-21 PROCEDURE — 80061 LIPID PANEL: CPT | Performed by: HOSPITALIST

## 2017-10-21 PROCEDURE — 74011250637 HC RX REV CODE- 250/637: Performed by: FAMILY MEDICINE

## 2017-10-21 RX ORDER — CLOPIDOGREL BISULFATE 75 MG/1
75 TABLET ORAL DAILY
Qty: 30 TAB | Refills: 1 | Status: SHIPPED | OUTPATIENT
Start: 2017-10-22 | End: 2018-04-24

## 2017-10-21 RX ORDER — ALBUTEROL SULFATE 90 UG/1
2 AEROSOL, METERED RESPIRATORY (INHALATION)
COMMUNITY
End: 2018-04-24

## 2017-10-21 RX ORDER — ATORVASTATIN CALCIUM 80 MG/1
80 TABLET, FILM COATED ORAL
Qty: 30 TAB | Refills: 1 | Status: SHIPPED | OUTPATIENT
Start: 2017-10-21 | End: 2018-04-24

## 2017-10-21 RX ORDER — DIGOXIN 250 MCG
0.25 TABLET ORAL DAILY
Qty: 30 TAB | Refills: 1 | Status: SHIPPED | OUTPATIENT
Start: 2017-10-22 | End: 2018-04-24

## 2017-10-21 RX ORDER — GLIMEPIRIDE 4 MG/1
4 TABLET ORAL
Qty: 1 TAB | Refills: 0 | Status: SHIPPED
Start: 2017-10-21 | End: 2018-04-24

## 2017-10-21 RX ORDER — WARFARIN 10 MG/1
10 TABLET ORAL DAILY
COMMUNITY
End: 2017-10-21

## 2017-10-21 RX ORDER — GLIMEPIRIDE 4 MG/1
4 TABLET ORAL
COMMUNITY
End: 2018-04-24

## 2017-10-21 RX ORDER — SIMVASTATIN 40 MG/1
40 TABLET, FILM COATED ORAL
COMMUNITY
End: 2017-10-21

## 2017-10-21 RX ADMIN — Medication 10 ML: at 07:26

## 2017-10-21 RX ADMIN — INSULIN LISPRO 2 UNITS: 100 INJECTION, SOLUTION INTRAVENOUS; SUBCUTANEOUS at 12:29

## 2017-10-21 RX ADMIN — CLOPIDOGREL BISULFATE 75 MG: 75 TABLET ORAL at 09:27

## 2017-10-21 RX ADMIN — EZETIMIBE 10 MG: 10 TABLET ORAL at 09:27

## 2017-10-21 RX ADMIN — Medication 10 ML: at 16:44

## 2017-10-21 RX ADMIN — DIGOXIN 0.25 MG: 0.25 TABLET ORAL at 09:27

## 2017-10-21 RX ADMIN — THERA TABS 1 TABLET: TAB at 09:27

## 2017-10-21 NOTE — ROUTINE PROCESS
Provided pt with stroke education binder and answered questions from pt. Patient stated he understood and said I don't have any questions. \"

## 2017-10-21 NOTE — ROUTINE PROCESS
Bedside, Verbal and Written shift change report given to LOUISE Santana RN (oncoming nurse) by [employer]). Report included the following information SBAR, Kardex, and MAR. Hourly rounding and  chart checks completed.

## 2017-10-21 NOTE — PROGRESS NOTES
Slurred speech and weakness all over     S: Feels well. Daughter in the room. She says his speech is normal.    O: He speaks clearly and has no problem walking steadily. Second cranial CT (no MRI due to defibrillator) shows \"probable left corona radiata acute infarct\" and right caudate lacune. I reviewed the images; these are subtle findings. A: Possible/probable left corona radiata infarct off Coumadin (due to bleeding.) Symptoms have resolved. Rec: They did not know the difference between an AP agent and an anti-coagulant. I explained this. I recommended that he keep his appointment with his cardiologist for the 24th and continue with Plavix in place of Coumadin. Return if he has bleeding or stroke sxs. He can be discharged.     Kimber House MD

## 2017-10-21 NOTE — DISCHARGE INSTRUCTIONS
Patient armband removed and shredded    MyChart Activation    Thank you for requesting access to [employer]. Please follow the instructions below to securely access and download your online medical record. [employer] allows you to send messages to your doctor, view your test results, renew your prescriptions, schedule appointments, and more. How Do I Sign Up? 1. In your internet browser, go to [email or web address]  2. Click on the First Time User? Click Here link in the Sign In box. You will be redirect to the New Member Sign Up page. 3. Enter your [employer] Access Code exactly as it appears below. You will not need to use this code after youve completed the sign-up process. If you do not sign up before the expiration date, you must request a new code. [employer] Access Code: [email or web address]  Expires: 2018  4:21 PM (This is the date your [employer] access code will )    4. Enter the last four digits of your Social Security Number (xxxx) and Date of Birth (mm/dd/yyyy) as indicated and click Submit. You will be taken to the next sign-up page. 5. Create a [employer] ID. This will be your [employer] login ID and cannot be changed, so think of one that is secure and easy to remember. 6. Create a [employer] password. You can change your password at any time. 7. Enter your Password Reset Question and Answer. This can be used at a later time if you forget your password. 8. Enter your e-mail address. You will receive e-mail notification when new information is available in [address]. 9. Click Sign Up. You can now view and download portions of your medical record. 10. Click the Download Summary menu link to download a portable copy of your medical information. Additional Information    If you have questions, please visit the Frequently Asked Questions section of the [employer] website at [email or web address] Remember, [employer] is NOT to be used for urgent needs.  For medical emergencies, dial 911.             Learning About Aphasia  What is aphasia? Aphasia is the loss of communication skills. Aphasia may affect how well a person can speak, read, write, and understand language. Some people may not be able to read, write, or express their thoughts in words. Or they may not understand written or spoken words. The most common cause of aphasia is a stroke. A stroke can damage the left side of the brain. This is the side of the brain that handles language. When a person can't speak or write, it's called nonfluent or expressive aphasia. When a person can't understand written or spoken words, it's called fluent or receptive aphasia. How can you manage aphasia? Sometimes, other parts of the brain take over for the damaged parts. Many people get back some of their skills. But some people have lasting problems. A speech-language pathologist can help some people relearn lost skills. Getting support  It's common to feel sad and hopeless when you have aphasia. It's important to let caregivers know about these feelings. It's also important to get treatment for depression if needed. A strong support network of family and friends is very important. They can help with daily tasks and treatment. The American Stroke Association and the National Stroke Association may offer local support groups. You can also find resources and information at the [place] on Deafness and Other Communication Disorders. What can caregivers do? Here are some ways caregivers can help:  · Support and encourage your loved one to take part in a rehab program.  · Visit and talk with your loved one often. · Take part in education programs, and attend rehab sessions. · Help your loved one learn and practice new skills. Communication problems can be very frustrating. Be patient, understanding, and supportive. Here are some tips:  · Speak directly to your loved one. Keep eye contact.   · Speak slowly and simply. Use your normal tone and volume. · Give your loved one enough time to answer. · Focus on what the person is saying. Don't focus on how he or she is saying it. · Don't fill in words unless you are asked. · Ask the person to repeat something if you do not understand. · Limit conversations to small groups or one on one. Large group conversations may be hard for your loved one to follow. Follow-up care is a key part of your treatment and safety. Be sure to make and go to all appointments, and call your doctor if you are having problems. It's also a good idea to know your test results and keep a list of the medicines you take. Where can you learn more? Go to http://yaw-junie.info/. Enter Y726 in the search box to learn more about \"Learning About Aphasia. \"  Current as of: March 20, 2017  Content Version: 11.3  © 0061-3698 [employer]. Care instructions adapted under license by [employer] (which disclaims liability or warranty for this information). If you have questions about a medical condition or this instruction, always ask your healthcare professional. [place] [address] any warranty or liability for your use of this information.     DISCHARGE SUMMARY from Nurse    The following personal items are in your possession at time of discharge:    Dental Appliances: None  Visual Aid: Glasses, With patient     Home Medications: None  Jewelry: Ring  Clothing: Belt, Footwear, Pants, Shirt, Socks, Undergarments  Other Valuables: Cell Phone             PATIENT INSTRUCTIONS:    After general anesthesia or intravenous sedation, for 24 hours or while taking prescription Narcotics:  · Limit your activities  · Do not drive and operate hazardous machinery  · Do not make important personal or business decisions  · Do  not drink alcoholic beverages  · If you have not urinated within 8 hours after discharge, please contact your surgeon on call.    Report the following to your surgeon:  · Excessive pain, swelling, redness or odor of or around the surgical area  · Temperature over 100.5  · Nausea and vomiting lasting longer than 4 hours or if unable to take medications  · Any signs of decreased circulation or nerve impairment to extremity: change in color, persistent  numbness, tingling, coldness or increase pain  · Any questions        What to do at Home:  Recommended activity: Activity as tolerated,     If you experience any of the following symptoms slurred speech, vision changes to include blurred vision, weakness in extremities, facial drooping, please follow up with 911. *  Please give a list of your current medications to your Primary Care Provider. *  Please update this list whenever your medications are discontinued, doses are      changed, or new medications (including over-the-counter products) are added. *  Please carry medication information at all times in case of emergency situations. These are general instructions for a healthy lifestyle:    No smoking/ No tobacco products/ Avoid exposure to second hand smoke    Surgeon General's Warning:  Quitting smoking now greatly reduces serious risk to your health. Obesity, smoking, and sedentary lifestyle greatly increases your risk for illness    A healthy diet, regular physical exercise & weight monitoring are important for maintaining a healthy lifestyle    You may be retaining fluid if you have a history of heart failure or if you experience any of the following symptoms:  Weight gain of 3 pounds or more overnight or 5 pounds in a week, increased swelling in our hands or feet or shortness of breath while lying flat in bed. Please call your doctor as soon as you notice any of these symptoms; do not wait until your next office visit.     Recognize signs and symptoms of STROKE:    F-face looks uneven    A-arms unable to move or move unevenly    S-speech slurred or non-existent    T-time-call 911 as soon as signs and symptoms begin-DO NOT go       Back to bed or wait to see if you get better-TIME IS BRAIN. Warning Signs of HEART ATTACK     Call 911 if you have these symptoms:   Chest discomfort. Most heart attacks involve discomfort in the center of the chest that lasts more than a few minutes, or that goes away and comes back. It can feel like uncomfortable pressure, squeezing, fullness, or pain.  Discomfort in other areas of the upper body. Symptoms can include pain or discomfort in one or both arms, the back, neck, jaw, or stomach.  Shortness of breath with or without chest discomfort.  Other signs may include breaking out in a cold sweat, nausea, or lightheadedness. Don't wait more than five minutes to call 911 - MINUTES MATTER! Fast action can save your life. Calling 911 is almost always the fastest way to get lifesaving treatment. Emergency Medical Services staff can begin treatment when they arrive -- up to an hour sooner than if someone gets to the hospital by car. The discharge information has been reviewed with the patient. The patient verbalized understanding. Discharge medications reviewed with the patient and appropriate educational materials and side effects teaching were provided.

## 2017-10-21 NOTE — DISCHARGE SUMMARY
[address]  [address] [place] [place] SUMMARY    Name:  Jhonathan Haynes  MR#:  [id number]  :  1945  Account #:  [de-identified]  Date of Adm:  10/20/2017  Date of Discharge:  10/21/2017      DISCHARGE DIAGNOSES  1. Acute cerebrovascular accident. 2. Hypertension. 3. Diabetes. 4. Hyperlipidemia. 5. Chronic systolic congestive heart failure. 6. Paroxysmal atrial fibrillation with a history of pacemaker  implantation. 7. Chronic iron-deficiency anemia. SERVICE: The patient was admitted to the hospitalist service. CONSULTS: Dr. Sagrario Andujar was consulted for Neurology. PROCEDURES: None. RELEVANT STUDIES: Included a CT of his head on  10/17/2017 showing no acute hemorrhage or acute findings. Mild  nonspecific white matter changes, typically attributed to chronic  microvascular disease. Indeterminate right calvarial bone lesion. Recommend followup for stability. Repeat CT of the head performed approximately 12 hours later  demonstrated no evidence of intracranial hemorrhages. Findings  suggestive of acute lacunar infarction in the anterior limb, right internal  capsule. Probable acute lacunar infarction in the anterior portion of left  corona radiata. 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 In neck in bilateral  carotid artery systems or in bilateral vertebral arteries grossly there is  no obvious compromise or critical stenosis. The major cerebral arteries  were patent unopacified without any critical stenosis. The distal  branches of cerebral artery. Nares are not well visualized as described.     HISTORY OF PRESENT ILLNESS: Please refer to my admission H  and P.    Briefly, the patient, which is a 75-year-old gentleman with a history  significant for the above, who came to the emergency department with  complaints of slurred speech and difficulty with word finding. Recently,  he was taken off Coumadin and temporarily held from aspirin due to  upper gastrointestinal bleed, and on waking up on the morning of  presentation, he had difficulty forming words and noted slurred speech. No weakness. No numbness. No other complaints. He was at baseline  was seen with his 2 daughters and his wife at bedside when I saw him. He had no other complaints. ALLERGIES: HAS NOTABLE DRUG ALLERGIES TO  1. ACE INHIBITORS. 2. LINAGLIPTIN. His medical history again is significant for recent hospitalization for  upper gastrointestinal bleed with upper endoscopy in October of this  year showing gastritis with bleeding. PHYSICAL EXAMINATION  VITAL SIGNS: When I saw him were stable and normal.  GENERAL: The patient appeared to be in no distress. HEART: He had a regular heart. LUNGS: Clear. ABDOMEN: Benign. EXTREMITIES: No calf tenderness or edema. NEUROLOGIC: No neurological deficits were seen on examination. LABORATORY DATA: Metabolic panel normal with a BUN and  creatinine of 31/1.7. CBC normal with an hemoglobin and hematocrit of  7.8/24.7. IMAGING: As per above. HOSPITAL COURSE BY PROBLEM  1. Acute cerebrovascular accident: Maintain on CVA protocol. Permissive hypertension. Maintained on Zetia and statin therapy. Case  was discussed with Neurology. Given his recent GI bleed history, there  were some caution in terms of what we should administer for  secondary stroke prevention. Decision was made to maintain the  patient on Plavix alone. No other acute issues at the current time. No  recurrent symptoms. CT findings are as above. Cleared by Neurology  for discharge. Maintained on statin therapy. 2. Hypertension: Permissive for the above. No other acute issues. Continue outpatient medicines as listed below.  His pressures have  been normal, despite holding his medications. 3. Hyperlipidemia: Maintained on statin therapy. We did not have a  complete medication list, however, review of the Care Everywhere system showed  recent medicines. Will maintain him on Zetia as well as Lipitor. 4. Chronic systolic congestive heart failure: Based on echocardiogram  from 2015, he had an ejection fraction of 45% back then. He has been  compensated. He had a little bit of an elevation in terms of his  creatinine, we held his diuretics on admission. He can resume his  home medications on discharge. Outpatient followup. Would  recommend a repeat metabolic panel on followup. 5. Per the above, he again taken off on aspirin. Maintained on Plavix  alone. Concern for his history of GI bleed. He is on beta blocker and  ARB therapy on discharge. 6. Paroxysmal atrial fibrillation with a history of pacer. No acute issues. No Coumadin due to history of bleeding. Maintained on digoxin level. We checked digoxin level, and this came back at 0.6 on review of his  labs from 10/06/2017. I have increased his digoxin to 0.25 mcg. Outpatient followup. 7. Chronic iron-deficiency anemia: Reported history of same. He  received IV Venofer with his recent hospitalization. His hemoglobin and  hematocrit have been stable here. Outpatient followup. On examination today, vital signs are stable and normal. The patient  denies any fevers, chills, nausea, vomiting, chest pain, shortness of  breath, palpitations or edema. He would like to go home. He has no weakness. Ambulates without any difficulty. No difficulty  swallowing. No change in speech. Seen with his wife, the patient is at  baseline. He has regular heart, clear lungs, benign abdomen. No calf tenderness  or edema. LABORATORY DATA: Reviewed. He B12 level greater than 8701, folic  acid level of greater than 20. Hemoglobin A1c last at 5.2%. Iron level of  50.  , iron saturation 17%.    DISPOSITION: The patient will be discharged home. MEDICATIONS ON DISCHARGE  NEW MEDICINES AS FOLLOWS  1. Lipitor 80 mg p.o. every night. 2. Plavix 75 mg p.o. daily. OTHERWISE, RESUME THE FOLLOWING  1. Allopurinol 300 mg half tab p.o. daily. 2. Coreg 25 mg p.o. b.i.d.  3. Proscar 5 mg p.o. daily. 4. Lasix 40 mg p.o. daily. 5. Cozaar 100 mg p.o. daily. 6. Metformin 1000 mg p.o. b.i.d.  7. Multivitamin p.o. daily. 8. Protonix 40 mg p.o. b.i.d.  9. Aldactone 25 mg p.o. daily. 10. Vitamin B12 1000 mcg p.o. daily. 11. Vitamin C 1000 mg p.o. daily. 12. Vitamin D3 1000 units p.o. daily. 13. Zetia 10 mg p.o. daily. 14. Discontinue Aspirin 81 mg. 15. We have also removed Biaxin and amoxicillin, as he has not taken  these medications. Furthermore, he has been instructed to resume his albuterol MDI 2  puffs every 4 hours, and also his Amaryl 4 mg every morning. Finally, again, new prescription was written. He is to increase his  digoxin dose to 0.25 mg daily. FOLLOWUP: With his PCP, Dr. Vandy Crigler in 7-10 days. With Neurology in 3-4 weeks. Recommend repeat metabolic panel to follow renal function at the time  of up with primary care physician. Total time of discharge greater than 30 minutes.         Krzysztof Jerez MD PP / KISHA  D:  10/21/2017   15:53  T:  10/21/2017   16:53  Job #:  [id number]

## 2017-10-21 NOTE — PROGRESS NOTES
Problem: Self Care Deficits Care Plan (Adult)  Goal: *Acute Goals and Plan of Care (Insert Text)  Occupational Therapy EVALUATION/discharge    Patient: Rodney Fitch (75 y.o. male)  Date: 10/21/2017  Primary Diagnosis: Aphasia  Aphasia   Precautions: non listed      ASSESSMENT AND RECOMMENDATIONS:  Based on the objective data described below, the patient presents with out functional deficits; therefore, skilled occupational therapy is not indicated at this time. Discharge Recommendations: None  Further Equipment Recommendations for Discharge: N/A      Barriers to Learning/Limitations: yes;  decreased STM  Compensate with: repetition and his family present during instructions    COMPLEXITY     Eval Complexity: History: LOW Complexity : Brief history review ; Examination: LOW Complexity : 1-3 performance deficits relating to physical, cognitive , or psychosocial skils that result in activity limitations and / or participation restrictions ; Decision Making:LOW Complexity : No comorbidities that affect functional and no verbal or physical assistance needed to complete eval tasks  Assessment: LOW Complexity        G-CODES:     Self Care  Current  CN= 100%   Goal  CN= 100%   D/C  CN= 100%. The severity rating is based on the Level of Assistance required for Functional Mobility and ADLs. SUBJECTIVE:   Patient stated I can do for myself.     OBJECTIVE DATA SUMMARY:     Past Medical History:   Diagnosis Date    Anemia     [facility]rial fibrillation ([facility]     BPH (benign prostatic hypertrophy)     CAD (coronary artery disease)     CHF (congestive heart failure) (HCC)     Diabetes ([facility]     Gout     Hyperlipidemia     Hypertension     Pacemaker     Vitamin D deficiency      Past Surgical History:   Procedure Laterality Date    COLONOSCOPY N/A 4/3/2017    COLONOSCOPY performed by Chang Townsend MD at [facility]OSCOPY    HX OTHER SURGICAL      colonoscopy    HX PACEMAKER      defibrillator    HX PACEMAKER PLACEMENT      HX TONSILLECTOMY       Prior Level of Function/Home Situation: he reports he was independent prior to this admission  Home Situation  Home Environment: Private residence  # Steps to Enter: 2  One/Two Story Residence: Two story, live on 1st floor  Living Alone: No  Support Systems: Family member(s)  Patient Expects to be Discharged to[de-identified] Private residence  Current DME Used/Available at Home: None  [x]     Right hand dominant   []     Left hand dominant  Cognitive/Behavioral Status:  Neurologic State: Alert  Orientation Level: Oriented X4  Minimally decreased STM and he reports his family has been assisting him with that and going to medical appointments with him  Skin: no skin integrity issues noted during OT evaluation  Edema: no extremity edema noted  Vision/Perceptual:     tracking and convergence/divergence are [place]     Coordination:   BUE's: WFL   Balance:   Independent standing during LB clothes management tasks  Strength:  BUE's: 4 to 4+/5 and symmetrical   Tone & Sensation:  BUE's WFL   Range of Motion:  BUE's AROM is [place]   Functional Mobility and Transfers for ADLs:  Bed Mobility:   sitting on the edge of the bed upon arrival; based on his self report and his functional mobility in upright, do not anticipate any deficits   Transfers:   Independent   ADL Assessment:   Self feeding: independent (simulated)  Grooming: independent (simulated)  UB bathing/dressing: independent (simulated)  LB bathing/dressing: independent  Pain:  Pt reports 0/10 pain or discomfort prior to treatment.    Pt reports 0/10 pain or discomfort post treatment. Activity Tolerance:   No SOB or c/o fatigue noted  Please refer to the flowsheet for vital signs taken during this treatment.   After treatment:   []  Patient left in no apparent distress sitting up in chair  [x]  Patient left in no apparent distress sitting on the edge of the bed  [x]  Call bell left within reach  []  Nursing notified  [x]  Family member present  []  Bed alarm activated    COMMUNICATION/EDUCATION:   Communication/Collaboration:  []      Home safety education was provided and the patient/caregiver indicated understanding. [x]      Patient/family have participated as able and agree with findings and recommendations. []      Patient is unable to participate in plan of care at this time.     Geraldine Jacobs, OTR/L  Time Calculation: 9 mins

## 2017-10-23 ENCOUNTER — PATIENT OUTREACH (OUTPATIENT)
Dept: CASE MANAGEMENT | Age: 72
End: 2017-10-23

## 2017-10-23 NOTE — HOME CARE
[facility], Discharge noted over the weekend, [facility] will follow, referral processed by [facility] central intake over the weekend. MALCOM HARDY.

## 2017-10-23 NOTE — PROGRESS NOTES
[facility] Discharge Follow-Up          Patient listed on discharge ([facility]) report on 10/23/17. Patient hospitalized at [facility] 10/20-10/21/17 for acute CVA. RRAT score: 21 High    Medical History:     Past Medical History:   Diagnosis Date    Anemia     Atrial fibrillation (HCC)     BPH (benign prostatic hypertrophy)     CAD (coronary artery disease)     CHF (congestive heart failure) (HCC)     Diabetes (HCC)     Gout     Hyperlipidemia     Hypertension     Pacemaker     Vitamin D deficiency        NN attempted to contact patient at listed. VM left identifying self, direct contact information given with request for return call. NN contacted patient's sister who reports that patient is home but probably \"outside since it is such a pretty day\". She requests that NN attempt to contact patient later today or tomorrow morning. NN contacted patient at listed number. HIPAA identifiers x 2. NN explained role of NN, reason for this outreach. Patient alert and oriented, states he was sitting outside today and missed call from [facility]. States that he does have appointment scheduled for Dr. Dung Khalil or Dr. Mcgrath First at this time but he does have appointment with cardiologist, Dr. Ignacio Nielsen on 10/24/17. Patient states that he did not get prescription for Lipitor because he can't take this medicine \"it did a job on me a while back and my heart doctor took me off of it\". Patient verbalized understanding of Plavix 75mg daily, changes in his Digoxin and Amaryl doses. Patient verbalized that he has discontinued Amoxicillin, 81mg aspirin, Biaxin, Zocor and Coumadin. NN instructed patient in bleeding precautions and low fat/low cholesterol diet. Patient verbaizes understanding. CarePlan:  NN will continue to follow patient as necessary until he has attended appointments with neurologist and PCP; until home health has started care.

## 2017-10-24 ENCOUNTER — HOME CARE VISIT (OUTPATIENT)
Dept: SCHEDULING | Facility: HOME HEALTH | Age: 72
End: 2017-10-24

## 2017-10-31 ENCOUNTER — PATIENT OUTREACH (OUTPATIENT)
Dept: CASE MANAGEMENT | Age: 72
End: 2017-10-31

## 2017-10-31 NOTE — PROGRESS NOTES
Follow up          Follow up Dx:  [facility] 10/20-10/21/17 for acute CVA. Nurse Navigator(NN) contacted the patient by telephone to perform follow up assessment. Verified  and address with patient as identifiers. Patient reports that he is doing \"fine\", was in yard \"trimming hedges when you called\". No deficits (slurred speech, difficulty understanding /following along with conversation) noted by NN. Patient denies headaches, changes in vision, weakness. Hospitalizations/ ED visits :  None    Medication:   Patient verbalizes understanding of administration of home medications. Patient remains on Plavix per order. There were no barriers to obtaining medications identified at this time. PCP/Specialist follow up:  Dr. Jorge Garcia on 17. Dr. Swapna Adam on     Case management plan Will continue to follow as necessary until patient has attended follow up appointments.

## 2017-11-10 ENCOUNTER — PATIENT OUTREACH (OUTPATIENT)
Dept: CASE MANAGEMENT | Age: 72
End: 2017-11-10

## 2017-11-10 NOTE — PROGRESS NOTES
[facility] Dx: [facility] 10/20-10/21/17 for acute CVA.     Nurse Navigator(RICARDO) contacted the patient by telephone to perform follow up assessment. Verified  and address with patient as identifiers. Patient reports that he is doing \"well\", denies headaches, blurred vision, weakness, difficulty swallowing or walking. Hospitalizations/ ED visits : None    Medication:   Patient reports that PCP called to inform him that he is \"a little\" anemic and ordered \"iron pill\", patient just returned from pharmacy picking up medication. He verbalizes understanding of administration of home medications. There were no barriers to obtaining medications identified at this time. PCP/Specialist follow up: Neurology 17    Case management plan Will continue to follow until patient has attended neurology appointment.

## 2017-11-22 ENCOUNTER — OFFICE VISIT (OUTPATIENT)
Dept: NEUROLOGY | Age: 72
End: 2017-11-22

## 2017-11-22 VITALS
DIASTOLIC BLOOD PRESSURE: 80 MMHG | HEIGHT: 68 IN | WEIGHT: 220 LBS | SYSTOLIC BLOOD PRESSURE: 140 MMHG | HEART RATE: 96 BPM | BODY MASS INDEX: 33.34 KG/M2 | OXYGEN SATURATION: 97 % | RESPIRATION RATE: 16 BRPM | TEMPERATURE: 97.9 F

## 2017-11-22 DIAGNOSIS — K62.5 RECTAL BLEED: ICD-10-CM

## 2017-11-22 DIAGNOSIS — I48.91 ATRIAL FIBRILLATION, UNSPECIFIED TYPE (HCC): ICD-10-CM

## 2017-11-22 DIAGNOSIS — Z95.0 PACEMAKER: ICD-10-CM

## 2017-11-22 DIAGNOSIS — G45.9 TRANSIENT CEREBRAL ISCHEMIA, UNSPECIFIED TYPE: Primary | ICD-10-CM

## 2017-11-22 DIAGNOSIS — I10 ESSENTIAL HYPERTENSION: ICD-10-CM

## 2017-11-22 NOTE — PATIENT INSTRUCTIONS
Transient Ischemic Attack: Care Instructions  Your Care Instructions    A transient ischemic attack (TIA) is when blood flow to a part of your brain is blocked for a short time. A TIA is like a stroke but usually lasts only a few minutes. A TIA does not cause lasting brain damage. Any vision problems, slurred speech, or other symptoms usually go away in 10 to 20 minutes. But they may last for up to 24 hours. TIAs are often warning signs of a stroke. Some people who have a TIA may have a stroke in the future. A stroke can cause symptoms like those of a TIA. But a stroke causes lasting damage to your brain. You can take steps to help prevent a stroke. One thing you can do is get early treatment. If you have other new symptoms, or if your symptoms do not get better, go back to the emergency room or call your doctor right away. Getting treatment right away may prevent long-term brain damage caused by a stroke. The doctor has checked you carefully, but problems can develop later. If you notice any problems or new symptoms, get medical treatment right away. Follow-up care is a key part of your treatment and safety. Be sure to make and go to all appointments, and call your doctor if you are having problems. It's also a good idea to know your test results and keep a list of the medicines you take. How can you care for yourself at home? Medicines  ? · Be safe with medicines. Take your medicines exactly as prescribed. Call your doctor if you think you are having a problem with your medicine. ? · If you take a blood thinner, such as aspirin, be sure you get instructions about how to take your medicine safely. Blood thinners can cause serious bleeding problems. ? · Call your doctor if you are not able to take your medicines for any reason.    ? · Do not take any over-the-counter medicines or herbal products without talking to your doctor first.   ? · If you take birth control pills or hormone therapy, talk to your doctor. Ask if these treatments are right for you. ? Lifestyle changes  ? · Do not smoke. If you need help quitting, talk to your doctor about stop-smoking programs and medicines. ? · Be active. If your doctor recommends it, get more exercise. Walking is a good choice. Bit by bit, increase the amount you walk every day. Try for at least 30 minutes on most days of the week. You also may want to swim, bike, or do other activities. ? · Eat heart-healthy foods. These include fruits, vegetables, high-fiber foods, fish, and foods that are low in sodium, saturated fat, and trans fat. ? · Stay at a healthy weight. Lose weight if you need to.   ? · Limit alcohol to 2 drinks a day for men and 1 drink a day for women. ?Staying healthy  ? · Manage other health problems such as diabetes, high blood pressure, and high cholesterol. ? · Get the flu vaccine every year. When should you call for help? Call 911 anytime you think you may need emergency care. For example, call if:  ? · You have new or worse symptoms of a stroke. These may include:  ¨ Sudden numbness, tingling, weakness, or loss of movement in your face, arm, or leg, especially on only one side of your body. ¨ Sudden vision changes. ¨ Sudden trouble speaking. ¨ Sudden confusion or trouble understanding simple statements. ¨ Sudden problems with walking or balance. ¨ A sudden, severe headache that is different from past headaches. Call 911 even if these symptoms go away in a few minutes. ? · You feel like you are having another TIA. ? Watch closely for changes in your health, and be sure to contact your doctor if you have any problems. Where can you learn more? Go to http://yaw-junie.info/. Enter ([phone] in the search box to learn more about \"Transient Ischemic Attack: Care Instructions. \"  Current as of: March 20, 2017  Content Version: 11.4  © 3339-1969 [employer], [employer].  Care instructions adapted under license by [employer] (which disclaims liability or warranty for this information). If you have questions about a medical condition or this instruction, always ask your healthcare professional. [address] any warranty or liability for your use of this information.

## 2017-11-22 NOTE — MR AVS SNAPSHOT
Visit Information Date & Time Provider Department Dept. Phone Encounter #  
 11/22/2017  9:00 AM Dimple Renteria MD [address] [place] [address] at [address] [place] [address] [id number] Follow-up Instructions Return if symptoms worsen or fail to improve. Follow-up and Disposition History Your Appointments 4/10/2018 10:00 AM  
Nurse Visit with LUC[facility] [address] ([facility]) Appt Note: PSA  
 [facility] [address] [id number]  
  
    
 4/24/2018 10:15 AM  
ESTABLISHED PATIENT with Peewee Loving MD  
[facility] [address] [facility] Appt Note: 1 YR FLOW PVR UA PSA PRIOR  
 [facility] [address] Upcoming Health Maintenance Date Due  
 FOOT EXAM Q1 6/2/1955 MICROALBUMIN Q1 6/2/1955 EYE EXAM RETINAL OR DILATED Q1 6/2/1953 DTaP/Tdap/Td series (1 - Tdap) 6/2/1964 FOBT Q 1 YEAR AGE 50-75 6/2/1993 ZOSTER VACCINE AGE 60> 4/2/2003 GLAUCOMA SCREENING Q2Y 6/2/2008 MEDICARE YEARLY EXAM 6/2/2008 HEMOGLOBIN A1C Q6M 4/21/2018 Pneumococcal 65+ Low/Medium Risk (2 of 2 - PPSV23) 4/25/2018 LIPID PANEL Q1 10/21/2018 Allergies as of 11/22/2017  Review Complete On: 11/22/2017 By: Dimple Renteria MD  
  
 Severity Noted Reaction Type Reactions Tradjenta [Linagliptin] High 01/06/2015    Anaphylaxis Ace Inhibitors  01/06/2015    Angioedema Current Immunizations  Reviewed on 10/20/2017 Name Date Influenza Vaccine (Quad) PF 10/10/2017  4:47 PM  
  
 Not reviewed this visit You Were Diagnosed With   
  
 Codes Comments Transient cerebral ischemia, unspecified type    -  Primary ICD-10-CM: G45.9 ICD-9-CM: 435.9 Pacemaker     ICD-10-CM: Z95.0 ICD-9-CM: V45.01  Atrial fibrillation, unspecified type ([facility] [address]     ICD-10-CM: I48.91 
ICD-9-CM: 427.31   
 Rectal bleed     ICD-10-CM: K62.5 ICD-9-CM: 569.3 Essential hypertension     ICD-10-CM: I10 
ICD-9-CM: 401.9 Vitals BP Pulse Temp Resp Height(growth percentile) Weight(growth percentile) 140/80 96 97.9 °F (36.6 °C) (Oral) 16 5' 8\" (1.727 m) 220 lb (99.8 kg) SpO2 BMI Smoking Status 97% 33.45 kg/m2 Former Smoker BMI and BSA Data Body Mass Index Body Surface Area  
 33.45 kg/m 2 2.19 m 2 Preferred Pharmacy Pharmacy Name Phone [facility], [address] [place] [address] [address] [place] [address] Your Updated Medication List  
  
   
This list is accurate as of: 11/22/17  9:31 AM.  Always use your most recent med list.  
  
  
  
  
 albuterol 90 mcg/actuation inhaler Commonly known as:  PROVENTIL HFA, VENTOLIN HFA, PROAIR HFA Take 2 Puffs by inhalation every four (4) hours as needed for Wheezing or Shortness of Breath. Indications: Acute Bronchitis  
  
 allopurinol 300 mg tablet Commonly known as:  Bernett Theresa Take 150 mg by mouth daily. atorvastatin 80 mg tablet Commonly known as:  LIPITOR Take 1 Tab by mouth nightly. carvedilol 25 mg tablet Commonly known as:  Alexys Liter Take 25 mg by mouth two (2) times daily (with meals). clopidogrel 75 mg Tab Commonly known as:  PLAVIX Take 1 Tab by mouth daily. digoxin 0.25 mg tablet Commonly known as:  LANOXIN Take 1 Tab by mouth daily. finasteride 5 mg tablet Commonly known as:  PROSCAR Take 1 Tab by mouth daily. furosemide 40 mg tablet Commonly known as:  LASIX Take  by mouth daily. * AMARYL 4 mg tablet Generic drug:  glimepiride Take 4 mg by mouth every morning. * glimepiride 4 mg tablet Commonly known as:  AMARYL Take 1 Tab by mouth every morning. NOTE: Patient takes half a tablet daily. No new Rx written. This is entered to correct medication list.  
  
 losartan 100 mg tablet Commonly known as:  COZAAR  
 Take 100 mg by mouth daily. metFORMIN 1,000 mg tablet Commonly known as:  GLUCOPHAGE Take 1,000 mg by mouth two (2) times daily (with meals). multivitamin tablet Commonly known as:  ONE A DAY Take 1 tablet by mouth daily. pantoprazole 40 mg tablet Commonly known as:  PROTONIX Take 1 Tab by mouth two (2) times a day. spironolactone 25 mg tablet Commonly known as:  ALDACTONE Take  by mouth daily. VITAMIN B-12 1,000 mcg tablet Generic drug:  cyanocobalamin Take 1,000 mcg by mouth daily. VITAMIN C 1,000 mg tablet Generic drug:  ascorbic acid (vitamin C) Take 1,000 mg by mouth. VITAMIN D3 1,000 unit tablet Generic drug:  cholecalciferol Take 1,000 Units by mouth daily. ZETIA 10 mg tablet Generic drug:  ezetimibe Take  by mouth. * Notice: This list has 2 medication(s) that are the same as other medications prescribed for you. Read the directions carefully, and ask your doctor or other care provider to review them with you. Follow-up Instructions Return if symptoms worsen or fail to improve. Patient Instructions Transient Ischemic Attack: Care Instructions Your Care Instructions A transient ischemic attack (TIA) is when blood flow to a part of your brain is blocked for a short time. A TIA is like a stroke but usually lasts only a few minutes. A TIA does not cause lasting brain damage. Any vision problems, slurred speech, or other symptoms usually go away in 10 to 20 minutes. But they may last for up to 24 hours. TIAs are often warning signs of a stroke. Some people who have a TIA may have a stroke in the future. A stroke can cause symptoms like those of a TIA. But a stroke causes lasting damage to your brain. You can take steps to help prevent a stroke. One thing you can do is get early treatment.  If you have other new symptoms, or if your symptoms do not get better, go back to the emergency room or call your doctor right away. Getting treatment right away may prevent long-term brain damage caused by a stroke. The doctor has checked you carefully, but problems can develop later. If you notice any problems or new symptoms, get medical treatment right away. Follow-up care is a key part of your treatment and safety. Be sure to make and go to all appointments, and call your doctor if you are having problems. It's also a good idea to know your test results and keep a list of the medicines you take. How can you care for yourself at home? Medicines ? · Be safe with medicines. Take your medicines exactly as prescribed. Call your doctor if you think you are having a problem with your medicine. ? · If you take a blood thinner, such as aspirin, be sure you get instructions about how to take your medicine safely. Blood thinners can cause serious bleeding problems. ? · Call your doctor if you are not able to take your medicines for any reason. ? · Do not take any over-the-counter medicines or herbal products without talking to your doctor first.  
? · If you take birth control pills or hormone therapy, talk to your doctor. Ask if these treatments are right for you. ? Lifestyle changes ? · Do not smoke. If you need help quitting, talk to your doctor about stop-smoking programs and medicines. ? · Be active. If your doctor recommends it, get more exercise. Walking is a good choice. Bit by bit, increase the amount you walk every day. Try for at least 30 minutes on most days of the week. You also may want to swim, bike, or do other activities. ? · Eat heart-healthy foods. These include fruits, vegetables, high-fiber foods, fish, and foods that are low in sodium, saturated fat, and trans fat. ? · Stay at a healthy weight. Lose weight if you need to.  
? · Limit alcohol to 2 drinks a day for men and 1 drink a day for women. ?Staying healthy ? · Manage other health problems such as diabetes, high blood pressure, and high cholesterol. ? · Get the flu vaccine every year. When should you call for help? Call 911 anytime you think you may need emergency care. For example, call if: 
? · You have new or worse symptoms of a stroke. These may include: 
¨ Sudden numbness, tingling, weakness, or loss of movement in your face, arm, or leg, especially on only one side of your body. ¨ Sudden vision changes. ¨ Sudden trouble speaking. ¨ Sudden confusion or trouble understanding simple statements. ¨ Sudden problems with walking or balance. ¨ A sudden, severe headache that is different from past headaches. Call 911 even if these symptoms go away in a few minutes. ? · You feel like you are having another TIA. ? Watch closely for changes in your health, and be sure to contact your doctor if you have any problems. Where can you learn more? Go to http://yaw-junie.info/. Enter [phone] in the search box to learn more about \"Transient Ischemic Attack: Care Instructions. \" Current as of: March 20, 2017 Content Version: 11.4 © 4697-4813 [employer]. Care instructions adapted under license by [employer] (which disclaims liability or warranty for this information). If you have questions about a medical condition or this instruction, always ask your healthcare professional. [address] any warranty or liability for your use of this information. Patient Instructions History Introducing [facility]! [facility] introduces [employer] patient portal. Now you can access parts of your medical record, email your doctor's office, and request medication refills online. 1. In your internet browser, go to [email or web address] 2. Click on the First Time User? Click Here link in the Sign In box. You will see the New Member Sign Up page. 3. Enter your Editorially Access Code exactly as it appears below. You will not need to use this code after youve completed the sign-up process. If you do not sign up before the expiration date, you must request a new code. · Editorially Access Code: [email or web address] Expires: 1/8/2018  3:21 PM 
 
4. Enter the last four digits of your Social Security Number (xxxx) and Date of Birth (mm/dd/yyyy) as indicated and click Submit. You will be taken to the next sign-up page. 5. Create a Editorially ID. This will be your Editorially login ID and cannot be changed, so think of one that is secure and easy to remember. 6. Create a Editorially password. You can change your password at any time. 7. Enter your Password Reset Question and Answer. This can be used at a later time if you forget your password. 8. Enter your e-mail address. You will receive e-mail notification when new information is available in [address] 9. Click Sign Up. You can now view and download portions of your medical record. 10. Click the Download Summary menu link to download a portable copy of your medical information. If you have questions, please visit the Frequently Asked Questions section of the Editorially website. Remember, Editorially is NOT to be used for urgent needs. For medical emergencies, dial 911. Now available from your iPhone and Android! Please provide this summary of care documentation to your next provider. Your primary care clinician is listed as [facility]. If you have any questions after today's visit, please call [phone].

## 2017-11-22 NOTE — PROGRESS NOTES
[facility]   [address] [facility], [address] [place] [address], 30 Located within [facility] [address]      Date:     Name:  Mamadou Maria  :  1945  MRN:  [id number]     PCP:  Jen Damian MD    Chief Complaint   Patient presents with   [facility]     NP: [facility] F/U TIA         HISTORY OF PRESENT ILLNESS:      Patient presents in follow-up following hospitalization for TIA. Patient had onset of right-sided weakness which cleared. He was off of his Coumadin taken for atrial fibrillation due to her recent GI bleed. He has been restarted on aspirin. He denies any further bleeding or TIA or strokelike symptoms. Chart is reviewed including recent evaluation. Patient denies any new symptoms. Review of Systems - History obtained from chart review and the patient  General ROS: negative  Psychological ROS: negative  Cardiovascular ROS: no chest pain or dyspnea on exertion  Gastrointestinal ROS: no abdominal pain, change in bowel habits, or black or bloody stools  Musculoskeletal ROS: negative  Neurological ROS: no TIA or stroke symptoms since hospitalization  Dermatological ROS: negative     Current Outpatient Prescriptions   Medication Sig    glimepiride (AMARYL) 4 mg tablet Take 1 Tab by mouth every morning. NOTE: Patient takes half a tablet daily. No new Rx written. This is entered to correct medication list.    clopidogrel (PLAVIX) 75 mg tab Take 1 Tab by mouth daily.  digoxin (LANOXIN) 0.25 mg tablet Take 1 Tab by mouth daily.  atorvastatin (LIPITOR) 80 mg tablet Take 1 Tab by mouth nightly.  albuterol (PROVENTIL HFA, VENTOLIN HFA, PROAIR HFA) 90 mcg/actuation inhaler Take 2 Puffs by inhalation every four (4) hours as needed for Wheezing or Shortness of Breath. Indications: Acute Bronchitis    pantoprazole (PROTONIX) 40 mg tablet Take 1 Tab by mouth two (2) times a day.  finasteride (PROSCAR) 5 mg tablet Take 1 Tab by mouth daily.     cholecalciferol (VITAMIN D3) 1,000 unit tablet Take 1,000 Units by mouth daily.  ascorbic acid, vitamin C, (VITAMIN C) 1,000 mg tablet Take 1,000 mg by mouth.  carvedilol (COREG) 25 mg tablet Take 25 mg by mouth two (2) times daily (with meals).  metFORMIN (GLUCOPHAGE) 1,000 mg tablet Take 1,000 mg by mouth two (2) times daily (with meals).  allopurinol (ZYLOPRIM) 300 mg tablet Take 150 mg by mouth daily.  spironolactone (ALDACTONE) 25 mg tablet Take  by mouth daily.  furosemide (LASIX) 40 mg tablet Take  by mouth daily.  losartan (COZAAR) 100 mg tablet Take 100 mg by mouth daily.  ezetimibe (ZETIA) 10 mg tablet Take  by mouth.  cyanocobalamin (VITAMIN B-12) 1,000 mcg tablet Take 1,000 mcg by mouth daily.  multivitamin (ONE A DAY) tablet Take 1 tablet by mouth daily.  glimepiride (AMARYL) 4 mg tablet Take 4 mg by mouth every morning. No current facility-administered medications for this visit. Allergies   Allergen Reactions    Tradjenta [Linagliptin] Anaphylaxis    Ace Inhibitors Angioedema     Past Medical History:   Diagnosis Date    Anemia     Atrial fibrillation (HCC)     BPH (benign prostatic hypertrophy)     CAD (coronary artery disease)     CHF (congestive heart failure) (HCC)     Diabetes (HCC)     Gout     Hyperlipidemia     Hypertension     Pacemaker     Vitamin D deficiency      Past Surgical History:   Procedure Laterality Date    COLONOSCOPY N/A 4/3/2017    COLONOSCOPY performed by Yudelka Rachel MD at [facility]COPY    HX OTHER SURGICAL      colonoscopy    HX PACEMAKER      defibrillator    HX PACEMAKER PLACEMENT      HX TONSILLECTOMY       Social History     Social History    Marital status:      Spouse name: N/A    Number of children: N/A    Years of education: N/A     Occupational History    Not on file.      Social History Main Topics    Smoking status: Former Smoker     Quit date: 7/29/1981    Smokeless tobacco: Never Used    Alcohol use No    Drug use: No    Sexual activity: Not on file     Other Topics Concern    Not on file     Social History Narrative     No family history on file. PHYSICAL EXAMINATION:    Visit Vitals    /80    Pulse 96    Temp 97.9 °F (36.6 °C) (Oral)    Resp 16    Ht 5' 8\" (1.727 m)    Wt 99.8 kg (220 lb)    SpO2 97%    BMI 33.45 kg/m2     General:  Well defined, nourished, and groomed individual in no acute distress. Neck: Supple, nontender, thyroid within normal limits,, no bruits, no pain with resistance to active range of motion. Heart: Regular rate and rhythm, no murmurs, rub, or gallop. Normal S1S2. Lungs:  Clear to auscultation bilaterally with equal chest expansion, no cough, no wheeze    Psych:  Good mood and bright affect    NEUROLOGICAL EXAMINATION:     Mental Status:   Alert and oriented to person, place, and time with recent and remote memory fairly intact. Attention span and concentration are normal. Speech is fluent with a fair  fund of knowledge. Cranial Nerves:    II, III, IV, VI:  Visual acuity grossly intact. Visual fields are normal.    Pupils are equal, round, and reactive to light and accommodation. Extra-ocular movements are full and fluid. Fundoscopic exam was not visualized, no ptosis or nystagmus. V-XII: Facial features are asymmetric, with normal sensation and strength. Motor Examination: Normal tone, bulk, and strength,muscle strength consistent with effort throughout. No cogwheel rigidity or clonus present. Coordination:  No resting or intention tremor. Normal gait    Gait and Station:  Steady while walking , and with fair tandem walking. Normal arm swing. No muscle wasting or fasiculations noted. Lab/Data Review:  Reviewed recent hospitalization  ASSESSMENT AND PLAN    ICD-10-CM ICD-9-CM    1. Transient cerebral ischemia, unspecified type G45.9 435.9    2. Pacemaker Z95.0 V45.01    3. Atrial fibrillation, unspecified type ([facility] [address] I48.91 427.31    4.  Rectal bleed K62.5 569.3 5. Essential hypertension I10 401.9        Keisha tim 74 y.o. y.o.  who has risk factors including atrial fibrillation,hypertension,diabetes,hyperlipidemia,age     Plan:   Reviewed when to call 911 ,recent hospitalization includingct scan imaging    I spent 43 minutes with the patient in face-to-face consultation, of which greater than 50% was spent in counseling and coordination of care as described above.

## 2017-11-24 ENCOUNTER — PATIENT OUTREACH (OUTPATIENT)
Dept: CASE MANAGEMENT | Age: 72
End: 2017-11-24

## 2017-11-28 ENCOUNTER — PATIENT OUTREACH (OUTPATIENT)
Dept: CASE MANAGEMENT | Age: 72
End: 2017-11-28

## 2017-11-28 NOTE — PROGRESS NOTES
Follow up Dx:  [facility] 10/20-10/21/17 for acute CVA.     Nurse Navigator(NN) contacted the patient by telephone to perform follow up assessment. Verified  and address with patient as identifiers. Hospitalizations/ ED visits :  none    Patient reports that he is \"doing just fine\", denies headaches, dizziness, weakness, difficulty swallowing or understanding spoken or written word. Reports he attended neurology follow up on 17. Medication:   Patient reports that Plavix was discontinued and he was re-started on coumadin (with alternating dose but he doesn't have calendar with him to give correct dosages to NN) and aspirin 81mg daily. States he goes to [facility] 2 weeks for labwork. Patient has attended [facility] appointments ( PCP 17, neuro 17), refused [facility] admission and has not had any ED visits or hospitalizations since discharge on 10/21/17. Resolving current episode.

## 2018-04-24 PROBLEM — E11.21 TYPE 2 DIABETES WITH NEPHROPATHY (HCC): Status: ACTIVE | Noted: 2018-04-24

## 2020-12-18 ENCOUNTER — HOSPITAL ENCOUNTER (OUTPATIENT)
Dept: LAB | Age: 75
Discharge: HOME | End: 2020-12-18
Payer: MEDICARE

## 2020-12-18 PROCEDURE — 87635 SARS-COV-2 COVID-19 AMP PRB: CPT

## 2020-12-19 LAB — SARS-COV-2, COV2NT: NOT DETECTED

## 2020-12-23 ENCOUNTER — ANESTHESIA EVENT (OUTPATIENT)
Dept: ENDOSCOPY | Age: 75
End: 2020-12-23
Payer: MEDICARE

## 2020-12-24 ENCOUNTER — ANESTHESIA (OUTPATIENT)
Dept: ENDOSCOPY | Age: 75
End: 2020-12-24
Payer: MEDICARE

## 2020-12-24 ENCOUNTER — HOSPITAL ENCOUNTER (OUTPATIENT)
Age: 75
Setting detail: OUTPATIENT SURGERY
Discharge: HOME | End: 2020-12-24
Attending: INTERNAL MEDICINE | Admitting: INTERNAL MEDICINE
Payer: MEDICARE

## 2020-12-24 VITALS
BODY MASS INDEX: 28.08 KG/M2 | WEIGHT: 189.6 LBS | OXYGEN SATURATION: 98 % | TEMPERATURE: 97.8 F | HEART RATE: 75 BPM | SYSTOLIC BLOOD PRESSURE: 128 MMHG | HEIGHT: 69 IN | DIASTOLIC BLOOD PRESSURE: 70 MMHG | RESPIRATION RATE: 16 BRPM

## 2020-12-24 LAB
GLUCOSE BLD STRIP.AUTO-MCNC: 90 MG/DL (ref 70–110)
GLUCOSE BLD STRIP.AUTO-MCNC: 92 MG/DL (ref 70–110)

## 2020-12-24 PROCEDURE — 74011000250 HC RX REV CODE- 250: Performed by: NURSE ANESTHETIST, CERTIFIED REGISTERED

## 2020-12-24 PROCEDURE — 76060000031 HC ANESTHESIA FIRST 0.5 HR: Performed by: INTERNAL MEDICINE

## 2020-12-24 PROCEDURE — 76040000019: Performed by: INTERNAL MEDICINE

## 2020-12-24 PROCEDURE — 77030019988 HC FCPS ENDOSC DISP BSC -B: Performed by: INTERNAL MEDICINE

## 2020-12-24 PROCEDURE — 88305 TISSUE EXAM BY PATHOLOGIST: CPT

## 2020-12-24 PROCEDURE — 2709999900 HC NON-CHARGEABLE SUPPLY: Performed by: INTERNAL MEDICINE

## 2020-12-24 PROCEDURE — 74011250637 HC RX REV CODE- 250/637: Performed by: NURSE ANESTHETIST, CERTIFIED REGISTERED

## 2020-12-24 PROCEDURE — 77030008565 HC TBNG SUC IRR ERBE -B: Performed by: INTERNAL MEDICINE

## 2020-12-24 PROCEDURE — 82962 GLUCOSE BLOOD TEST: CPT

## 2020-12-24 PROCEDURE — 74011250636 HC RX REV CODE- 250/636: Performed by: NURSE ANESTHETIST, CERTIFIED REGISTERED

## 2020-12-24 PROCEDURE — 99100 ANES PT EXTEME AGE<1 YR&>70: CPT | Performed by: ANESTHESIOLOGY

## 2020-12-24 PROCEDURE — 00731 ANES UPR GI NDSC PX NOS: CPT | Performed by: ANESTHESIOLOGY

## 2020-12-24 RX ORDER — LIDOCAINE HYDROCHLORIDE 20 MG/ML
INJECTION, SOLUTION EPIDURAL; INFILTRATION; INTRACAUDAL; PERINEURAL AS NEEDED
Status: DISCONTINUED | OUTPATIENT
Start: 2020-12-24 | End: 2020-12-24 | Stop reason: HOSPADM

## 2020-12-24 RX ORDER — SODIUM CHLORIDE 0.9 % (FLUSH) 0.9 %
5-40 SYRINGE (ML) INJECTION EVERY 8 HOURS
Status: DISCONTINUED | OUTPATIENT
Start: 2020-12-24 | End: 2020-12-24 | Stop reason: HOSPADM

## 2020-12-24 RX ORDER — INSULIN LISPRO 100 [IU]/ML
INJECTION, SOLUTION INTRAVENOUS; SUBCUTANEOUS ONCE
Status: DISCONTINUED | OUTPATIENT
Start: 2020-12-24 | End: 2020-12-24 | Stop reason: HOSPADM

## 2020-12-24 RX ORDER — MAGNESIUM SULFATE 100 %
4 CRYSTALS MISCELLANEOUS AS NEEDED
Status: CANCELLED | OUTPATIENT
Start: 2020-12-24

## 2020-12-24 RX ORDER — SODIUM CHLORIDE 0.9 % (FLUSH) 0.9 %
5-40 SYRINGE (ML) INJECTION AS NEEDED
Status: CANCELLED | OUTPATIENT
Start: 2020-12-24

## 2020-12-24 RX ORDER — SODIUM CHLORIDE 0.9 % (FLUSH) 0.9 %
5-40 SYRINGE (ML) INJECTION AS NEEDED
Status: DISCONTINUED | OUTPATIENT
Start: 2020-12-24 | End: 2020-12-24 | Stop reason: HOSPADM

## 2020-12-24 RX ORDER — ONDANSETRON 2 MG/ML
4 INJECTION INTRAMUSCULAR; INTRAVENOUS ONCE
Status: CANCELLED | OUTPATIENT
Start: 2020-12-24 | End: 2020-12-24

## 2020-12-24 RX ORDER — SODIUM CHLORIDE, SODIUM LACTATE, POTASSIUM CHLORIDE, CALCIUM CHLORIDE 600; 310; 30; 20 MG/100ML; MG/100ML; MG/100ML; MG/100ML
75 INJECTION, SOLUTION INTRAVENOUS CONTINUOUS
Status: DISCONTINUED | OUTPATIENT
Start: 2020-12-24 | End: 2020-12-24 | Stop reason: HOSPADM

## 2020-12-24 RX ORDER — INSULIN LISPRO 100 [IU]/ML
INJECTION, SOLUTION INTRAVENOUS; SUBCUTANEOUS ONCE
Status: CANCELLED | OUTPATIENT
Start: 2020-12-24 | End: 2020-12-24

## 2020-12-24 RX ORDER — PROPOFOL 10 MG/ML
INJECTION, EMULSION INTRAVENOUS AS NEEDED
Status: DISCONTINUED | OUTPATIENT
Start: 2020-12-24 | End: 2020-12-24 | Stop reason: HOSPADM

## 2020-12-24 RX ORDER — SODIUM CHLORIDE 0.9 % (FLUSH) 0.9 %
5-40 SYRINGE (ML) INJECTION EVERY 8 HOURS
Status: CANCELLED | OUTPATIENT
Start: 2020-12-24

## 2020-12-24 RX ORDER — DEXTROSE 50 % IN WATER (D50W) INTRAVENOUS SYRINGE
25-50 AS NEEDED
Status: CANCELLED | OUTPATIENT
Start: 2020-12-24

## 2020-12-24 RX ORDER — FAMOTIDINE 20 MG/1
20 TABLET, FILM COATED ORAL ONCE
Status: COMPLETED | OUTPATIENT
Start: 2020-12-24 | End: 2020-12-24

## 2020-12-24 RX ADMIN — PROPOFOL 70 MG: 10 INJECTION, EMULSION INTRAVENOUS at 09:20

## 2020-12-24 RX ADMIN — SODIUM CHLORIDE, SODIUM LACTATE, POTASSIUM CHLORIDE, AND CALCIUM CHLORIDE 75 ML/HR: 600; 310; 30; 20 INJECTION, SOLUTION INTRAVENOUS at 09:13

## 2020-12-24 RX ADMIN — LIDOCAINE HYDROCHLORIDE 100 MG: 20 INJECTION, SOLUTION EPIDURAL; INFILTRATION; INTRACAUDAL; PERINEURAL at 09:39

## 2020-12-24 RX ADMIN — FAMOTIDINE 20 MG: 20 TABLET, FILM COATED ORAL at 09:13

## 2020-12-24 NOTE — H&P
[facility]    [email or web address]      Impression: 1. Gastric intestinal metaplasia surveillance       Plan:     1.   egd bx mac all risks benefits and alt discussed     Chief Complaint: gastric IM       HPI:  Luis Rousseau is a 66 y.o. male who is being seen on consult for gastric IM surveillance .     PMH:   Past Medical History:   Diagnosis Date    Anemia     Atrial fibrillation (HCC)     BPH (benign prostatic hypertrophy)     CAD (coronary artery disease)     CHF (congestive heart failure) (HCC)     Diabetes (HCC)     Gout     Hyperlipidemia     Hypertension     Pacemaker     Vitamin D deficiency        PSH:   Past Surgical History:   Procedure Laterality Date    COLONOSCOPY N/A 4/3/2017    COLONOSCOPY performed by Clarissa Pitts MD at [facility]COPY    HX OTHER SURGICAL      colonoscopy    HX PACEMAKER      defibrillator    HX PACEMAKER PLACEMENT      HX TONSILLECTOMY         Social HX:   Social History     Socioeconomic History    Marital status:      Spouse name: Not on file    Number of children: Not on file    Years of education: Not on file    Highest education level: Not on file   Occupational History    Not on file   Social Needs    Financial resource strain: Not on file    Food insecurity     Worry: Not on file     Inability: Not on file    Transportation needs     Medical: Not on file     Non-medical: Not on file   Tobacco Use    Smoking status: Former Smoker     Quit date: 1981     Years since quittin.4    Smokeless tobacco: Never Used   Substance and Sexual Activity    Alcohol use: No    Drug use: No    Sexual activity: Not Currently   Lifestyle    Physical activity     Days per week: Not on file     Minutes per session: Not on file    Stress: Not on file   Relationships    Social connections     Talks on phone: Not on file     Gets together: Not on file     Attends Taoist service: Not on file Active member of club or organization: Not on file     Attends meetings of clubs or organizations: Not on file     Relationship status: Not on file    Intimate partner violence     Fear of current or ex partner: Not on file     Emotionally abused: Not on file     Physically abused: Not on file     Forced sexual activity: Not on file   Other Topics Concern    Not on file   Social History Narrative    Not on file       FHX:   History reviewed. No pertinent family history. Allergy:   Allergies   Allergen Reactions    Tradjenta [Linagliptin] Anaphylaxis    Ace Inhibitors Angioedema       Home Medications:     Medications Prior to Admission   Medication Sig    tamsulosin (FLOMAX) 0.4 mg capsule Take 2 Caps by mouth daily (after dinner).  furosemide (LASIX) 40 mg tablet furosemide 40 mg tablet    atorvastatin (LIPITOR) 40 mg tablet atorvastatin 40 mg tablet    finasteride (PROSCAR) 5 mg tablet Take 1 Tab by mouth daily.  doxycycline (ADOXA) 100 mg tablet Take 1 Tab by mouth two (2) times a day.  aspirin delayed-release 81 mg tablet 81 mg.    ferrous sulfate 325 mg (65 mg iron) tablet 325 mg.    multivitamins-minerals-lutein (MEN'S CENTRUM SILVER WITH LUTEIN) tab tablet Take 1 Tab by Mouth Once a Day.  vit B complex no.12/niacin,B3, (VITAMIN B COMPLEX NO.12-NIACIN PO) Take  by Mouth.  pantoprazole (PROTONIX) 40 mg tablet Take 1 Tab by mouth two (2) times a day.  cholecalciferol (VITAMIN D3) 1,000 unit tablet Take 1,000 Units by mouth daily.  ascorbic acid, vitamin C, (VITAMIN C) 1,000 mg tablet Take 1,000 mg by mouth.  carvedilol (COREG) 25 mg tablet Take 25 mg by mouth two (2) times daily (with meals).  metFORMIN (GLUCOPHAGE) 1,000 mg tablet Take 1,000 mg by mouth two (2) times daily (with meals).  allopurinol (ZYLOPRIM) 300 mg tablet Take 150 mg by mouth daily.  spironolactone (ALDACTONE) 25 mg tablet Take  by mouth daily.     losartan (COZAAR) 100 mg tablet Take 100 mg by mouth daily.  ezetimibe (ZETIA) 10 mg tablet Take  by mouth.  warfarin (COUMADIN) 10 mg tablet Take 1 tablet by mouth daily or as directed by [facility] [phone]       Review of Systems:     Constitutional: No fevers, chills, weight loss, fatigue. Skin: No rashes, pruritis, jaundice, ulcerations, erythema. HENT: No headaches, nosebleeds, sinus pressure, rhinorrhea, sore throat. Eyes: No visual changes, blurred vision, eye pain, photophobia, jaundice. Cardiovascular: No chest pain, heart palpitations. Respiratory: No cough, SOB, wheezing, chest discomfort, orthopnea. Gastrointestinal: Neg    Genitourinary: No dysuria, bleeding, discharge, pyuria. Musculoskeletal: No weakness, arthralgias, wasting. Endo: No sweats. Heme: No bruising, easy bleeding. Allergies: As noted. Neurological: Cranial nerves intact. Alert and oriented. Gait not assessed. Psychiatric:  No anxiety, depression, hallucinations. Visit Vitals  /69   Pulse 70   Temp 98.6 °F (37 °C)   Resp 20   Ht 5' 8.5\" (1.74 m)   Wt 86 kg (189 lb 9.6 oz)   SpO2 99%   BMI 28.41 kg/m²       Physical Assessment:     constitutional: appearance: well developed, well nourished, normal habitus, no deformities, in no acute distress. skin: inspection: no rashes, ulcers, icterus or other lesions; no clubbing or telangiectasias. palpation: no induration or subcutaneos nodules. eyes: inspection: normal conjunctivae and lids; no jaundice pupils: symmetrical, normoreactive to light, normal accommodation and size. ENMT: mouth: normal oral mucosa,lips and gums; good dentition. oropharynx: normal tongue, hard and soft palate; posterior pharynx without erythema, exudate or lesions. neck: no masses organomegaly or tenderness. respiratory: effort: normal chest excursion; no intercostal retraction or accessory muscle use. cardiovascular: abdominal aorta: normal size and position; no bruits. palpation: PMI of normal size and position; normal rhythm; no thrill or murmurs. abdominal: abdomen: normal consistency; no tenderness or masses. hernias: no hernias appreciated. liver: normal size and consistency. spleen: not palpable. rectal: hemoccult/guaiac: not performed. musculoskeletal: no deformities or muscle wasting   lymphatic: axilae: not palpable. groin: not palpable. neck: within normal limits. other: not palpable. neurologic: cranial nerves: II-XII normal.   psychiatric: judgement/insight: within normal limits. memory: within normal limits for recent and remote events. mood and affect: no evidence of depression, anxiety or agitation. orientation: oriented to time, space and person. Basic Metabolic Profile   No results for input(s): NA, K, CL, CO2, BUN, GLU, CA, MG, PHOS in the last 72 hours. No lab exists for component: CREAT      CBC w/Diff    No results for input(s): WBC, RBC, HGB, HCT, MCV, MCH, MCHC, RDW, PLT, HGBEXT, HCTEXT, PLTEXT in the last 72 hours. No lab exists for component: MPV No results for input(s): GRANS, LYMPH, EOS, PRO, MYELO, METAS, BLAST in the last 72 hours. No lab exists for component: MONO, BASO     Hepatic Function   No results for input(s): ALB, TP, TBILI, AP, AML, LPSE in the last 72 hours. No lab exists for component: DBILI, GPT, SGOT       Hussein Ha MD, MPasqualeD. [facility], [address] [place] [address]  [email or web address]. [facility]

## 2020-12-24 NOTE — H&P
[email or web address] Impression: 1. Family hx of colon ca Plan: 1. [place] mac all risks benefits and alt discussed Chief Complaint: fam hx colon ca HPI: 
Mihsa Ponce is a 66 y.o. male who is being seen on consult for family hx of colon ca . PMH:  
Past Medical History:  
Diagnosis Date  Anemia  Atrial fibrillation ([facility] [address]  BPH (benign prostatic hypertrophy)  CAD (coronary artery disease)  CHF (congestive heart failure) ([facility] [address]  Diabetes ([facility] [address]  Gout  Hyperlipidemia  Hypertension  Pacemaker  Vitamin D deficiency PSH:  
Past Surgical History:  
Procedure Laterality Date  COLONOSCOPY N/A 4/3/2017 COLONOSCOPY performed by Margarita Rodgers MD at [address] HX OTHER SURGICAL    
 colonoscopy  HX PACEMAKER    
 defibrillator  HX PACEMAKER PLACEMENT    
 HX TONSILLECTOMY Social HX:  
Social History Socioeconomic History  Marital status:  Spouse name: Not on file  Number of children: Not on file  Years of education: Not on file  Highest education level: Not on file Occupational History  Not on file Social Needs  Financial resource strain: Not on file  Food insecurity Worry: Not on file Inability: Not on file  Transportation needs Medical: Not on file Non-medical: Not on file Tobacco Use  Smoking status: Former Smoker Quit date: 1981 Years since quittin.4  Smokeless tobacco: Never Used Substance and Sexual Activity  Alcohol use: No  
 Drug use: No  
 Sexual activity: Not Currently Lifestyle  Physical activity Days per week: Not on file Minutes per session: Not on file  Stress: Not on file Relationships  Social connections Talks on phone: Not on file Gets together: Not on file Attends Anabaptism service: Not on file Active member of club or organization: Not on file Attends meetings of clubs or organizations: Not on file Relationship status: Not on file  Intimate partner violence Fear of current or ex partner: Not on file Emotionally abused: Not on file Physically abused: Not on file Forced sexual activity: Not on file Other Topics Concern  Not on file Social History Narrative  Not on file FHX:  
History reviewed. No pertinent family history. Allergy: Allergies Allergen Reactions  Tradjenta [Linagliptin] Anaphylaxis  Ace Inhibitors Angioedema Home Medications:  
 
Medications Prior to Admission Medication Sig  tamsulosin (FLOMAX) 0.4 mg capsule Take 2 Caps by mouth daily (after dinner).  furosemide (LASIX) 40 mg tablet furosemide 40 mg tablet  atorvastatin (LIPITOR) 40 mg tablet atorvastatin 40 mg tablet  finasteride (PROSCAR) 5 mg tablet Take 1 Tab by mouth daily.  doxycycline (ADOXA) 100 mg tablet Take 1 Tab by mouth two (2) times a day.  aspirin delayed-release 81 mg tablet 81 mg.  
 ferrous sulfate 325 mg (65 mg iron) tablet 325 mg.  
 multivitamins-minerals-lutein (MEN'S CENTRUM SILVER WITH LUTEIN) tab tablet Take 1 Tab by Mouth Once a Day.  vit B complex no.12/niacin,B3, (VITAMIN B COMPLEX NO.12-NIACIN PO) Take  by Mouth.  pantoprazole (PROTONIX) 40 mg tablet Take 1 Tab by mouth two (2) times a day.  cholecalciferol (VITAMIN D3) 1,000 unit tablet Take 1,000 Units by mouth daily.  ascorbic acid, vitamin C, (VITAMIN C) 1,000 mg tablet Take 1,000 mg by mouth.  carvedilol (COREG) 25 mg tablet Take 25 mg by mouth two (2) times daily (with meals).  metFORMIN (GLUCOPHAGE) 1,000 mg tablet Take 1,000 mg by mouth two (2) times daily (with meals).  allopurinol (ZYLOPRIM) 300 mg tablet Take 150 mg by mouth daily.  spironolactone (ALDACTONE) 25 mg tablet Take  by mouth daily.  losartan (COZAAR) 100 mg tablet Take 100 mg by mouth daily.  ezetimibe (ZETIA) 10 mg tablet Take  by mouth.  warfarin (COUMADIN) 10 mg tablet Take 1 tablet by mouth daily or as directed by [facility] [phone] Review of Systems:  
 
Constitutional: No fevers, chills, weight loss, fatigue. Skin: No rashes, pruritis, jaundice, ulcerations, erythema. HENT: No headaches, nosebleeds, sinus pressure, rhinorrhea, sore throat. Eyes: No visual changes, blurred vision, eye pain, photophobia, jaundice. Cardiovascular: No chest pain, heart palpitations. Respiratory: No cough, SOB, wheezing, chest discomfort, orthopnea. Gastrointestinal: neg Genitourinary: No dysuria, bleeding, discharge, pyuria. Musculoskeletal: No weakness, arthralgias, wasting. Endo: No sweats. Heme: No bruising, easy bleeding. Allergies: As noted. Neurological: Cranial nerves intact. Alert and oriented. Gait not assessed. Psychiatric:  No anxiety, depression, hallucinations. Visit Vitals /63 Pulse 75 Temp 97.4 °F (36.3 °C) Resp 13 Ht 5' 8.5\" (1.74 m) Wt 86 kg (189 lb 9.6 oz) SpO2 100% BMI 28.41 kg/m² Physical Assessment:  
 
constitutional: appearance: well developed, well nourished, normal habitus, no deformities, in no acute distress. skin: inspection: no rashes, ulcers, icterus or other lesions; no clubbing or telangiectasias. palpation: no induration or subcutaneos nodules. eyes: inspection: normal conjunctivae and lids; no jaundice pupils: symmetrical, normoreactive to light, normal accommodation and size. ENMT: mouth: normal oral mucosa,lips and gums; good dentition. oropharynx: normal tongue, hard and soft palate; posterior pharynx without erythema, exudate or lesions. neck: no masses organomegaly or tenderness. respiratory: effort: normal chest excursion; no intercostal retraction or accessory muscle use. cardiovascular: abdominal aorta: normal size and position; no bruits. palpation: PMI of normal size and position; normal rhythm; no thrill or murmurs. abdominal: abdomen: normal consistency; no tenderness or masses. hernias: no hernias appreciated. liver: normal size and consistency. spleen: not palpable. rectal: hemoccult/guaiac: not performed. musculoskeletal: no deformities or muscle wasting  
lymphatic: axilae: not palpable. groin: not palpable. neck: within normal limits. other: not palpable. neurologic: cranial nerves: II-XII normal.  
psychiatric: judgement/insight: within normal limits. memory: within normal limits for recent and remote events. mood and affect: no evidence of depression, anxiety or agitation. orientation: oriented to time, space and person. Basic Metabolic Profile No results for input(s): NA, K, CL, CO2, BUN, GLU, CA, MG, PHOS in the last 72 hours. No lab exists for component: CREAT  
  
CBC w/Diff No results for input(s): WBC, RBC, HGB, HCT, MCV, MCH, MCHC, RDW, PLT, HGBEXT, HCTEXT, PLTEXT in the last 72 hours. No lab exists for component: MPV No results for input(s): GRANS, LYMPH, EOS, PRO, MYELO, METAS, BLAST in the last 72 hours. No lab exists for component: MONO, BASO Hepatic Function No results for input(s): ALB, TP, TBILI, AP, AML, LPSE in the last 72 hours. No lab exists for component: DBILI, GPT, SGOT  
 
 
Renetta Mart MD, MSERA. [facility], [address] [place] [address] 
[email or web address] [facility]

## 2020-12-24 NOTE — ANESTHESIA POSTPROCEDURE EVALUATION
Procedure(s):  UPPER ENDOSCOPY with gastric bx's. MAC    Anesthesia Post Evaluation        Patient location during evaluation: PACU  Patient participation: complete - patient participated  Level of consciousness: awake  Airway patency: patent  Anesthetic complications: no  Cardiovascular status: acceptable  Respiratory status: acceptable  Hydration status: euvolemic  Post anesthesia nausea and vomiting:  none      INITIAL Post-op Vital signs:   Vitals Value Taken Time   /72 12/24/20 0959   Temp     Pulse 69 12/24/20 1000   Resp 18 12/24/20 1000   SpO2 100 % 12/24/20 1000   Vitals shown include unvalidated device data.

## 2020-12-24 NOTE — ANESTHESIA PREPROCEDURE EVALUATION
Relevant Problems   CARDIOVASCULAR   (+) Atrial fibrillation (HCC)   (+) CHF (congestive heart failure) (HCC)   (+) Hypertension   (+) Pacemaker      GASTROINTESTINAL   (+) Acute gastric ulcer with hemorrhage      ENDOCRINE   (+) Diabetes (HCC)   (+) Type 2 diabetes with nephropathy (HCC)      HEMATOLOGY   (+) Anemia   (+) Symptomatic anemia       Anesthetic History   No history of anesthetic complications            Review of Systems / Medical History  Patient summary reviewed, nursing notes reviewed and pertinent labs reviewed    Pulmonary  Within defined limits                 Neuro/Psych   Within defined limits           Cardiovascular    Hypertension        Dysrhythmias   CAD      Comments: Former smoker, quit decades ago    Afib - on warfarin and ASA - held warfarin x5 days    Pacer/ICD placed years ago for [employer] HR\" - patient thinks it has never discharged. V paced at 90.     History of ischemic cardiomyopathy - cardiology visit in 2020 refers to 2015 echo showing EF 45%   GI/Hepatic/Renal           PUD     Endo/Other    Diabetes: well controlled         Other Findings            Physical Exam    Airway  Mallampati: I  TM Distance: 4 - 6 cm  Neck ROM: normal range of motion        Cardiovascular    Rhythm: regular  Rate: normal         Dental    Dentition: Loose teeth     Pulmonary  Breath sounds clear to auscultation               Abdominal         Other Findings            Anesthetic Plan    ASA: 4  Anesthesia type: MAC          Induction: Intravenous  Anesthetic plan and risks discussed with: Patient

## 2020-12-24 NOTE — DISCHARGE INSTRUCTIONS
Patient Education   Upper GI Endoscopy: What to Expect at [address] had an upper GI endoscopy. Your doctor used a thin, lighted tube that bends to look at the inside of your esophagus, your stomach, and the first part of the small intestine, called the duodenum. After you have an endoscopy, you will stay at the hospital or clinic for 1 to 2 hours. This will allow the medicine to wear off. You will be able to go home after your doctor or nurse checks to make sure that you're not having any problems. You may have to stay overnight if you had treatment during the test. You may have a sore throat for a day or two after the test.  This care sheet gives you a general idea about what to expect after the test.  How can you care for yourself at home? Activity   · Rest as much as you need to after you go home. · You should be able to go back to your usual activities the day after the test.  Diet   · Follow your doctor's directions for eating after the test.  · Drink plenty of fluids (unless your doctor has told you not to). Medications   · If you have a sore throat the day after the test, use an over-the-counter spray to numb your throat. Follow-up care is a key part of your treatment and safety. Be sure to make and go to all appointments, and call your doctor if you are having problems. It's also a good idea to know your test results and keep a list of the medicines you take. When should you call for help? Call 911 anytime you think you may need emergency care. For example, call if:    · You passed out (lost consciousness).     · You have trouble breathing.     · You pass maroon or bloody stools.    Call your doctor now or seek immediate medical care if:    · You have pain that does not get better after your take pain medicine.     · You have new or worse belly pain.     · You have blood in your stools.     · You are sick to your stomach and cannot keep fluids down.     · You have a fever.     · You cannot pass stools or gas. Watch closely for changes in your health, and be sure to contact your doctor if:    · Your throat still hurts after a day or two.     · You do not get better as expected. Where can you learn more? Go to http://www.sinha.com/  Enter J454 in the search box to learn more about \"Upper GI Endoscopy: What to Expect at Home. \"  Current as of: April 15, 2020               Content Version: 12.6  © 2006-2020 [employer]. Care instructions adapted under license by [employer] (which disclaims liability or warranty for this information). If you have questions about a medical condition or this instruction, always ask your healthcare professional. [address] any warranty or liability for your use of this information.

## 2021-05-04 ENCOUNTER — HOSPITAL ENCOUNTER (INPATIENT)
Age: 76
LOS: 3 days | Discharge: HOME | DRG: 356 | End: 2021-05-08
Attending: EMERGENCY MEDICINE | Admitting: INTERNAL MEDICINE
Payer: MEDICARE

## 2021-05-04 DIAGNOSIS — K92.2 LOWER GI BLEED: Primary | ICD-10-CM

## 2021-05-04 DIAGNOSIS — D62 ACUTE BLOOD LOSS ANEMIA: ICD-10-CM

## 2021-05-04 DIAGNOSIS — Z86.73 HISTORY OF CVA (CEREBROVASCULAR ACCIDENT): ICD-10-CM

## 2021-05-04 DIAGNOSIS — K92.2 ACUTE GI BLEEDING: ICD-10-CM

## 2021-05-04 DIAGNOSIS — Z71.89 GOALS OF CARE, COUNSELING/DISCUSSION: ICD-10-CM

## 2021-05-04 PROCEDURE — 99284 EMERGENCY DEPT VISIT MOD MDM: CPT

## 2021-05-05 ENCOUNTER — HOSPITAL ENCOUNTER (INPATIENT)
Dept: INTERVENTIONAL RADIOLOGY/VASCULAR | Age: 76
Discharge: HOME | DRG: 356 | End: 2021-05-05
Attending: PHYSICIAN ASSISTANT | Admitting: RADIOLOGY
Payer: MEDICARE

## 2021-05-05 ENCOUNTER — HOSPITAL ENCOUNTER (INPATIENT)
Dept: CT IMAGING | Age: 76
Discharge: HOME | DRG: 356 | End: 2021-05-05
Attending: PHYSICIAN ASSISTANT
Payer: MEDICARE

## 2021-05-05 VITALS
OXYGEN SATURATION: 100 % | HEART RATE: 73 BPM | SYSTOLIC BLOOD PRESSURE: 103 MMHG | RESPIRATION RATE: 14 BRPM | DIASTOLIC BLOOD PRESSURE: 54 MMHG

## 2021-05-05 PROBLEM — D62 ACUTE BLOOD LOSS ANEMIA: Status: ACTIVE | Noted: 2021-05-05

## 2021-05-05 PROBLEM — K92.2 LOWER GI BLEED: Status: ACTIVE | Noted: 2021-05-05

## 2021-05-05 PROBLEM — E86.1 HYPOVOLEMIA: Status: ACTIVE | Noted: 2021-05-05

## 2021-05-05 LAB
ALBUMIN SERPL-MCNC: 3.1 G/DL (ref 3.4–5)
ALBUMIN/GLOB SERPL: 1.2 {RATIO} (ref 0.8–1.7)
ALP SERPL-CCNC: 60 U/L (ref 45–117)
ALT SERPL-CCNC: 25 U/L (ref 16–61)
ANION GAP SERPL CALC-SCNC: 8 MMOL/L (ref 3–18)
AST SERPL-CCNC: 25 U/L (ref 10–38)
BASOPHILS # BLD: 0 K/UL (ref 0–0.1)
BASOPHILS # BLD: 0 K/UL (ref 0–0.1)
BASOPHILS NFR BLD: 0 % (ref 0–2)
BASOPHILS NFR BLD: 0 % (ref 0–2)
BILIRUB SERPL-MCNC: 0.9 MG/DL (ref 0.2–1)
BUN SERPL-MCNC: 51 MG/DL (ref 7–18)
BUN SERPL-MCNC: 59 MG/DL (ref 7–18)
BUN SERPL-MCNC: 70 MG/DL (ref 7–18)
BUN/CREAT SERPL: 28 (ref 12–20)
BUN/CREAT SERPL: 37 (ref 12–20)
BUN/CREAT SERPL: 41 (ref 12–20)
CA-I SERPL-SCNC: 1.11 MMOL/L (ref 1.12–1.32)
CALCIUM SERPL-MCNC: 7.3 MG/DL (ref 8.5–10.1)
CALCIUM SERPL-MCNC: 7.5 MG/DL (ref 8.5–10.1)
CALCIUM SERPL-MCNC: 8.4 MG/DL (ref 8.5–10.1)
CHLORIDE SERPL-SCNC: 107 MMOL/L (ref 100–111)
CHLORIDE SERPL-SCNC: 112 MMOL/L (ref 100–111)
CHLORIDE SERPL-SCNC: 115 MMOL/L (ref 100–111)
CO2 SERPL-SCNC: 22 MMOL/L (ref 21–32)
CO2 SERPL-SCNC: 23 MMOL/L (ref 21–32)
CO2 SERPL-SCNC: 27 MMOL/L (ref 21–32)
CREAT SERPL-MCNC: 1.58 MG/DL (ref 0.6–1.3)
CREAT SERPL-MCNC: 1.69 MG/DL (ref 0.6–1.3)
CREAT SERPL-MCNC: 1.82 MG/DL (ref 0.6–1.3)
DIFFERENTIAL METHOD BLD: ABNORMAL
DIFFERENTIAL METHOD BLD: ABNORMAL
EOSINOPHIL # BLD: 0 K/UL (ref 0–0.4)
EOSINOPHIL # BLD: 0 K/UL (ref 0–0.4)
EOSINOPHIL NFR BLD: 0 % (ref 0–5)
EOSINOPHIL NFR BLD: 0 % (ref 0–5)
ERYTHROCYTE [DISTWIDTH] IN BLOOD BY AUTOMATED COUNT: 17.9 % (ref 11.6–14.5)
ERYTHROCYTE [DISTWIDTH] IN BLOOD BY AUTOMATED COUNT: 17.9 % (ref 11.6–14.5)
ERYTHROCYTE [DISTWIDTH] IN BLOOD BY AUTOMATED COUNT: 18.3 % (ref 11.6–14.5)
EST. AVERAGE GLUCOSE BLD GHB EST-MCNC: 114 MG/DL
GLOBULIN SER CALC-MCNC: 2.5 G/DL (ref 2–4)
GLUCOSE BLD STRIP.AUTO-MCNC: 111 MG/DL (ref 70–110)
GLUCOSE BLD STRIP.AUTO-MCNC: 133 MG/DL (ref 70–110)
GLUCOSE BLD STRIP.AUTO-MCNC: 149 MG/DL (ref 70–110)
GLUCOSE SERPL-MCNC: 113 MG/DL (ref 74–99)
GLUCOSE SERPL-MCNC: 116 MG/DL (ref 74–99)
GLUCOSE SERPL-MCNC: 116 MG/DL (ref 74–99)
HBA1C MFR BLD: 5.6 % (ref 4.2–5.6)
HCT VFR BLD AUTO: 19.3 % (ref 36–48)
HCT VFR BLD AUTO: 22.5 % (ref 36–48)
HCT VFR BLD AUTO: 22.6 % (ref 36–48)
HGB BLD-MCNC: 6.1 G/DL (ref 13–16)
HGB BLD-MCNC: 7.2 G/DL (ref 13–16)
HGB BLD-MCNC: 7.4 G/DL (ref 13–16)
HISTORY CHECKED?,CKHIST: NORMAL
INR PPP: 1.4 (ref 0.8–1.2)
LYMPHOCYTES # BLD: 0.7 K/UL (ref 0.9–3.6)
LYMPHOCYTES # BLD: 0.7 K/UL (ref 0.9–3.6)
LYMPHOCYTES NFR BLD: 12 % (ref 21–52)
LYMPHOCYTES NFR BLD: 13 % (ref 21–52)
MAGNESIUM SERPL-MCNC: 1.4 MG/DL (ref 1.6–2.6)
MAGNESIUM SERPL-MCNC: 1.6 MG/DL (ref 1.6–2.6)
MCH RBC QN AUTO: 24.7 PG (ref 24–34)
MCH RBC QN AUTO: 24.8 PG (ref 24–34)
MCH RBC QN AUTO: 25.9 PG (ref 24–34)
MCHC RBC AUTO-ENTMCNC: 31.6 G/DL (ref 31–37)
MCHC RBC AUTO-ENTMCNC: 32 G/DL (ref 31–37)
MCHC RBC AUTO-ENTMCNC: 32.7 G/DL (ref 31–37)
MCV RBC AUTO: 77.6 FL (ref 74–97)
MCV RBC AUTO: 78.1 FL (ref 74–97)
MCV RBC AUTO: 79 FL (ref 74–97)
MONOCYTES # BLD: 0.2 K/UL (ref 0.05–1.2)
MONOCYTES # BLD: 0.2 K/UL (ref 0.05–1.2)
MONOCYTES NFR BLD: 3 % (ref 3–10)
MONOCYTES NFR BLD: 4 % (ref 3–10)
NEUTS SEG # BLD: 4.2 K/UL (ref 1.8–8)
NEUTS SEG # BLD: 4.6 K/UL (ref 1.8–8)
NEUTS SEG NFR BLD: 83 % (ref 40–73)
NEUTS SEG NFR BLD: 84 % (ref 40–73)
PHOSPHATE SERPL-MCNC: 4 MG/DL (ref 2.5–4.9)
PLATELET # BLD AUTO: 101 K/UL (ref 135–420)
PLATELET # BLD AUTO: 132 K/UL (ref 135–420)
PLATELET # BLD AUTO: 160 K/UL (ref 135–420)
PLATELET COMMENTS,PCOM: ABNORMAL
POTASSIUM SERPL-SCNC: 3.9 MMOL/L (ref 3.5–5.5)
POTASSIUM SERPL-SCNC: 4.1 MMOL/L (ref 3.5–5.5)
POTASSIUM SERPL-SCNC: 4.3 MMOL/L (ref 3.5–5.5)
PROT SERPL-MCNC: 5.6 G/DL (ref 6.4–8.2)
PROTHROMBIN TIME: 17 SEC (ref 11.5–15.2)
RBC # BLD AUTO: 2.47 M/UL (ref 4.35–5.65)
RBC # BLD AUTO: 2.86 M/UL (ref 4.35–5.65)
RBC # BLD AUTO: 2.9 M/UL (ref 4.35–5.65)
RBC MORPH BLD: ABNORMAL
SODIUM SERPL-SCNC: 142 MMOL/L (ref 136–145)
SODIUM SERPL-SCNC: 143 MMOL/L (ref 136–145)
SODIUM SERPL-SCNC: 145 MMOL/L (ref 136–145)
WBC # BLD AUTO: 5.1 K/UL (ref 4.6–13.2)
WBC # BLD AUTO: 5.5 K/UL (ref 4.6–13.2)
WBC # BLD AUTO: 9.7 K/UL (ref 4.6–13.2)

## 2021-05-05 PROCEDURE — 74011250636 HC RX REV CODE- 250/636: Performed by: PHYSICIAN ASSISTANT

## 2021-05-05 PROCEDURE — 74011250636 HC RX REV CODE- 250/636: Performed by: INTERNAL MEDICINE

## 2021-05-05 PROCEDURE — 84100 ASSAY OF PHOSPHORUS: CPT

## 2021-05-05 PROCEDURE — 30233L1 TRANSFUSION OF NONAUTOLOGOUS FRESH PLASMA INTO PERIPHERAL VEIN, PERCUTANEOUS APPROACH: ICD-10-PCS | Performed by: RADIOLOGY

## 2021-05-05 PROCEDURE — 74011250636 HC RX REV CODE- 250/636: Performed by: RADIOLOGY

## 2021-05-05 PROCEDURE — 74011000636 HC RX REV CODE- 636: Performed by: RADIOLOGY

## 2021-05-05 PROCEDURE — 04LB3DZ OCCLUSION OF INFERIOR MESENTERIC ARTERY WITH INTRALUMINAL DEVICE, PERCUTANEOUS APPROACH: ICD-10-PCS | Performed by: RADIOLOGY

## 2021-05-05 PROCEDURE — C9132 KCENTRA, PER I.U.: HCPCS

## 2021-05-05 PROCEDURE — 83735 ASSAY OF MAGNESIUM: CPT

## 2021-05-05 PROCEDURE — 74011250636 HC RX REV CODE- 250/636: Performed by: REGISTERED NURSE

## 2021-05-05 PROCEDURE — 30233K1 TRANSFUSION OF NONAUTOLOGOUS FROZEN PLASMA INTO PERIPHERAL VEIN, PERCUTANEOUS APPROACH: ICD-10-PCS | Performed by: RADIOLOGY

## 2021-05-05 PROCEDURE — 74174 CTA ABD&PLVS W/CONTRAST: CPT

## 2021-05-05 PROCEDURE — 65610000006 HC RM INTENSIVE CARE

## 2021-05-05 PROCEDURE — 82330 ASSAY OF CALCIUM: CPT

## 2021-05-05 PROCEDURE — 85610 PROTHROMBIN TIME: CPT

## 2021-05-05 PROCEDURE — 99223 1ST HOSP IP/OBS HIGH 75: CPT | Performed by: INTERNAL MEDICINE

## 2021-05-05 PROCEDURE — P9045 ALBUMIN (HUMAN), 5%, 250 ML: HCPCS | Performed by: PHYSICIAN ASSISTANT

## 2021-05-05 PROCEDURE — 82962 GLUCOSE BLOOD TEST: CPT

## 2021-05-05 PROCEDURE — 74011000636 HC RX REV CODE- 636

## 2021-05-05 PROCEDURE — 99291 CRITICAL CARE FIRST HOUR: CPT | Performed by: REGISTERED NURSE

## 2021-05-05 PROCEDURE — P9016 RBC LEUKOCYTES REDUCED: HCPCS

## 2021-05-05 PROCEDURE — 80048 BASIC METABOLIC PNL TOTAL CA: CPT

## 2021-05-05 PROCEDURE — 74011000250 HC RX REV CODE- 250: Performed by: RADIOLOGY

## 2021-05-05 PROCEDURE — 74011250636 HC RX REV CODE- 250/636

## 2021-05-05 PROCEDURE — C9113 INJ PANTOPRAZOLE SODIUM, VIA: HCPCS | Performed by: REGISTERED NURSE

## 2021-05-05 PROCEDURE — 74011000636 HC RX REV CODE- 636: Performed by: PHYSICIAN ASSISTANT

## 2021-05-05 PROCEDURE — 83036 HEMOGLOBIN GLYCOSYLATED A1C: CPT

## 2021-05-05 PROCEDURE — 86923 COMPATIBILITY TEST ELECTRIC: CPT

## 2021-05-05 PROCEDURE — 74011000258 HC RX REV CODE- 258: Performed by: INTERNAL MEDICINE

## 2021-05-05 PROCEDURE — 74011250636 HC RX REV CODE- 250/636: Performed by: EMERGENCY MEDICINE

## 2021-05-05 PROCEDURE — 74011000250 HC RX REV CODE- 250: Performed by: REGISTERED NURSE

## 2021-05-05 PROCEDURE — 2709999900 HC NON-CHARGEABLE SUPPLY

## 2021-05-05 PROCEDURE — 85027 COMPLETE CBC AUTOMATED: CPT

## 2021-05-05 PROCEDURE — 36430 TRANSFUSION BLD/BLD COMPNT: CPT

## 2021-05-05 PROCEDURE — C9113 INJ PANTOPRAZOLE SODIUM, VIA: HCPCS | Performed by: INTERNAL MEDICINE

## 2021-05-05 PROCEDURE — 36415 COLL VENOUS BLD VENIPUNCTURE: CPT

## 2021-05-05 PROCEDURE — 80053 COMPREHEN METABOLIC PANEL: CPT

## 2021-05-05 PROCEDURE — 86900 BLOOD TYPING SEROLOGIC ABO: CPT

## 2021-05-05 PROCEDURE — 74011000258 HC RX REV CODE- 258: Performed by: PHYSICIAN ASSISTANT

## 2021-05-05 PROCEDURE — 85025 COMPLETE CBC W/AUTO DIFF WBC: CPT

## 2021-05-05 PROCEDURE — 74011000250 HC RX REV CODE- 250: Performed by: INTERNAL MEDICINE

## 2021-05-05 PROCEDURE — 30233N1 TRANSFUSION OF NONAUTOLOGOUS RED BLOOD CELLS INTO PERIPHERAL VEIN, PERCUTANEOUS APPROACH: ICD-10-PCS | Performed by: RADIOLOGY

## 2021-05-05 PROCEDURE — 37244 VASC EMBOLIZE/OCCLUDE BLEED: CPT

## 2021-05-05 RX ORDER — ALBUMIN HUMAN 50 G/1000ML
25 SOLUTION INTRAVENOUS ONCE
Status: COMPLETED | OUTPATIENT
Start: 2021-05-05 | End: 2021-05-05

## 2021-05-05 RX ORDER — ACETAMINOPHEN 650 MG/1
650 SUPPOSITORY RECTAL
Status: DISCONTINUED | OUTPATIENT
Start: 2021-05-05 | End: 2021-05-08 | Stop reason: HOSPADM

## 2021-05-05 RX ORDER — SODIUM CHLORIDE 9 MG/ML
250 INJECTION, SOLUTION INTRAVENOUS AS NEEDED
Status: DISCONTINUED | OUTPATIENT
Start: 2021-05-05 | End: 2021-05-07

## 2021-05-05 RX ORDER — SODIUM CHLORIDE 0.9 % (FLUSH) 0.9 %
5-40 SYRINGE (ML) INJECTION AS NEEDED
Status: DISCONTINUED | OUTPATIENT
Start: 2021-05-05 | End: 2021-05-08 | Stop reason: HOSPADM

## 2021-05-05 RX ORDER — SODIUM CHLORIDE 0.9 % (FLUSH) 0.9 %
5-40 SYRINGE (ML) INJECTION EVERY 8 HOURS
Status: DISCONTINUED | OUTPATIENT
Start: 2021-05-05 | End: 2021-05-08 | Stop reason: HOSPADM

## 2021-05-05 RX ORDER — HEPARIN SODIUM 200 [USP'U]/100ML
INJECTION, SOLUTION INTRAVENOUS
Status: COMPLETED
Start: 2021-05-05 | End: 2021-05-05

## 2021-05-05 RX ORDER — SODIUM CHLORIDE 0.9 % (FLUSH) 0.9 %
5-40 SYRINGE (ML) INJECTION EVERY 8 HOURS
Status: DISCONTINUED | OUTPATIENT
Start: 2021-05-05 | End: 2021-05-06

## 2021-05-05 RX ORDER — DEXTROSE 50 % IN WATER (D50W) INTRAVENOUS SYRINGE
25-50 AS NEEDED
Status: DISCONTINUED | OUTPATIENT
Start: 2021-05-05 | End: 2021-05-08 | Stop reason: HOSPADM

## 2021-05-05 RX ORDER — CEFAZOLIN SODIUM 2 G/50ML
2 SOLUTION INTRAVENOUS ONCE
Status: COMPLETED | OUTPATIENT
Start: 2021-05-05 | End: 2021-05-05

## 2021-05-05 RX ORDER — MAGNESIUM SULFATE 100 %
4 CRYSTALS MISCELLANEOUS AS NEEDED
Status: DISCONTINUED | OUTPATIENT
Start: 2021-05-05 | End: 2021-05-08 | Stop reason: HOSPADM

## 2021-05-05 RX ORDER — FENTANYL CITRATE 50 UG/ML
12.5-5 INJECTION, SOLUTION INTRAMUSCULAR; INTRAVENOUS
Status: DISCONTINUED | OUTPATIENT
Start: 2021-05-05 | End: 2021-05-06

## 2021-05-05 RX ORDER — MIDAZOLAM HYDROCHLORIDE 1 MG/ML
.5-2 INJECTION, SOLUTION INTRAMUSCULAR; INTRAVENOUS
Status: DISCONTINUED | OUTPATIENT
Start: 2021-05-05 | End: 2021-05-06

## 2021-05-05 RX ORDER — IODIXANOL 320 MG/ML
200 INJECTION, SOLUTION INTRAVASCULAR
Status: COMPLETED | OUTPATIENT
Start: 2021-05-05 | End: 2021-05-05

## 2021-05-05 RX ORDER — IODIXANOL 320 MG/ML
100 INJECTION, SOLUTION INTRAVASCULAR
Status: COMPLETED | OUTPATIENT
Start: 2021-05-05 | End: 2021-05-05

## 2021-05-05 RX ORDER — INSULIN LISPRO 100 [IU]/ML
INJECTION, SOLUTION INTRAVENOUS; SUBCUTANEOUS EVERY 6 HOURS
Status: DISCONTINUED | OUTPATIENT
Start: 2021-05-05 | End: 2021-05-07

## 2021-05-05 RX ORDER — ACETAMINOPHEN 325 MG/1
650 TABLET ORAL
Status: DISCONTINUED | OUTPATIENT
Start: 2021-05-05 | End: 2021-05-08 | Stop reason: HOSPADM

## 2021-05-05 RX ORDER — SODIUM CHLORIDE 0.9 % (FLUSH) 0.9 %
5-40 SYRINGE (ML) INJECTION AS NEEDED
Status: DISCONTINUED | OUTPATIENT
Start: 2021-05-05 | End: 2021-05-06

## 2021-05-05 RX ORDER — LIDOCAINE HYDROCHLORIDE 10 MG/ML
30 INJECTION, SOLUTION EPIDURAL; INFILTRATION; INTRACAUDAL; PERINEURAL ONCE
Status: COMPLETED | OUTPATIENT
Start: 2021-05-05 | End: 2021-05-05

## 2021-05-05 RX ADMIN — FENTANYL CITRATE 50 MCG: 50 INJECTION, SOLUTION INTRAMUSCULAR; INTRAVENOUS at 14:40

## 2021-05-05 RX ADMIN — MIDAZOLAM 1 MG: 1 INJECTION INTRAMUSCULAR; INTRAVENOUS at 13:50

## 2021-05-05 RX ADMIN — PHYTONADIONE 10 MG: 10 INJECTION, EMULSION INTRAMUSCULAR; INTRAVENOUS; SUBCUTANEOUS at 07:08

## 2021-05-05 RX ADMIN — CALCIUM GLUCONATE 1 G: 98 INJECTION, SOLUTION INTRAVENOUS at 23:49

## 2021-05-05 RX ADMIN — Medication 10 ML: at 15:05

## 2021-05-05 RX ADMIN — ALBUMIN (HUMAN) 25 G: 12.5 INJECTION, SOLUTION INTRAVENOUS at 10:47

## 2021-05-05 RX ADMIN — GLUCAGON HYDROCHLORIDE 1 MG: KIT at 14:43

## 2021-05-05 RX ADMIN — LIDOCAINE HYDROCHLORIDE 30 ML: 10 INJECTION, SOLUTION EPIDURAL; INFILTRATION; INTRACAUDAL; PERINEURAL at 13:50

## 2021-05-05 RX ADMIN — FENTANYL CITRATE 50 MCG: 50 INJECTION, SOLUTION INTRAMUSCULAR; INTRAVENOUS at 13:50

## 2021-05-05 RX ADMIN — SODIUM CHLORIDE 40 MG: 9 INJECTION, SOLUTION INTRAMUSCULAR; INTRAVENOUS; SUBCUTANEOUS at 10:30

## 2021-05-05 RX ADMIN — SODIUM CHLORIDE 40 MG: 9 INJECTION, SOLUTION INTRAMUSCULAR; INTRAVENOUS; SUBCUTANEOUS at 21:21

## 2021-05-05 RX ADMIN — SODIUM CHLORIDE 1000 ML: 900 INJECTION, SOLUTION INTRAVENOUS at 01:53

## 2021-05-05 RX ADMIN — Medication 10 ML: at 21:22

## 2021-05-05 RX ADMIN — Medication 10 ML: at 10:31

## 2021-05-05 RX ADMIN — IODIXANOL 200 ML: 320 INJECTION, SOLUTION INTRAVASCULAR at 14:00

## 2021-05-05 RX ADMIN — MIDAZOLAM 1 MG: 1 INJECTION INTRAMUSCULAR; INTRAVENOUS at 14:40

## 2021-05-05 RX ADMIN — CEFAZOLIN SODIUM 2 G: 2 SOLUTION INTRAVENOUS at 13:50

## 2021-05-05 RX ADMIN — Medication 10 ML: at 22:00

## 2021-05-05 RX ADMIN — Medication: at 12:00

## 2021-05-05 RX ADMIN — IODIXANOL 100 ML: 320 INJECTION, SOLUTION INTRAVASCULAR at 09:30

## 2021-05-05 NOTE — H&P
Hospitalist Admission History and Physical    NAME:  Jacquelin Leone   :   1945   MRN:   [id number]     PCP:  David To MD  Date/Time:  2021 7:38 AM  Subjective:   CHIEF COMPLAINT:  Rectal bleeding    HISTORY OF PRESENT ILLNESS:     Perry Whitehead is a 66 y.o.   male with previous history of GI bleed on coumadin who presents with above-stated reports. He states he had onset of rectal bleeding on 11 AM on date of presentation. He reports having had 4-5 episodes of BRBPR. He denied melena. Denied chest pain or shortness of breath. Initially seen at [facility] but transferred here I believe due to unavailability of GI consultants at that facility. His hemoglobin was around 9 at [facility] with an INR of around 3. In the ED here he subsequently became hypotensive at that time he had significant amount of rectal bleeding per nursing report. His hemoglobin also dropped from around 9 to around 7 on repeat check care. His blood pressure responded to fluid bolus. I have consulted the ICU team given his tenuous status. He was seen in the ER with daughter present at bedside. Overall he is a poor historian and most of his history is obtained by talking to daughter and looking through his chart.         Past Medical History:   Diagnosis Date    Anemia     Atrial fibrillation (HCC)     BPH (benign prostatic hypertrophy)     CAD (coronary artery disease)     CHF (congestive heart failure) (HCC)     Diabetes (HCC)     Gout     Hyperlipidemia     Hypertension     Pacemaker     Vitamin D deficiency         Past Surgical History:   Procedure Laterality Date    COLONOSCOPY N/A 4/3/2017    COLONOSCOPY performed by Emilie Olszewski, MD at [facility] ENDOSCOPY    HX OTHER SURGICAL      colonoscopy    HX PACEMAKER      defibrillator    HX PACEMAKER PLACEMENT      HX TONSILLECTOMY         Social History     Tobacco Use    Smoking status: Former Smoker     Quit date: 1981     Years since quittin.7    Smokeless tobacco: Never Used   Substance Use Topics    Alcohol use: No        No family history on file. Allergies   Allergen Reactions    Tradjenta [Linagliptin] Anaphylaxis    Ace Inhibitors Angioedema        Prior to Admission Medications   Prescriptions Last Dose Informant Patient Reported? Taking?   allopurinol (ZYLOPRIM) 300 mg tablet   Yes No   Sig: Take 150 mg by mouth daily. ascorbic acid, vitamin C, (VITAMIN C) 1,000 mg tablet   Yes No   Sig: Take 1,000 mg by mouth. aspirin delayed-release 81 mg tablet   Yes No   Si mg.   atorvastatin (LIPITOR) 40 mg tablet   Yes No   Sig: atorvastatin 40 mg tablet   carvedilol (COREG) 25 mg tablet   Yes No   Sig: Take 25 mg by mouth two (2) times daily (with meals). cholecalciferol (VITAMIN D3) 1,000 unit tablet   Yes No   Sig: Take 1,000 Units by mouth daily. digoxin (LANOXIN) 0.25 mg tablet   Yes No   Sig: TAKE ONE-HALF TABLET BY  MOUTH ONCE DAILY   doxycycline (ADOXA) 100 mg tablet   No No   Sig: Take 1 Tab by mouth two (2) times a day.   ezetimibe (ZETIA) 10 mg tablet   Yes No   Sig: Take  by mouth. ferrous sulfate 325 mg (65 mg iron) tablet   Yes No   Si mg.   finasteride (PROSCAR) 5 mg tablet   No No   Sig: Take 1 Tab by mouth daily. furosemide (LASIX) 40 mg tablet   Yes No   Sig: furosemide 40 mg tablet   losartan (COZAAR) 100 mg tablet   Yes No   Sig: Take 100 mg by mouth daily. metFORMIN (GLUCOPHAGE) 1,000 mg tablet   Yes No   Sig: Take 1,000 mg by mouth two (2) times daily (with meals). multivitamins-minerals-lutein (MEN'S CENTRUM SILVER WITH LUTEIN) tab tablet   Yes No   Sig: Take 1 Tab by Mouth Once a Day. pantoprazole (PROTONIX) 40 mg tablet   No No   Sig: Take 1 Tab by mouth two (2) times a day. spironolactone (ALDACTONE) 25 mg tablet   Yes No   Sig: Take  by mouth daily. tamsulosin (FLOMAX) 0.4 mg capsule   No No   Sig: Take 2 Caps by mouth daily (after dinner).    vit B complex no.12/niacin,B3, (VITAMIN B COMPLEX NO.12-NIACIN PO)   Yes No   Sig: Take  by Mouth.   warfarin (COUMADIN) 10 mg tablet   Yes No   Sig: Take 1 tablet by mouth daily or as directed by [facility] [phone]      Facility-Administered Medications: None     REVIEW OF SYSTEMS:  Please see above otw 10 point ROS checked and negative. Objective:   VITALS:    Visit Vitals  BP (!) 92/56   Pulse 71   Temp 98.3 °F (36.8 °C)   Resp 14   SpO2 100%     Temp (24hrs), Av.3 °F (36.8 °C), Min:98.3 °F (36.8 °C), Max:98.3 °F (36.8 °C)      PHYSICAL EXAM:   General:    Alert, cooperative, no distress, appears stated age. Head:   Normocephalic, without obvious abnormality, atraumatic. Eyes:   Conjunctivae clear, anicteric sclerae. Pupils are equal  Nose:  Nares normal. No drainage or sinus tenderness. Throat:    Lips, mucosa, and tongue normal.  No Thrush  Neck:  Supple, symmetrical,  no adenopathy, thyroid: non tender    no carotid bruit and no JVD. Back:    Symmetric,  No CVA tenderness. Lungs:   Clear to auscultation bilaterally. No Wheezing or Rhonchi. No rales. Chest wall:  No tenderness or deformity. No Accessory muscle use. Heart:   Regular rate and rhythm,  no murmur, rub or gallop. Abdomen:   Soft, non-tender. Not distended. Bowel sounds normal. No masses  Extremities: Extremities normal, atraumatic, No cyanosis. No edema. No clubbing  Skin:     Texture, turgor normal. No rashes or lesions. Not Jaundiced  Lymph nodes: Cervical, supraclavicular normal.  Psych:  Good insight. Not depressed. Not anxious or agitated. Neurologic: EOMs intact. No facial asymmetry. No aphasia or slurred speech. Normal   strength, Alert and oriented X 3.        LAB DATA REVIEWED:    No components found for: GLPOC  Recent Labs     21  0400      K 4.3      CO2 27   BUN 51*   CREA 1.82*   *   CA 8.4*   ALB 3.1*   WBC 5.1   HGB 7.2*   HCT 22.5*            IMAGING RESULTS:  No imaging results available here    Assessment/Plan:      Active Problems:    Lower GI bleed (5/5/2021)    -On Coumadin with INR of 3.26 on 5/4  -Hemodynamic instability including hypotension due to GI bleed, some response of blood pressure to IV fluids. ICU consulted. -Creat elevation of unclear acuity suspect PETE from pre renal azotemia given low bp's    HISTORY OF:  -Heart failure with reduced ejection fraction ejection fraction of 45% on echo done February 2015. Status post AICD  -Type 2 diabetes mellitus on Metformin  -Hypertension  -A. fib on Coumadin  -Gout  -Dyslipidemia  -Coronary artery disease  ___________________________________________________  PLAN:    -IV vitamin K, PRBC transfusions, FFP. Hold Coumadin. Case discussed with both intensivist and gastroenterology specialist  -Volume support with IV fluids also  -ICU admission consideration  -Hydrate and follow renal function, hold ARB, spironolactone, lasix, metformin, avoid nephrotoxic drugs, if no improvement consider further work up to include renal us and nephrology consult  -Hold all blood pressure medications  -Follow hgb and transfuse as needed  -Caution with volume resuscitation given his reduced ejection fraction  -Hold metformin, corrective insulin  -NPO  -Blood sugars reassuring for now, will only use corrective insulin given range of bs and NPO status. Cont to re assess and if bs consistently >180 consider adding a long acting insulin.   Risk of deterioration:  []Low    []Moderate  [x]High              Prophylaxis:  []Lovenox  []Coumadin  []Hep SQ  [x]SCDs  []H2B/PPI    Disposition:  [x]Home w/ Family   []HH PT,OT,RN   []SNF/LTC   []SAH/Rehab    Discussed Code Status:    [x]Full Code      []DNR     ___________________________________________________    Care Plan discussed with:    [x]Patient   [x]Family    []ED Care Manager  [x]ED Doc   [x]Specialist :    Total Time Coordinating Admission:      minutes    []Total Critical Care Time: ___________________________________________________  Admitting Physician: Chele Valenzuela MD

## 2021-05-05 NOTE — PROGRESS NOTES
Problem: Falls - Risk of  Goal: *Absence of Falls  Description: Document Nila Sheila Fall Risk and appropriate interventions in the flowsheet.   Outcome: Progressing Towards Goal  Variance Patient Condition  Impact: Moderate  Note: Fall Risk Interventions:                 Elimination Interventions: Bed/chair exit alarm, Call light in reach, Patient to call for help with toileting needs, Stay With Me (per policy), Toileting schedule/hourly rounds, Urinal in reach              Problem: Upper and Lower GI Bleed: Day 1  Goal: Off Pathway (Use only if patient is Off Pathway)  Outcome: Progressing Towards Goal

## 2021-05-05 NOTE — ROUTINE PROCESS
TRANSFER - OUT REPORT:    Verbal report given to [place] on Jorge Choudhury  being transferred to [facility] routine progression of care       Report consisted of patients Situation, Background, Assessment and   Recommendations(SBAR). Information from the following report(s) SBAR, Kardex, Procedure Summary, Intake/Output, MAR and Recent Results was reviewed with the receiving nurse. Lines:   Peripheral IV 05/05/21 Right Antecubital (Active)   Site Assessment Clean, dry, & intact 05/05/21 1202   Phlebitis Assessment 0 05/05/21 1202   Infiltration Assessment 0 05/05/21 1202   Dressing Status Clean, dry, & intact 05/05/21 1202   Dressing Type Tape;Transparent 05/05/21 1202   Hub Color/Line Status Pink; Infusing;Patent 05/05/21 1202   Action Taken Open ports on tubing capped 05/05/21 1202   Alcohol Cap Used Yes 05/05/21 1202       Peripheral IV 05/05/21 Left Antecubital (Active)   Site Assessment Clean, dry, & intact 05/05/21 1202   Phlebitis Assessment 0 05/05/21 1202   Infiltration Assessment 0 05/05/21 1202   Dressing Status Clean, dry, & intact 05/05/21 1202   Dressing Type Tape;Transparent 05/05/21 1202   Hub Color/Line Status Pink; Infusing;Patent 05/05/21 1202   Action Taken Open ports on tubing capped 05/05/21 1202   Alcohol Cap Used Yes 05/05/21 1202       Peripheral IV 05/05/21 Anterior;Proximal;Right Forearm (Active)   Site Assessment Clean, dry, & intact 05/05/21 1202   Phlebitis Assessment 0 05/05/21 1202   Infiltration Assessment 0 05/05/21 1202   Dressing Status Clean, dry, & intact 05/05/21 1202   Dressing Type Tape;Transparent 05/05/21 1202   Hub Color/Line Status Green; Infusing 05/05/21 1202   Action Taken Open ports on tubing capped 05/05/21 1202   Alcohol Cap Used No 05/05/21 1202        Opportunity for questions and clarification was provided. Instructed primary nurse patient had to lay flat for 6 hours and bed rest overnight.     Patient transported with:   Monitor  Registered Nurse   ICU nurse

## 2021-05-05 NOTE — ROUTINE PROCESS
TRANSFER - IN REPORT:    Verbal report received from Shanna ARAUJO(name) on Alfa Adam  being received from ED(unit) for routine progression of care      Report consisted of patients Situation, Background, Assessment and   Recommendations(SBAR). Information from the following report(s) Kardex, ED Summary, Intake/Output, MAR, Recent Results, Cardiac Rhythm SR and Quality Measures was reviewed with the receiving nurse. Opportunity for questions and clarification was provided. Assessment completed upon patients arrival to unit and care assumed.

## 2021-05-05 NOTE — ED NOTES
Patient had a watery bloody stool. Provided perineal care. Changed linens. Patient has no new complaints. No obvious signs of distress noted.

## 2021-05-05 NOTE — PROGRESS NOTES
Patient evaluated in f/u from admission earlier this am.  Repeat H&H 6.2  Radiology has called me with wet read from his CTA which shows a distal descending colon bleed. Getting 1 unit of blood now, one more pending.   Needs serial H&H  Stat INR   - has already been given Vit K.   - 2 units of FFP ordered    Discussed with IR   - will take to angio for embolization of bleed    Addendum:   Discussed with ICU team. They will assume care of the patient

## 2021-05-05 NOTE — PROGRESS NOTES
Reason for Admission:  Lower GI bleed [K92.2]                 RUR Score:    22%           Plan for utilizing home health: To be determined                    Likelihood of Readmission:   Moderate                         Do you (patient/family) have any concerns for transition/discharge?  no    Transition of Care Plan:       Initial assessment completed with Nahomy Pickett, daughter in waiting area. Cognitive status of patient: unable to assess. Patient is in angio for a procedure. Face sheet information confirmed:  yes. Nahomy Pickett, daughter  ([phone])  needed information. This patient lives in a single family home with  2 daughters. Patient was able to navigate steps as needed. Prior to hospitalization, patient was considered to be independent with ADLs/IADLS : yes . Patient has a current ACP document on file: no      Healthcare Decision Maker:  no      The patient's family  will be available to transport patient home upon discharge. The patient's daughter reported that already has [address] [facility]  medical equipment available in the home. Patient is not currently active with home health. Patient has not stayed in a skilled nursing facility or rehab. This patient is on dialysis :no       Freedom of choice signed: no.     Currently, the discharge plan is Home with family assistance    CM will continue to monitor and assist with transitional needs. The patient's daughter stated that he can obtain his medications from the pharmacy, and take his medications as directed. Patient's current insurance is  [employer]  PCP Verified by CM: Yes  Mode of Transport at Discharge: Self  Transition of Care Consult (CM Consult): Discharge Planning  Discharge Durable Medical Equipment: No  Physical Therapy Consult: No  Occupational Therapy Consult: No  Speech Therapy Consult: No  Current Support Network: Own Home, Other(2 daughters live in home. )  Confirm Follow Up Transport: Self  Discharge Location  Discharge Placement: Home with family assistance        VERNA Lee Head, BSN, RN  Pager # [phone]  Care Manager

## 2021-05-05 NOTE — ROUTINE PROCESS
Bedside and Verbal shift change report given to Peter Albarran RN (oncoming nurse) by Dominik Banegas RN (offgoing nurse). Report included the following information SBAR, ED Summary, Procedure Summary, Intake/Output, MAR, Recent Results, Cardiac Rhythm V-pacing, Procedure Verification and Quality Measures.

## 2021-05-05 NOTE — ED PROVIDER NOTES
EMERGENCY DEPARTMENT HISTORY AND PHYSICAL EXAM    11:27 PM      Date: 5/4/2021  Patient Name: Jenifer Duggan    History of Presenting Illness     Chief Complaint   Patient presents with    Melena         History Provided By: patient    Additional History (Context): Jenifer Duggan is a 66 y.o. male presents with lower GI bleeding started at 6 AM today has had about 4-5 episodes of bright red blood he is on Coumadin for atrial fibrillation. He presented initially to [facility] ER and was transferred over here. Dr. Oscar De La Fuente GI is aware. At the time of my examination he has no pain. Rosa Maria Rothman PCP: Radha Rodriguez MD    Chief Complaint:   Duration:    Timing:    Location:   Quality:   Severity:   Modifying Factors:   Associated Symptoms:       Current Outpatient Medications   Medication Sig Dispense Refill    digoxin (LANOXIN) 0.25 mg tablet TAKE ONE-HALF TABLET BY  MOUTH ONCE DAILY      finasteride (PROSCAR) 5 mg tablet Take 1 Tab by mouth daily. 90 Tab 3    tamsulosin (FLOMAX) 0.4 mg capsule Take 2 Caps by mouth daily (after dinner). 180 Cap 3    furosemide (LASIX) 40 mg tablet furosemide 40 mg tablet      atorvastatin (LIPITOR) 40 mg tablet atorvastatin 40 mg tablet      warfarin (COUMADIN) 10 mg tablet Take 1 tablet by mouth daily or as directed by [facility] [phone]      doxycycline (ADOXA) 100 mg tablet Take 1 Tab by mouth two (2) times a day. 30 Tab 0    aspirin delayed-release 81 mg tablet 81 mg.      ferrous sulfate 325 mg (65 mg iron) tablet 325 mg.      multivitamins-minerals-lutein (MEN'S CENTRUM SILVER WITH LUTEIN) tab tablet Take 1 Tab by Mouth Once a Day.  vit B complex no.12/niacin,B3, (VITAMIN B COMPLEX NO.12-NIACIN PO) Take  by Mouth.  pantoprazole (PROTONIX) 40 mg tablet Take 1 Tab by mouth two (2) times a day. 60 Tab 1    cholecalciferol (VITAMIN D3) 1,000 unit tablet Take 1,000 Units by mouth daily.       ascorbic acid, vitamin C, (VITAMIN C) 1,000 mg tablet Take 1,000 mg by mouth.  carvedilol (COREG) 25 mg tablet Take 25 mg by mouth two (2) times daily (with meals).  metFORMIN (GLUCOPHAGE) 1,000 mg tablet Take 1,000 mg by mouth two (2) times daily (with meals).  allopurinol (ZYLOPRIM) 300 mg tablet Take 150 mg by mouth daily.  spironolactone (ALDACTONE) 25 mg tablet Take  by mouth daily.  losartan (COZAAR) 100 mg tablet Take 100 mg by mouth daily.  ezetimibe (ZETIA) 10 mg tablet Take  by mouth. Past History     Past Medical History:  Past Medical History:   Diagnosis Date    Anemia     Atrial fibrillation (HCC)     BPH (benign prostatic hypertrophy)     CAD (coronary artery disease)     CHF (congestive heart failure) (HCC)     Diabetes (HCC)     Gout     Hyperlipidemia     Hypertension     Pacemaker     Vitamin D deficiency        Past Surgical History:  Past Surgical History:   Procedure Laterality Date    COLONOSCOPY N/A 4/3/2017    COLONOSCOPY performed by Rajni Gomez MD at [facility]    HX OTHER SURGICAL      colonoscopy    HX PACEMAKER      defibrillator    HX PACEMAKER PLACEMENT      HX TONSILLECTOMY         Family History:  No family history on file. Social History:  Social History     Tobacco Use    Smoking status: Former Smoker     Quit date: 1981     Years since quittin.7    Smokeless tobacco: Never Used   Substance Use Topics    Alcohol use: No    Drug use: No       Allergies: Allergies   Allergen Reactions    Tradjenta [Linagliptin] Anaphylaxis    Ace Inhibitors Angioedema         Review of Systems     Review of Systems   Constitutional: Negative for diaphoresis and fever. HENT: Negative for congestion and sore throat. Eyes: Negative for pain and itching. Respiratory: Negative for cough and shortness of breath. Cardiovascular: Negative for chest pain and palpitations. Gastrointestinal: Negative for abdominal pain and diarrhea.    Endocrine: Negative for polydipsia and polyuria. Genitourinary: Negative for dysuria and hematuria. Musculoskeletal: Negative for arthralgias and myalgias. Skin: Negative for rash and wound. Neurological: Negative for seizures and syncope. Hematological: Does not bruise/bleed easily. Psychiatric/Behavioral: Negative for agitation and hallucinations. Physical Exam       Patient Vitals for the past 12 hrs:   Temp Pulse Resp BP SpO2   05/04/21 2338 98.3 °F (36.8 °C) 79 17 113/64 100 %       Physical Exam  Vitals signs and nursing note reviewed. Constitutional:       Appearance: He is well-developed. HENT:      Head: Normocephalic and atraumatic. Eyes:      General: No scleral icterus. Conjunctiva/sclera: Conjunctivae normal.   Neck:      Musculoskeletal: Normal range of motion and neck supple. Vascular: No JVD. Cardiovascular:      Rate and Rhythm: Normal rate and regular rhythm. Pulmonary:      Effort: Pulmonary effort is normal. No respiratory distress. Musculoskeletal: Normal range of motion. Skin:     General: Skin is warm and dry. Neurological:      Mental Status: He is alert. Psychiatric:         Thought Content: Thought content normal.         Judgment: Judgment normal.           Diagnostic Study Results   Labs -  No results found for this or any previous visit (from the past 12 hour(s)). Radiologic Studies -   No orders to display     No results found. Medications ordered:   Medications - No data to display      Medical Decision Making   Initial Medical Decision Making and DDx: We will monitor him here in the ER in the morning GI will assess and possible scope. Hemoglobin 9.4 at [facility] ER    ED Course: Progress Notes, Reevaluation, and Consults:  ED Course as of Jun 01 1047   Wed May 05, 2021   0152 Some soft blood pressures, will order a liter of fluids.   Patient's not complaining of any lightheadedness is not tachycardic he is not diaphoretic his mentation is good    [CB]      ED Course User Index  [CB] Tripp Roper MD         I am the first provider for this patient. I reviewed the vital signs, available nursing notes, past medical history, past surgical history, family history and social history. Patient Vitals for the past 12 hrs:   Temp Pulse Resp BP SpO2   05/04/21 2338 98.3 °F (36.8 °C) 79 17 113/64 100 %       Vital Signs-Reviewed the patient's vital signs. Pulse Oximetry Analysis, Cardiac Monitor, 12 lead ekg:      Interpreted by the EP. Records Reviewed: Nursing notes reviewed (Time of Review: 11:27 PM)    Procedures:   Critical Care Time:   Aspirin: (was aspirin given for stroke?)    Diagnosis     Clinical Impression: No diagnosis found. Disposition:       Follow-up Information    None          Patient's Medications   Start Taking    No medications on file   Continue Taking    ALLOPURINOL (ZYLOPRIM) 300 MG TABLET    Take 150 mg by mouth daily. ASCORBIC ACID, VITAMIN C, (VITAMIN C) 1,000 MG TABLET    Take 1,000 mg by mouth. ASPIRIN DELAYED-RELEASE 81 MG TABLET    81 mg. ATORVASTATIN (LIPITOR) 40 MG TABLET    atorvastatin 40 mg tablet    CARVEDILOL (COREG) 25 MG TABLET    Take 25 mg by mouth two (2) times daily (with meals). CHOLECALCIFEROL (VITAMIN D3) 1,000 UNIT TABLET    Take 1,000 Units by mouth daily. DIGOXIN (LANOXIN) 0.25 MG TABLET    TAKE ONE-HALF TABLET BY  MOUTH ONCE DAILY    DOXYCYCLINE (ADOXA) 100 MG TABLET    Take 1 Tab by mouth two (2) times a day. EZETIMIBE (ZETIA) 10 MG TABLET    Take  by mouth. FERROUS SULFATE 325 MG (65 MG IRON) TABLET    325 mg. FINASTERIDE (PROSCAR) 5 MG TABLET    Take 1 Tab by mouth daily. FUROSEMIDE (LASIX) 40 MG TABLET    furosemide 40 mg tablet    LOSARTAN (COZAAR) 100 MG TABLET    Take 100 mg by mouth daily. METFORMIN (GLUCOPHAGE) 1,000 MG TABLET    Take 1,000 mg by mouth two (2) times daily (with meals).     MULTIVITAMINS-MINERALS-LUTEIN (MEN'S CENTRUM SILVER WITH LUTEIN) TAB TABLET    Take 1 Tab by Mouth Once a Day. PANTOPRAZOLE (PROTONIX) 40 MG TABLET    Take 1 Tab by mouth two (2) times a day. SPIRONOLACTONE (ALDACTONE) 25 MG TABLET    Take  by mouth daily. TAMSULOSIN (FLOMAX) 0.4 MG CAPSULE    Take 2 Caps by mouth daily (after dinner). VIT B COMPLEX NO.12/NIACIN,B3, (VITAMIN B COMPLEX NO.12-NIACIN PO)    Take  by Mouth.     WARFARIN (COUMADIN) 10 MG TABLET    Take 1 tablet by mouth daily or as directed by [facility] [phone]   These Medications have changed    No medications on file   Stop Taking    No medications on file     _______________________________    Notes:    Omero Antonio MD using Dragon dictation      _______________________________

## 2021-05-05 NOTE — CONSULTS
Interventional Radiology Consult Note    Patient: Evan Melgar               Sex: male          DOA: 5/4/2021       YOB: 1945      Age:  66 y.o.        LOS:  LOS: 0 days              Assessment     Active Problems:    CHF (congestive heart failure) ([facility] ()      Type 2 diabetes with nephropathy ([facility] (4/24/2018)      Acute GI bleeding (5/5/2021)      Hypovolemia (5/5/2021)      Acute blood loss anemia (5/5/2021)      CAD (coronary artery disease) ()      Thoracic aortic aneurysm without rupture ([facility] ()      Evan Melgar is a 66 y.o. male with a history of gastric ulcer and diverticulosis, chronic pericardial effusion, AFib on warfarin presenting with new onset melena and associated hypotension. Plan   Case and images reviewed by Dr. Sergo Bowman. Discussions held between Sandy Jones and Dr. Cheril Hatchet. Given that the patient has a source of active extravasation visualized on CTA this morning, will plan for image-guided mesenteric angiography and embolization with moderate sedation as IR schedule allows. Patient to remain NPO with blood thinning medications held. Continue to correct coagulopathy - appreciate ICU team management    Thank you,  [place], [place]  [id number]    HPI:     Evan Melgar is a 66 y.o. male who has been seen in evaluation of GI bleeding at the request of Dr. Cheril Hatchet. He was transferred from [facility] per patient request due to being previously treated by GI Dr. Elizabeth Lomeli here at [facility]. Patient presented to the ER due to melena beginning at 11 AM yesterday. CTA obtained this morning demonstrates source of bleeding from the anterior descending colon. Yesterday, labs were significant for INR 3.26, H&H 9.4/29.5 (appears to be his baseline), Crt 1.9. Today, labs show downtrending H&H at 6.1/19.3, no repeat INR available yet, and creatinine improving at 1.58. The patient has not yet received any blood products.   He has received one dose of vitamin K and is on albumin drip. The patient was hypotensive this morning with systolic pressure charted in the 60s around 5 AM.  He appears to have responded well to fluid bolus. Once again hypotensive around 1030 this morning, back up to systolic of 11R on exam.    Today, the patient denies current nausea, vomiting, abdominal pain, headache or dizziness. The patient does report feeling weak and tired today. The patient states that his last dose of warfarin was Monday. Anticipated procedure was discussed in detail including risks of injury, infection, and bleeding. Specifically risk of injury to his kidneys and risk of mesenteric ischemia was discussed with the patient and his daughter at the bedside. Moderate sedation was additionally discussed including risk of lowering blood pressure, breathing rate, and heart rate. Both the patient and his daughter agreed to proceed despite these risks. Past Medical History:   Diagnosis Date    AICD (automatic cardioverter/defibrillator) present     Anemia of chronic disease     Atrial fibrillation (HCC)     BPH (benign prostatic hypertrophy)     CAD (coronary artery disease)     CHF (congestive heart failure) (HCC)     Chronic gastritis with hemorrhage     CKD (chronic kidney disease), stage III (HCC)     Current use of long term anticoagulation     [facility]A [facility]cerebral vascular accident) [facility]     Diabetes ([facility]     Former smoker     Gout     Hyperlipidemia     Hypertension     Pacemaker     Thoracic aortic aneurysm ([facility]     UGIB (upper gastrointestinal bleed)     Vitamin D deficiency     Vitamin D deficiency      Past Surgical History:   Procedure Laterality Date    COLONOSCOPY N/A 4/3/2017    COLONOSCOPY performed by Emilie Olszewski, MD at [facility]    HX OTHER SURGICAL      colonoscopy    HX PACEMAKER      defibrillator    HX PACEMAKER PLACEMENT      HX TONSILLECTOMY       No family history on file.   Social History     Socioeconomic History    Marital status:      Spouse name: Not on file    Number of children: Not on file    Years of education: Not on file    Highest education level: Not on file   Tobacco Use    Smoking status: Former Smoker     Quit date: 1981     Years since quittin.7    Smokeless tobacco: Never Used   Substance and Sexual Activity    Alcohol use: No    Drug use: No    Sexual activity: Not Currently     Prior to Admission medications    Medication Sig Start Date End Date Taking? Authorizing Provider   digoxin (LANOXIN) 0.25 mg tablet TAKE ONE-HALF TABLET BY  MOUTH ONCE DAILY 21   Provider, Historical   finasteride (PROSCAR) 5 mg tablet Take 1 Tab by mouth daily. 21   Bryan Stewart NP   tamsulosin [facility]) 0.4 mg capsule Take 2 Caps by mouth daily (after dinner). 20   Alissa Prakash MD   furosemide (LASIX) 40 mg tablet furosemide 40 mg tablet    Provider, Historical   atorvastatin (LIPITOR) 40 mg tablet atorvastatin 40 mg tablet 1/3/20   Provider, Historical   warfarin (COUMADIN) 10 mg tablet Take 1 tablet by mouth daily or as directed by [facility] [phone] 3/24/20   Provider, Historical   doxycycline (ADOXA) 100 mg tablet Take 1 Tab by mouth two (2) times a day. 3/4/19   Linda Pereira MD   aspirin delayed-release 81 mg tablet 81 mg.    Provider, Historical   ferrous sulfate 325 mg (65 mg iron) tablet 325 mg. 18   Provider, Historical   multivitamins-minerals-lutein (MEN'S CENTRUM SILVER WITH LUTEIN) tab tablet Take 1 Tab by Mouth Once a Day. Provider, Historical   vit B complex no.12/niacin,B3, (VITAMIN B COMPLEX NO.12-NIACIN PO) Take  by Mouth. Provider, Historical   pantoprazole (PROTONIX) 40 mg tablet Take 1 Tab by mouth two (2) times a day. 10/10/17   Andrew David MD   cholecalciferol (VITAMIN D3) 1,000 unit tablet Take 1,000 Units by mouth daily. Provider, Historical   ascorbic acid, vitamin C, (VITAMIN C) 1,000 mg tablet Take 1,000 mg by mouth. Provider, Historical   carvedilol (COREG) 25 mg tablet Take 25 mg by mouth two (2) times daily (with meals). Provider, Historical   metFORMIN (GLUCOPHAGE) 1,000 mg tablet Take 1,000 mg by mouth two (2) times daily (with meals). Provider, Historical   allopurinol (ZYLOPRIM) 300 mg tablet Take 150 mg by mouth daily. Provider, Historical   spironolactone (ALDACTONE) 25 mg tablet Take  by mouth daily. Provider, Historical   losartan (COZAAR) 100 mg tablet Take 100 mg by mouth daily. Provider, Historical   ezetimibe (ZETIA) 10 mg tablet Take  by mouth. Provider, Historical     Allergies   Allergen Reactions    Tradjenta [Linagliptin] Anaphylaxis    Ace Inhibitors Angioedema     Review of Systems  Pertinent items are noted in the History of Present Illness.     Physical Exam:      Visit Vitals  BP (!) 90/44   Pulse 73   Temp 98 °F (36.7 °C)   Resp 17   Wt 82.1 kg (181 lb)   SpO2 100%   BMI 27.52 kg/m²       Physical Exam:  Constitutional: Awake and alert and oriented, NAD  Respiratory: Normal work of breathing, equal chest rise and fall  Cardiovascular: RRR  Gastrointestinal: Soft NT ND  Extremities: Moves all four    Labs Reviewed:  CMP:   Lab Results   Component Value Date/Time     05/05/2021 10:22 AM    K 3.9 05/05/2021 10:22 AM     (H) 05/05/2021 10:22 AM    CO2 22 05/05/2021 10:22 AM    AGAP 8 05/05/2021 10:22 AM     (H) 05/05/2021 10:22 AM    BUN 59 (H) 05/05/2021 10:22 AM    CREA 1.58 (H) 05/05/2021 10:22 AM    GFRAA 52 (L) 05/05/2021 10:22 AM    GFRNA 43 (L) 05/05/2021 10:22 AM    CA 7.3 (L) 05/05/2021 10:22 AM    MG 1.6 05/05/2021 10:22 AM    PHOS 4.0 05/05/2021 10:22 AM    ALB 3.1 (L) 05/05/2021 04:00 AM    TP 5.6 (L) 05/05/2021 04:00 AM    GLOB 2.5 05/05/2021 04:00 AM    AGRAT 1.2 05/05/2021 04:00 AM    ALT 25 05/05/2021 04:00 AM     CBC:   Lab Results   Component Value Date/Time    WBC 5.5 05/05/2021 10:22 AM    HGB 6.1 (L) 05/05/2021 10:22 AM    HCT 19.3 (L) 05/05/2021 10:22 AM     (L) 05/05/2021 10:22 AM     COAGS: Pending

## 2021-05-05 NOTE — CONSULTS
[email or web address]  [phone]    GASTROENTEROLOGY CONSULT      Impression:   1. GI bleed - large volume hematochezia, began 11 am yesterday. - Prior history GIB in 2017 - melenea and hematochezia, gastritis and ulcer  2. A fib - on Coumadin, INR 3 at outside hospital  S/p Vit K and FFP  3. Anemia - Acute on chronic, hx HUMERA  - Hgb 9 at outside ER, now 7.2  4. CHF s/p AICD  5. DM2    Prior scopes:  [facility] 2017 - severe diverticulosis, 5 year repeat recommended, personal hx polyps  EGD 10/2017 - clean based ulcer      Plan:     1. STAT CTA to localize bleed  2. Monitor H/H, transfuse as indicated  3. S/p Vit K; PRBC, FFP ordered  4. Continue medical management per primary team  5. GI to follow      Chief Complaint: GI bleed      HPI:  Lokesh Neumann is a 66 y.o. male who I am being asked to see in consultation for an opinion regarding GI bleed in setting of chronic anticoagulant use. He is seen w/ daughter at bedside. He developed painless rectal bleeding yesterday around 11 am. Described as large volume red blood. Denies abdominal pain, nausea, vomiting, change in bowel habits, melena. Not on NSAIDs. Compliant w/ Coumadin as prescribed for A fib, states level was ok when last checked a few weeks ago. He has a prior history of GI bleed in 2017 w/ hematochezia and melena. He denies any GI concerns in the interim. This morning in ER he became hypotensive and was given PRBC, FFP, Vit K and fluid resuscitation. At present he feels modestly improved.      PMH:   Past Medical History:   Diagnosis Date    Anemia     Atrial fibrillation (HCC)     BPH (benign prostatic hypertrophy)     CAD (coronary artery disease)     CHF (congestive heart failure) (HCC)     Diabetes ([facility]     Gout     Hyperlipidemia     Hypertension     Pacemaker     Vitamin D deficiency        PSH:   Past Surgical History:   Procedure Laterality Date    COLONOSCOPY N/A 4/3/2017    COLONOSCOPY performed by Lisandra Simmons MD at [address] HX OTHER SURGICAL      colonoscopy    HX PACEMAKER      defibrillator    HX PACEMAKER PLACEMENT      HX TONSILLECTOMY         Social HX:   Social History     Socioeconomic History    Marital status:      Spouse name: Not on file    Number of children: Not on file    Years of education: Not on file    Highest education level: Not on file   Occupational History    Not on file   Social Needs    Financial resource strain: Not on file    Food insecurity     Worry: Not on file     Inability: Not on file    Transportation needs     Medical: Not on file     Non-medical: Not on file   Tobacco Use    Smoking status: Former Smoker     Quit date: 1981     Years since quittin.7    Smokeless tobacco: Never Used   Substance and Sexual Activity    Alcohol use: No    Drug use: No    Sexual activity: Not Currently   Lifestyle    Physical activity     Days per week: Not on file     Minutes per session: Not on file    Stress: Not on file   Relationships    Social connections     Talks on phone: Not on file     Gets together: Not on file     Attends [employer] service: Not on file     Active member of club or organization: Not on file     Attends meetings of clubs or organizations: Not on file     Relationship status: Not on file    Intimate partner violence     Fear of current or ex partner: Not on file     Emotionally abused: Not on file     Physically abused: Not on file     Forced sexual activity: Not on file   Other Topics Concern    Not on file   Social History Narrative    Not on file       FHX:   No family history on file.     Allergy:   Allergies   Allergen Reactions    Tradjenta [Linagliptin] Anaphylaxis    Ace Inhibitors Angioedema       Patient Active Problem List   Diagnosis Code    Hypertension I10    Diabetes ([facility] E11.9    Hyperlipidemia E78.5    Gout M10.9    Pacemaker Z95.0    Vitamin D deficiency E55.9    CHF (congestive heart failure) ([facility]) I50.9    BPH (benign prostatic hypertrophy) N40.0    Anemia D64.9    Atrial fibrillation (HCC) I48.91    GI bleed K92.2    Symptomatic anemia D64.9    Rectal bleed K62.5    Acute gastric ulcer with hemorrhage K25.0    Dieulafoy lesion (hemorrhagic) of stomach and duodenum (CODE) K31.82    Aphasia R47.01    Type 2 diabetes with nephropathy (HCC) E11.21    Lower GI bleed K92.2       Home Medications:     (Not in a hospital admission)      Review of Systems:     Constitutional: No fevers, chills, weight loss, fatigue. Skin: No rashes, pruritis, jaundice, ulcerations, erythema. HENT: No headaches, nosebleeds, sinus pressure, rhinorrhea, sore throat. Eyes: No visual changes, blurred vision, eye pain, photophobia, jaundice. Cardiovascular: No chest pain, heart palpitations. Respiratory: No cough, SOB, wheezing, chest discomfort, orthopnea. Gastrointestinal: + Rectal bleeding   Genitourinary: No dysuria, bleeding, discharge, pyuria. Musculoskeletal: No weakness, arthralgias, wasting. Endo: No sweats. Heme: No bruising, easy bleeding. Allergies: As noted. Neurological: Cranial nerves intact. Alert and oriented. Gait not assessed. Psychiatric:  No anxiety, depression, hallucinations. Visit Vitals  BP (!) 92/56   Pulse 71   Temp 98.3 °F (36.8 °C)   Resp 14   SpO2 100%       Physical Assessment:     constitutional: in trendelenburg position, normal habitus, no deformities, in no acute distress. skin: no rashes, ulcers, icterus or other lesions  eyes: normal conjunctivae and lids; no jaundice pupils: normal  HEENT: normocephalic, atraumatic  neck: supple, normal ROM   respiratory: normal chest excursion; no intercostal retraction or accessory muscle use; clear to ascultation bilaterally    cardiovascular: regular rate and rhythm, no murmur, rub or gallop.   abdominal: normal bowel sounds, soft, no tenderness or masses. no hernias appreciated. liver: normal size and consistency. spleen: not palpable.    rectal: hemoccult/guaiac: not performed. extremities: no significant deformity or contracture, no edema. Gait not assessed   neurologic: cranial nerves: grossly intact. psychiatric: judgement/insight: within normal limits. memory: within normal limits for recent and remote events. mood and affect: no evidence of depression, anxiety or agitation. orientation: oriented to time, space and person. Basic Metabolic Profile   Recent Labs     05/05/21  0400      K 4.3      CO2 27   BUN 51*   *   CA 8.4*         CBC w/Diff    Recent Labs     05/05/21  0400   WBC 5.1   RBC 2.90*   HGB 7.2*   HCT 22.5*   MCV 77.6   MCH 24.8   MCHC 32.0   RDW 17.9*       Recent Labs     05/05/21  0400   GRANS 84*   LYMPH 13*   EOS 0        Hepatic Function   Recent Labs     05/05/21  0400   ALB 3.1*   TP 5.6*   TBILI 0.9   AP 60        Coags   No results for input(s): PTP, INR, APTT, INREXT in the last 72 hours. BERTHA Moss.   05/05/21, 8:45 AM   [facility] [place] 1949, [address] [facility]  Cell: [phone]  [email or web address]. [email or web address][employer]dana

## 2021-05-05 NOTE — ROUTINE PROCESS
TRANSFER - OUT REPORT:    Verbal report given to Brigitte Montalvo RN on Jing Bell  being transferred to ICU for routine progression of care       Report consisted of patients Situation, Background, Assessment and   Recommendations(SBAR). Information from the following report(s) SBAR, ED Summary and MAR was reviewed with the receiving nurse. Lines:   Peripheral IV 05/05/21 Right Antecubital (Active)       Peripheral IV 05/05/21 Left Antecubital (Active)   Site Assessment Clean, dry, & intact 05/05/21 0330   Phlebitis Assessment 0 05/05/21 0330   Infiltration Assessment 0 05/05/21 0330   Dressing Status Clean, dry, & intact 05/05/21 0330   Dressing Type Transparent 05/05/21 0330   Hub Color/Line Status Pink;Patent; Flushed 05/05/21 0330   Action Taken Blood drawn 05/05/21 0330        Opportunity for questions and clarification was provided.       Patient transported with:   Monitor  Registered Nurse  Tech

## 2021-05-05 NOTE — ROUTINE PROCESS
Received patient from ED via stretcher on monitor with RN and tech. Patient alert and oriented. No c/o of pain. Patient transferred to bed and connected to station monitor. V/S obtained and patient made comfortable. ED nurse mentioned that blood products are ready as he had left the ED. 11:50 ICU team and Dr Kimberly Sosa made aware of patient present in 1. IR had called re a stat embolization. Confered with Dr Kimberly Sosa to hang a unit of PRBC and take to IR with blood. Joey Simmons NP started a 18 gauge in left lower forarm after 2 sticks IR made aware. Patient voiding via condom cath. Family in waiting room. MD stats hold FFP. Patient to receive [employer]. 1215  1 unit of PRBCs hung after 2 RN verification. [employer] ordered and hung also. Transportation notified for pick to angio. Will observe for transactions  [phone] 29 02 18 G infiltrated. Blood switched to [facility]. Joey Simmons NP starting to look via ultra sound. 1300 Transportation here to assist with transportation to angio. Blood infusing and albumin. Patient transported on monitor with RN to department. Daughter in waiting and was able to see Dad prior to transport destination. 1412 unit notified that patient needs his second unit of blood now; due to low B/P during procedure with sedation . V/S 87/54/65 HR varies with vent pacing  1208 2 nd unit of blood hung with 2 RN verifications. 1530 procedure completed.

## 2021-05-05 NOTE — PROCEDURES
RADIOLOGY POST PROCEDURE NOTE     May 5, 2021       3:00 PM     Preoperative Diagnosis: sigmoid bleed on cta      Postoperative Diagnosis:  Same. Post procedure. :  Dr. Flavio Gaucher    Assistant:  None. Type of Anesthesia: 1% plain lidocaine, 45 min moderate sedation    Procedure/Description:  Angiography coil embolization    Findings:  Same. Estimated blood Loss:  Minimal    Specimen Removed:  None    Blood loss:  Minimal    Implants:  None.     Complications: None    Condition: Stable    Discharge Plan:  discharge back to icu   / continue present therapy    Ifeanyi Real MD

## 2021-05-05 NOTE — ED NOTES
Bedside shift change report given to Gerda Coon (oncoming nurse) by Cyrus DUFFY (offgoing nurse). Report included the following information SBAR, Kardex, ED Summary, Intake/Output, MAR, Accordion and Recent Results.

## 2021-05-05 NOTE — PROGRESS NOTES
[facility]  Pulmonary, Critical Care, and Sleep Medicine      Name: Alexi Postal MRN: [id number]   : 1945 Hospital: [facility]   Date: 2021          Critical Care Initial Patient Consult    Requesting MD:    Dr. Ruiz Jacobson                                                Reason for CC Consult:Hypotension from GIB    IMPRESSION:   · Acute blood loss anemia related to lower GIB- pt reported bloody liquid stools, Hgb of 7  · Acute hypovolemic shock from blood loss- SBP 80s to 90s, pt mentating appropriately  · PETE pre-renal due to volume loss- elevated BUN and creatinine compared to baseline of 0.9  · Chronic anemia- pt is on iron supplementation at home  · Atrial fibrillation-pt takes Coumadin, Digoxin  · Chronic HFrEF- last documented EF 45% on 2015. 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 Josey Lainez from [facility]:   Neuro: no acute needs, avoid sedatives  Pulm: pt on room air, keep O2sat >90. Keep HOB > 30' at all times. Aspiration Precautions. CVS: Transfuse 2 units PRBC and 2 units FFP, pt needs adequate volume resuscitation. Holding all HTN meds. GI: NPO, Consult GI, Dr Lattie Holter aware  Renal:  Trend Renal indices, Monitor I/O, Volume resuscitate  Hem/Onc: Repeat CBC and PT/INR 1 hour post transfusion, serial H/H, CTA to identify bleeding site  I/D: Trend WBCs and temperature curve. Endocrine: Monitor glucose   Metabolic:  Daily BMP, mag, phos. Trend lytes, replace as needed. Musc/Skin: no acute issues, wound care  Prior  Discussed w/ attending physician   This note has been prepared for physician consultation. Subjective/History: This patient has been seen and evaluated at the request of Dr. Akosua Gray for hypotension from GIB.    66 y.o.   male with previous history of GI bleed on coumadin with cc of  rectal bleeding since 11 AM on date of presentation. He reported having had 4-5 episodes bloody liquid stools. He was initially seen at [facility] free standing ER and he was transferred to our facility to secure GI consult. In the ED he became hypotensive and had bloody stools per nursing report. He was fluid responsive after 1 liter of NS. His lab is significant for Hgb drop from 9 to 7 and an INR of 3. He is currently receiving Vit K IV. 2 units of PRBC and 2 units of FFP is ordered for this pt. Past Medical History:   Diagnosis Date    Anemia     Atrial fibrillation (HCC)     BPH (benign prostatic hypertrophy)     CAD (coronary artery disease)     CHF (congestive heart failure) (HCC)     Diabetes ([facility] [address]     Gout     Hyperlipidemia     Hypertension     Pacemaker     Vitamin D deficiency       Past Surgical History:   Procedure Laterality Date    COLONOSCOPY N/A 4/3/2017    COLONOSCOPY performed by Corazon Bailey MD at [facility] END[facility]COPY    HX OTHER SURGICAL      colonoscopy    HX PACEMAKER      defibrillator    HX PACEMAKER PLACEMENT      HX TONSILLECTOMY        Prior to Admission medications    Medication Sig Start Date End Date Taking? Authorizing Provider   digoxin (LANOXIN) 0.25 mg tablet TAKE ONE-HALF TABLET BY  MOUTH ONCE DAILY 2/22/21   Provider, Historical   finasteride (PROSCAR) 5 mg tablet Take 1 Tab by mouth daily. 4/21/21   Richard Le NP   tamsulosin [facility]) 0.4 mg capsule Take 2 Caps by mouth daily (after dinner).  12/8/20   Brianna Hensley MD   furosemide (LASIX) 40 mg tablet furosemide 40 mg tablet    Provider, Historical   atorvastatin (LIPITOR) 40 mg tablet atorvastatin 40 mg tablet 1/3/20   Provider, Historical   warfarin (COUMADIN) 10 mg tablet Take 1 tablet by mouth daily or as directed by [facility] [phone] 3/24/20   Provider, Historical   doxycycline (ADOXA) 100 mg tablet Take 1 Tab by mouth two (2) times a day. 3/4/19   Chandler Walter MD   aspirin delayed-release 81 mg tablet 81 mg.    Provider, Historical   ferrous sulfate 325 mg (65 mg iron) tablet 325 mg. 18   Provider, Historical   multivitamins-minerals-lutein (MEN'S CENTRUM SILVER WITH LUTEIN) tab tablet Take 1 Tab by Mouth Once a Day. Provider, Historical   vit B complex no.12/niacin,B3, (VITAMIN B COMPLEX NO.12-NIACIN PO) Take  by Mouth. Provider, Historical   pantoprazole (PROTONIX) 40 mg tablet Take 1 Tab by mouth two (2) times a day. 10/10/17   India Galloway MD   cholecalciferol (VITAMIN D3) 1,000 unit tablet Take 1,000 Units by mouth daily. Provider, Historical   ascorbic acid, vitamin C, (VITAMIN C) 1,000 mg tablet Take 1,000 mg by mouth. Provider, Historical   carvedilol (COREG) 25 mg tablet Take 25 mg by mouth two (2) times daily (with meals). Provider, Historical   metFORMIN (GLUCOPHAGE) 1,000 mg tablet Take 1,000 mg by mouth two (2) times daily (with meals). Provider, Historical   allopurinol (ZYLOPRIM) 300 mg tablet Take 150 mg by mouth daily. Provider, Historical   spironolactone (ALDACTONE) 25 mg tablet Take  by mouth daily. Provider, Historical   losartan (COZAAR) 100 mg tablet Take 100 mg by mouth daily. Provider, Historical   ezetimibe (ZETIA) 10 mg tablet Take  by mouth. Provider, Historical     Current Facility-Administered Medications   Medication Dose Route Frequency     Allergies   Allergen Reactions    Tradjenta [Linagliptin] Anaphylaxis    Ace Inhibitors Angioedema      Social History     Tobacco Use    Smoking status: Former Smoker     Quit date: 1981     Years since quittin.7    Smokeless tobacco: Never Used   Substance Use Topics    Alcohol use: No      No family history on file.      Review of Systems:  Pertinent items are noted in HPI. Objective:   Vital Signs:    Visit Vitals  BP (!) 92/56   Pulse 71   Temp 98.3 °F (36.8 °C)   Resp 14   SpO2 100%       O2 Device: None (Room air)       Temp (24hrs), Av.3 °F (36.8 °C), Min:98.3 °F (36.8 °C), Max:98.3 °F (36.8 °C)       Intake/Output:   Last shift:      No intake/output data recorded. Last 3 shifts: No intake/output data recorded. No intake or output data in the 24 hours ending 21 0801      Physical Exam:    General:  Alert, cooperative, no distress, appears stated age. Chronic aphasia   Head:  Normocephalic, without obvious abnormality, atraumatic. Eyes:  Conjunctivae pale. PERRL, EOMs intact. Nose: Nares normal. Septum midline. Mucosa normal. No drainage or sinus tenderness. Throat: Lips, mucosa, and tongue normal, mucosa pale   Neck: Supple, symmetrical, trachea midline, no adenopathy, thyroid: no enlargment/tenderness/nodules, no carotid bruit and no JVD. Back:   Symmetric, no curvature. ROM normal.   Lungs:   Clear to auscultation bilaterally. Chest wall:  No tenderness or deformity. Heart:  Regular rate and rhythm, S1, S2 normal, no murmur, click, rub or gallop. Abdomen:   Soft, non-tender. Bowel sounds normal. No masses,  No organomegaly. Extremities: Extremities normal, atraumatic, no cyanosis or edema. Pulses: 2+ and symmetric all extremities. Skin: Skin pale, No rashes or lesions. Slow cap refill.     Lymph nodes: Cervical, supraclavicular, and axillary nodes normal.   Neurologic: Grossly nonfocal       Data:     Recent Results (from the past 24 hour(s))   CBC WITH AUTOMATED DIFF    Collection Time: 21  4:00 AM   Result Value Ref Range    WBC 5.1 4.6 - 13.2 K/uL    RBC 2.90 (L) 4.35 - 5.65 M/uL    HGB 7.2 (L) 13.0 - 16.0 g/dL    HCT 22.5 (L) 36.0 - 48.0 %    MCV 77.6 74.0 - 97.0 FL    MCH 24.8 24.0 - 34.0 PG    MCHC 32.0 31.0 - 37.0 g/dL    RDW 17.9 (H) 11.6 - 14.5 %    PLATELET 834 914 - 147 K/uL NEUTROPHILS 84 (H) 40 - 73 %    LYMPHOCYTES 13 (L) 21 - 52 %    MONOCYTES 3 3 - 10 %    EOSINOPHILS 0 0 - 5 %    BASOPHILS 0 0 - 2 %    ABS. NEUTROPHILS 4.2 1.8 - 8.0 K/UL    ABS. LYMPHOCYTES 0.7 (L) 0.9 - 3.6 K/UL    ABS. MONOCYTES 0.2 0.05 - 1.2 K/UL    ABS. EOSINOPHILS 0.0 0.0 - 0.4 K/UL    ABS. BASOPHILS 0.0 0.0 - 0.1 K/UL    DF AUTOMATED      PLATELET COMMENTS ADEQUATE PLATELETS      RBC COMMENTS HUGO CELLS  1+        RBC COMMENTS SCHISTOCYTES  1+        RBC COMMENTS OVALOCYTES  2+        RBC COMMENTS HYPOCHROMIA  1+       METABOLIC PANEL, COMPREHENSIVE    Collection Time: 05/05/21  4:00 AM   Result Value Ref Range    Sodium 142 136 - 145 mmol/L    Potassium 4.3 3.5 - 5.5 mmol/L    Chloride 107 100 - 111 mmol/L    CO2 27 21 - 32 mmol/L    Anion gap 8 3.0 - 18 mmol/L    Glucose 116 (H) 74 - 99 mg/dL    BUN 51 (H) 7.0 - 18 MG/DL    Creatinine 1.82 (H) 0.6 - 1.3 MG/DL    BUN/Creatinine ratio 28 (H) 12 - 20      GFR est AA 44 (L) >60 ml/min/1.73m2    GFR est non-AA 36 (L) >60 ml/min/1.73m2    Calcium 8.4 (L) 8.5 - 10.1 MG/DL    Bilirubin, total 0.9 0.2 - 1.0 MG/DL    ALT (SGPT) 25 16 - 61 U/L    AST (SGOT) 25 10 - 38 U/L    Alk. phosphatase 60 45 - 117 U/L    Protein, total 5.6 (L) 6.4 - 8.2 g/dL    Albumin 3.1 (L) 3.4 - 5.0 g/dL    Globulin 2.5 2.0 - 4.0 g/dL    A-G Ratio 1.2 0.8 - 1.7               Telemetry:normal sinus rhythm    Imaging:  I have personally reviewed the patients radiographs and have reviewed the reports:  CXR Results  (Last 48 hours)    None        CT Results  (Last 48 hours)               05/05/21 0938  CTA ABD PELV W WO CONT Final result    Impression:      1. Foci of contrast extravasation from anterior wall of the distal descending   colon, most likely the cause of the active GI bleeding as outlined above. 2. Advanced atherosclerotic aortic and vascular disease. 3. Massive cardiomegaly, massive pericardial effusion and minimal bibasilar   pleural effusion.        4. Mesenteric edema and anasarca. A wet read was provided to the ordering physician , Dr. Gagandeep Kaplan, by me upon the   interpretation at approximately  1045  hours. Thank you for your referral.        Narrative:  CT without contrast and CT angiogram of the abdomen and pelvis       HISTORY: Lower GI bleeding       COMPARISON: None. TECHNIQUE: Multi detector helical axial scan through the abdomen and pelvis is   obtained in 2.5 mm thin section before and after dynamic nonionic IV contrast   administration per [facility] protocol. Parenchymal organ imaging is limited by   arterial phase of contrast administration. Contrast: Patient has borderline impaired renal function with 1.8 serum   creatinine level. 100 Visipaque 300 was administered intravenously. Good IV   hydration after scan is advised to ER physician. 3D postprocessed images, including surface shaded display, will produce for this   exam, permanently archived, and interpreted. Parenchymal organ imaging will be   limited by arterial phase contrast administration. All CT scans at this facility performed using dose optimization techniques as   appreciated to a performed exam, to include automated exposure control,   adjustment of the mA and or KU according to patient size (including appropriate   matching for site specific examination), or use of iterative reconstruction   technique. FINDINGS:       CT ANGIOGRAM: There is advanced atherosclerotic vascular disease identified   involving the aorta and abdominal vasculature. AORTA: Mild tortuosity distally. No aneurysm or dissection. [place] ILIAC ARTERIES: Very tortuous but patent without aneurysm. CELIAC ARTERY:Patent. SMA: Patent. RENAL ARTERIES: Patent. Solitary bilateral arteries. KAROLINA: Patent.    GI TRACK: Within the lumen of very distal descending colon, there are   linear/serpiginous hyperdensities identified on early arterial phase, likely   from anterior colonic wall into the lumen on axial # and sagittal   #119/602. This was not seen on precontrast scan. The finding is highly concerned   for contrast extravasation from active bleeding. On the portal venous phase, the   hyperdensity becomes patchy and increased in volume on axial #174/3. No   additional contrast extravasation identified. CT ABDOMEN AND PELVIS:       LUNG BASES: Small left pleural effusion and minimal right pleural effusion. Massive cardiomegaly with massive pericardial effusion. .       ABDOMINAL/PELVIC VISCERA:  The liver, spleen, pancreas, and adrenal glands   appear unremarkable. Small gallbladder sludge is present. No biliary dilatation. The bilateral kidneys appear atrophic with symmetric perfusion. Several cortical   cysts present bilaterally, 2.9 x 3.7 cm at the lateral midpole of right kidney   and 1 cm at anterior midpole of left kidney. The stomach is poorly distended. The small and large bowel are nondilated. Normal appendix. No significant diverticular disease. The bladder Is suboptimally distended with prominent bladder wall, likely due to   under distention. The prostate is nonenlarged. No free air or free fluid. Moderate mesenteric edema and edema in abdominal and pelvic wall. PERITONEUM/RETROPERITONEUM: No significant adenopathy. CT OSSEOUS STRUCTURES: Spondylosis.                        Total critical care time exclusive of procedures: 40 minutes  Joceline Mcmanus NP  05/05/21  Pulmonary, Critical Care Medicine  [facility]

## 2021-05-06 LAB
ANION GAP SERPL CALC-SCNC: 8 MMOL/L (ref 3–18)
ATRIAL RATE: 72 BPM
BASOPHILS # BLD: 0 K/UL (ref 0–0.1)
BASOPHILS NFR BLD: 0 % (ref 0–2)
BUN SERPL-MCNC: 83 MG/DL (ref 7–18)
BUN/CREAT SERPL: 44 (ref 12–20)
CA-I SERPL-SCNC: 1.15 MMOL/L (ref 1.12–1.32)
CALCIUM SERPL-MCNC: 7.8 MG/DL (ref 8.5–10.1)
CALCULATED P AXIS, ECG09: 51 DEGREES
CALCULATED R AXIS, ECG10: 163 DEGREES
CALCULATED T AXIS, ECG11: 19 DEGREES
CHLORIDE SERPL-SCNC: 114 MMOL/L (ref 100–111)
CO2 SERPL-SCNC: 24 MMOL/L (ref 21–32)
CREAT SERPL-MCNC: 1.87 MG/DL (ref 0.6–1.3)
DIAGNOSIS, 93000: NORMAL
DIFFERENTIAL METHOD BLD: ABNORMAL
DIGOXIN SERPL-MCNC: 0.5 NG/ML (ref 0.9–2)
EOSINOPHIL # BLD: 0.2 K/UL (ref 0–0.4)
EOSINOPHIL NFR BLD: 3 % (ref 0–5)
ERYTHROCYTE [DISTWIDTH] IN BLOOD BY AUTOMATED COUNT: 18.1 % (ref 11.6–14.5)
GLUCOSE BLD STRIP.AUTO-MCNC: 111 MG/DL (ref 70–110)
GLUCOSE BLD STRIP.AUTO-MCNC: 117 MG/DL (ref 70–110)
GLUCOSE BLD STRIP.AUTO-MCNC: 152 MG/DL (ref 70–110)
GLUCOSE BLD STRIP.AUTO-MCNC: 162 MG/DL (ref 70–110)
GLUCOSE BLD STRIP.AUTO-MCNC: 181 MG/DL (ref 70–110)
GLUCOSE SERPL-MCNC: 105 MG/DL (ref 74–99)
HCT VFR BLD AUTO: 22.1 % (ref 36–48)
HCT VFR BLD AUTO: 22.6 % (ref 36–48)
HCT VFR BLD AUTO: 23.4 % (ref 36–48)
HGB BLD-MCNC: 7.3 G/DL (ref 13–16)
HGB BLD-MCNC: 7.3 G/DL (ref 13–16)
HGB BLD-MCNC: 7.5 G/DL (ref 13–16)
INR PPP: 1.3 (ref 0.8–1.2)
LYMPHOCYTES # BLD: 1.5 K/UL (ref 0.9–3.6)
LYMPHOCYTES NFR BLD: 16 % (ref 21–52)
MAGNESIUM SERPL-MCNC: 1.8 MG/DL (ref 1.6–2.6)
MCH RBC QN AUTO: 25.6 PG (ref 24–34)
MCHC RBC AUTO-ENTMCNC: 32.3 G/DL (ref 31–37)
MCV RBC AUTO: 79.3 FL (ref 74–97)
MONOCYTES # BLD: 1.1 K/UL (ref 0.05–1.2)
MONOCYTES NFR BLD: 11 % (ref 3–10)
NEUTS SEG # BLD: 6.7 K/UL (ref 1.8–8)
NEUTS SEG NFR BLD: 70 % (ref 40–73)
P-R INTERVAL, ECG05: 342 MS
PHOSPHATE SERPL-MCNC: 5.1 MG/DL (ref 2.5–4.9)
PLATELET # BLD AUTO: 136 K/UL (ref 135–420)
POTASSIUM SERPL-SCNC: 3.8 MMOL/L (ref 3.5–5.5)
PROTHROMBIN TIME: 16 SEC (ref 11.5–15.2)
Q-T INTERVAL, ECG07: 460 MS
QRS DURATION, ECG06: 166 MS
QTC CALCULATION (BEZET), ECG08: 503 MS
RBC # BLD AUTO: 2.85 M/UL (ref 4.35–5.65)
SODIUM SERPL-SCNC: 146 MMOL/L (ref 136–145)
VENTRICULAR RATE, ECG03: 72 BPM
WBC # BLD AUTO: 9.6 K/UL (ref 4.6–13.2)

## 2021-05-06 PROCEDURE — 65660000000 HC RM CCU STEPDOWN

## 2021-05-06 PROCEDURE — 77030037141 HC CATH MIC PX SLIM PENU -F

## 2021-05-06 PROCEDURE — 2709999900 HC NON-CHARGEABLE SUPPLY

## 2021-05-06 PROCEDURE — 85610 PROTHROMBIN TIME: CPT

## 2021-05-06 PROCEDURE — 99233 SBSQ HOSP IP/OBS HIGH 50: CPT | Performed by: INTERNAL MEDICINE

## 2021-05-06 PROCEDURE — 74011250636 HC RX REV CODE- 250/636: Performed by: PHYSICIAN ASSISTANT

## 2021-05-06 PROCEDURE — 84100 ASSAY OF PHOSPHORUS: CPT

## 2021-05-06 PROCEDURE — APPSS60 APP SPLIT SHARED TIME 46-60 MINUTES: Performed by: REGISTERED NURSE

## 2021-05-06 PROCEDURE — 85018 HEMOGLOBIN: CPT

## 2021-05-06 PROCEDURE — 74011636637 HC RX REV CODE- 636/637: Performed by: INTERNAL MEDICINE

## 2021-05-06 PROCEDURE — 74011250637 HC RX REV CODE- 250/637: Performed by: INTERNAL MEDICINE

## 2021-05-06 PROCEDURE — 74011250636 HC RX REV CODE- 250/636: Performed by: INTERNAL MEDICINE

## 2021-05-06 PROCEDURE — C1894 INTRO/SHEATH, NON-LASER: HCPCS

## 2021-05-06 PROCEDURE — C1760 CLOSURE DEV, VASC: HCPCS

## 2021-05-06 PROCEDURE — 82962 GLUCOSE BLOOD TEST: CPT

## 2021-05-06 PROCEDURE — 74011250637 HC RX REV CODE- 250/637: Performed by: HOSPITALIST

## 2021-05-06 PROCEDURE — 83735 ASSAY OF MAGNESIUM: CPT

## 2021-05-06 PROCEDURE — 82330 ASSAY OF CALCIUM: CPT

## 2021-05-06 PROCEDURE — 93005 ELECTROCARDIOGRAM TRACING: CPT

## 2021-05-06 PROCEDURE — 99233 SBSQ HOSP IP/OBS HIGH 50: CPT | Performed by: HOSPITALIST

## 2021-05-06 PROCEDURE — 85025 COMPLETE CBC W/AUTO DIFF WBC: CPT

## 2021-05-06 PROCEDURE — 80048 BASIC METABOLIC PNL TOTAL CA: CPT

## 2021-05-06 PROCEDURE — 74011000250 HC RX REV CODE- 250: Performed by: INTERNAL MEDICINE

## 2021-05-06 PROCEDURE — 80162 ASSAY OF DIGOXIN TOTAL: CPT

## 2021-05-06 PROCEDURE — C1769 GUIDE WIRE: HCPCS

## 2021-05-06 PROCEDURE — 36415 COLL VENOUS BLD VENIPUNCTURE: CPT

## 2021-05-06 PROCEDURE — C9113 INJ PANTOPRAZOLE SODIUM, VIA: HCPCS | Performed by: INTERNAL MEDICINE

## 2021-05-06 RX ORDER — DIGOXIN 125 MCG
0.25 TABLET ORAL DAILY
Status: DISCONTINUED | OUTPATIENT
Start: 2021-05-06 | End: 2021-05-07

## 2021-05-06 RX ORDER — MAGNESIUM SULFATE HEPTAHYDRATE 40 MG/ML
2 INJECTION, SOLUTION INTRAVENOUS ONCE
Status: COMPLETED | OUTPATIENT
Start: 2021-05-06 | End: 2021-05-06

## 2021-05-06 RX ORDER — ALLOPURINOL 300 MG/1
150 TABLET ORAL DAILY
Status: DISCONTINUED | OUTPATIENT
Start: 2021-05-07 | End: 2021-05-08 | Stop reason: HOSPADM

## 2021-05-06 RX ORDER — ATORVASTATIN CALCIUM 40 MG/1
40 TABLET, FILM COATED ORAL
Status: DISCONTINUED | OUTPATIENT
Start: 2021-05-06 | End: 2021-05-08 | Stop reason: HOSPADM

## 2021-05-06 RX ORDER — LANOLIN ALCOHOL/MO/W.PET/CERES
1 CREAM (GRAM) TOPICAL
Status: DISCONTINUED | OUTPATIENT
Start: 2021-05-07 | End: 2021-05-08 | Stop reason: HOSPADM

## 2021-05-06 RX ORDER — PANTOPRAZOLE SODIUM 40 MG/1
40 TABLET, DELAYED RELEASE ORAL 2 TIMES DAILY
Status: DISCONTINUED | OUTPATIENT
Start: 2021-05-06 | End: 2021-05-08 | Stop reason: HOSPADM

## 2021-05-06 RX ORDER — FINASTERIDE 5 MG/1
5 TABLET, FILM COATED ORAL DAILY
Status: DISCONTINUED | OUTPATIENT
Start: 2021-05-07 | End: 2021-05-08 | Stop reason: HOSPADM

## 2021-05-06 RX ORDER — EZETIMIBE 10 MG/1
10 TABLET ORAL DAILY
Status: DISCONTINUED | OUTPATIENT
Start: 2021-05-07 | End: 2021-05-08 | Stop reason: HOSPADM

## 2021-05-06 RX ORDER — TAMSULOSIN HYDROCHLORIDE 0.4 MG/1
0.8 CAPSULE ORAL
Status: DISCONTINUED | OUTPATIENT
Start: 2021-05-06 | End: 2021-05-08 | Stop reason: HOSPADM

## 2021-05-06 RX ADMIN — Medication 10 ML: at 21:58

## 2021-05-06 RX ADMIN — SODIUM CHLORIDE 40 MG: 9 INJECTION, SOLUTION INTRAMUSCULAR; INTRAVENOUS; SUBCUTANEOUS at 09:15

## 2021-05-06 RX ADMIN — Medication 10 ML: at 17:40

## 2021-05-06 RX ADMIN — Medication 10 ML: at 06:02

## 2021-05-06 RX ADMIN — DIGOXIN 0.25 MG: 125 TABLET ORAL at 18:58

## 2021-05-06 RX ADMIN — PANTOPRAZOLE 40 MG: 40 TABLET, DELAYED RELEASE ORAL at 18:58

## 2021-05-06 RX ADMIN — MAGNESIUM SULFATE HEPTAHYDRATE 2 G: 40 INJECTION, SOLUTION INTRAVENOUS at 06:07

## 2021-05-06 RX ADMIN — TAMSULOSIN HYDROCHLORIDE 0.8 MG: 0.4 CAPSULE ORAL at 18:57

## 2021-05-06 RX ADMIN — INSULIN LISPRO 2 UNITS: 100 INJECTION, SOLUTION INTRAVENOUS; SUBCUTANEOUS at 23:48

## 2021-05-06 RX ADMIN — Medication 10 ML: at 06:03

## 2021-05-06 RX ADMIN — ATORVASTATIN CALCIUM 40 MG: 40 TABLET, FILM COATED ORAL at 21:58

## 2021-05-06 NOTE — PROGRESS NOTES
attended the interdisciplinary rounds for Gabriela Mccabe, who is a 66 y. o.,male. Patients Primary Language is: [place]. According to the patients EMR Christianity Affiliation is: No Islam. The reason the Patient came to the hospital is:   Patient Active Problem List    Diagnosis Date Noted    Acute GI bleeding 05/05/2021    Hypovolemia 05/05/2021    Acute blood loss anemia 05/05/2021    Lower GI bleed 05/05/2021    CAD (coronary artery disease)     Thoracic aortic aneurysm without rupture (HCC)     Type 2 diabetes with nephropathy ([facility] [address] 04/24/2018    Aphasia 10/20/2017    Dieulafoy lesion (hemorrhagic) of stomach and duodenum (CODE) 10/08/2017    Acute gastric ulcer with hemorrhage 10/05/2017    GI bleed 10/04/2017    Symptomatic anemia 10/04/2017    Rectal bleed 10/04/2017    Hypertension     Diabetes ([facility] [address]     Hyperlipidemia     Gout     AICD (automatic cardioverter/defibrillator) present     Vitamin D deficiency     CHF (congestive heart failure) (HCC)     BPH (benign prostatic hypertrophy)     Anemia     Atrial fibrillation ([facility] [address]         Plan:  Chaplains will continue to follow and will provide pastoral care on an as needed/requested basis.  recommends bedside caregivers page  on duty if patient shows signs of acute spiritual or emotional distress.     [address] [facility]  Board Certified [address] [facility]   [phone]

## 2021-05-06 NOTE — PROGRESS NOTES
Problem: Falls - Risk of  Goal: *Absence of Falls  Description: Document [place] Fall Risk and appropriate interventions in the flowsheet.   Outcome: Progressing Towards Goal  Note: Fall Risk Interventions:                 Elimination Interventions: Bed/chair exit alarm, Call light in reach, Patient to call for help with toileting needs              Problem: Patient Education: Go to Patient Education Activity  Goal: Patient/Family Education  Outcome: Progressing Towards Goal     Problem: Patient Education: Go to Patient Education Activity  Goal: Patient/Family Education  Outcome: Progressing Towards Goal     Problem: Upper and Lower GI Bleed: Day 1  Goal: Off Pathway (Use only if patient is Off Pathway)  Outcome: Progressing Towards Goal  Goal: Activity/Safety  Outcome: Progressing Towards Goal  Goal: Consults, if ordered  Outcome: Progressing Towards Goal  Goal: Diagnostic Test/Procedures  Outcome: Progressing Towards Goal  Goal: Nutrition/Diet  Outcome: Progressing Towards Goal  Goal: Discharge Planning  Outcome: Progressing Towards Goal  Goal: Medications  Outcome: Progressing Towards Goal  Goal: Respiratory  Outcome: Progressing Towards Goal  Goal: Treatments/Interventions/Procedures  Outcome: Progressing Towards Goal  Goal: Psychosocial  Outcome: Progressing Towards Goal  Goal: *Optimal pain control at patient's stated goal  Outcome: Progressing Towards Goal  Goal: *Hemodynamically stable  Outcome: Progressing Towards Goal  Goal: *Demonstrates progressive activity  Outcome: Progressing Towards Goal     Problem: Upper and Lower GI Bleed: Day 2  Goal: Off Pathway (Use only if patient is Off Pathway)  Outcome: Progressing Towards Goal  Goal: Activity/Safety  Outcome: Progressing Towards Goal  Goal: Consults, if ordered  Outcome: Progressing Towards Goal  Goal: Diagnostic Test/Procedures  Outcome: Progressing Towards Goal  Goal: Nutrition/Diet  Outcome: Progressing Towards Goal  Goal: Discharge Planning  Outcome: Progressing Towards Goal  Goal: Medications  Outcome: Progressing Towards Goal  Goal: Respiratory  Outcome: Progressing Towards Goal  Goal: Treatments/Interventions/Procedures  Outcome: Progressing Towards Goal  Goal: Psychosocial  Outcome: Progressing Towards Goal  Goal: *Optimal pain control at patient's stated goal  Outcome: Progressing Towards Goal  Goal: *Hemodynamically stable  Outcome: Progressing Towards Goal  Goal: *Tolerating diet  Outcome: Progressing Towards Goal  Goal: *Demonstrates progressive activity  Outcome: Progressing Towards Goal     Problem: Upper and Lower GI Bleed: Day 3  Goal: Off Pathway (Use only if patient is Off Pathway)  Outcome: Progressing Towards Goal  Goal: Activity/Safety  Outcome: Progressing Towards Goal  Goal: Diagnostic Test/Procedures  Outcome: Progressing Towards Goal  Goal: Nutrition/Diet  Outcome: Progressing Towards Goal  Goal: Discharge Planning  Outcome: Progressing Towards Goal  Goal: Medications  Outcome: Progressing Towards Goal  Goal: Treatments/Interventions/Procedures  Outcome: Progressing Towards Goal  Goal: Psychosocial  Outcome: Progressing Towards Goal     Problem: Upper and Lower GI Bleed:  Discharge Outcomes  Goal: *Hemodynamically stable  Outcome: Progressing Towards Goal  Goal: *Lungs clear or at baseline  Outcome: Progressing Towards Goal  Goal: *Demonstrates independent activity or return to baseline  Outcome: Progressing Towards Goal  Goal: *Pain is controlled to three or less  Outcome: Progressing Towards Goal  Goal: *Verbalizes understanding and describes prescribed diet  Outcome: Progressing Towards Goal  Goal: *Tolerating diet  Outcome: Progressing Towards Goal  Goal: *Verbalizes name, dosage, time, side effects, and number of days to continue medications  Outcome: Progressing Towards Goal  Goal: *Anxiety reduced or absent  Outcome: Progressing Towards Goal  Goal: *Understands and describes signs and symptoms to report to providers(Stroke Metric)  Outcome: Progressing Towards Goal  Goal: *Describes follow-up/return visits to physicians  Outcome: Progressing Towards Goal  Goal: *Describes available resources and support systems  Outcome: Progressing Towards Goal     Problem: Fluid Volume - Risk of, Imbalanced  Goal: *Balanced intake and output  Outcome: Progressing Towards Goal     Problem: Patient Education: Go to Patient Education Activity  Goal: Patient/Family Education  Outcome: Progressing Towards Goal     Problem: Pain  Goal: *Control of Pain  Outcome: Progressing Towards Goal  Goal: *PALLIATIVE CARE:  Alleviation of Pain  Outcome: Progressing Towards Goal     Problem: Patient Education: Go to Patient Education Activity  Goal: Patient/Family Education  Outcome: Progressing Towards Goal

## 2021-05-06 NOTE — PROGRESS NOTES
TRANSFER - OUT REPORT:    Verbal report given to RN (name) on Remy Rivera  being transferred to [address] [place] [address](unit) for routine progression of care       Report consisted of patients Situation, Background, Assessment and   Recommendations(SBAR). Information from the following report(s) SBAR, Kardex, Procedure Summary, Intake/Output, Recent Results, Med Rec Status, Cardiac Rhythm NSR and Alarm Parameters  was reviewed with the receiving nurse. Lines:   Peripheral IV 05/05/21 Right Antecubital (Active)   Site Assessment Clean, dry, & intact 05/06/21 0600   Phlebitis Assessment 0 05/06/21 0600   Infiltration Assessment 0 05/06/21 0600   Dressing Status Clean, dry, & intact 05/06/21 0600   Dressing Type Tape;Transparent 05/06/21 0600   Hub Color/Line Status Pink;Capped;Flushed 05/06/21 0600   Action Taken Open ports on tubing capped 05/06/21 0600   Alcohol Cap Used Yes 05/06/21 0600       Peripheral IV 05/05/21 Left Antecubital (Active)   Site Assessment Clean, dry, & intact 05/06/21 0600   Phlebitis Assessment 0 05/06/21 0600   Infiltration Assessment 0 05/06/21 0600   Dressing Status Clean, dry, & intact 05/06/21 0600   Dressing Type Tape;Transparent 05/06/21 0600   Hub Color/Line Status Pink;Capped;Flushed 05/06/21 0600   Action Taken Open ports on tubing capped 05/06/21 0600   Alcohol Cap Used Yes 05/06/21 0600       Peripheral IV 05/05/21 Anterior;Proximal;Right Forearm (Active)   Site Assessment Clean, dry, & intact 05/06/21 0600   Phlebitis Assessment 0 05/06/21 0600   Infiltration Assessment 0 05/06/21 0600   Dressing Status Clean, dry, & intact 05/06/21 0600   Dressing Type Tape;Transparent 05/06/21 0600   Hub Color/Line Status Green;Capped;Flushed 05/06/21 0600   Action Taken Open ports on tubing capped 05/06/21 0600   Alcohol Cap Used Yes 05/06/21 0600        Opportunity for questions and clarification was provided.       Patient transported with:   Tech   Pt belongings

## 2021-05-06 NOTE — PROGRESS NOTES
OT orders received and medical chart review completed. Per nursing, in process of assisting pt being transferred to another unit. OT to follow up tomorrow.

## 2021-05-06 NOTE — PROGRESS NOTES
Interventional Radiology Progress Note    Patient: Jing Bell               Sex: male          DOA: 5/4/2021    YOB: 1945      Age:  66 y.o.        LOS:  LOS: 1 day          Subjective: The patient reports that he feels well this morning. He denies any abdominal pain, nausea or vomiting. The patient denies pain at his right groin access site. He denies weakness or dizziness and states that he is hungry and ready to eat. Objective:      Visit Vitals  /60   Pulse 73   Temp 97.7 °F (36.5 °C)   Resp 15   Ht 5' 8\" (1.727 m)   Wt 85.4 kg (188 lb 4.4 oz)   SpO2 100%   BMI 28.63 kg/m²     Physical Exam:  Constitutional: NAD. A&Ox4. Respiratory: Normal respiratory effort. Symmetrical rise and fall of chest.    Cardiovascular: Regular rate. Gastrointestinal: Soft, NT, ND. Right groin access site: No strikethrough on the dressing, nontender to palpation    Intake and Output:  Current Shift:  No intake/output data recorded. Last three shifts:  05/04 1901 - 05/06 0700  In: 2143.3 [I.V.:1240]  [facility] [Urine:1675]    Lab/Data Reviewed: All lab results for the last 24 hours reviewed. Assessment/Plan     Active Problems:    CHF (congestive heart failure) (HCC) ()      Type 2 diabetes with nephropathy ([facility] [address] (4/24/2018)      Acute GI bleeding (5/5/2021)      Hypovolemia (5/5/2021)      Acute blood loss anemia (5/5/2021)      CAD (coronary artery disease) ()      Thoracic aortic aneurysm without rupture (HCC) ()    Patient is POD#1 s/p selective sigmoid coil embolization by Dr. Joselo Murphy. From an IR standpoint, patient doing well without any apparent complications.      Thank you,  BERTHA Vlales

## 2021-05-06 NOTE — PROGRESS NOTES
2000 Assumed care of pt after bedside report with pt lying supine in bed with pt in reverse trendelenberg so he can watch TV while lying straight in bed and no head elevation until 2130 due to rt groin catheter entry for coiling procedure today. AAO x 4. [place] x 4 when not in position for now. Monitor denotes pacing. Pt on RA. Lungs clear diminished in bases. Abd soft, intact. Pt NPO. Pt voiding in urinal a needed. Pt had small maroon to dark brown liquid stool in bedpan. Pt cleansed. Denies c/o pain or discomfort. Rt groin cath site with pressure dressing dry and intact, no no drainage, no bleeding and no hematoma noted. Pulses present and palpable. 2105 Labs drawn and sent to lab. 2200 Pt status unchanged. 2330 Pt given Calcium gluconate 1 g IVPB replacement as ordered. Inda Brittle 05/06/2021 0000 Accucheck result 111. No Lispro insulin given as per sliding scale coverage. 0300 AM labs drawn and sent to lab. 0400 Condom catheter replaced due to pt accidentally pulled it off. Tolerated without difficulty. Pt had medium black liquid stool in bedpan. Pt cleansed. Bed pads changed. 0600 Accucheck result  117. No Lispro insulin given as per sliding scale coverage.

## 2021-05-06 NOTE — PROGRESS NOTES
Bedside shift change report given to CLIVE Fernandez (oncoming nurse) by Maury Blood RN (offgoing nurse). Report included the following information SBAR, Kardex, Intake/Output, MAR and Recent Results.

## 2021-05-06 NOTE — PROGRESS NOTES
[email or web address]  [phone]    Gastroenterology follow up-Progress note    Impression:  1. GI bleed - large volume hematochezia, began 11 am yesterday. - Prior history GIB in 2017 - melenea and hematochezia, gastritis and ulcer  - s/p IR embolization of sigmoid bleed 5/5  2. A fib - on Coumadin, INR 3 at outside hospital  S/p Vit K and FFP  3. Anemia - Acute on chronic, hx HUMERA  - Hgb 9 at outside ER, now 7.2  S/p PRBC yesterday, stable at 7.3  4. CHF s/p AICD  5. DM2     Prior scopes:  [place] 2017 - severe diverticulosis, 5 year repeat recommended, personal hx polyps  EGD 10/2017 - clean based ulcer    CTA 5/5 IMPRESSION     1. Foci of contrast extravasation from anterior wall of the distal descending  colon, most likely the cause of the active GI bleeding as outlined above.   2. Advanced atherosclerotic aortic and vascular disease.   3. Massive cardiomegaly, massive pericardial effusion and minimal bibasilar  pleural effusion.   4. Mesenteric edema and anasarca. Plan:  1. Monitor H/H closely, transfuse for Hgb <7  2. Hold anticoagulant  3. Continue PPI  4. Diet per primary team  5. Medical management per primary team    Chief Complaint: GI bleed      Subjective:  Seen this morning w/ 2 daughters at bedside. Feeling well without complaints. Still having dark red blood in stool per RN    ROS: Denies any fevers, chills, rash.        General: well developed, well nourished, no acute distress  Eyes: conjunctiva normal, EOM normal  Cardiovascular: heart normal, intact distal pulses, normal rate and regular rhythm  Pulmonary: breath sounds normal and effort normal  Abdominal: appearance normal, bowel sounds normal and soft, non-acute, non-tender    Patient Active Problem List   Diagnosis Code    Hypertension I10    Diabetes ([facility] [address] E11.9    Hyperlipidemia E78.5    Gout M10.9    AICD (automatic cardioverter/defibrillator) present Z95.810    Vitamin D deficiency E55.9    CHF (congestive heart failure) ([facility] [address] I50.9    BPH (benign prostatic hypertrophy) N40.0    Anemia D64.9    Atrial fibrillation (HCC) I48.91    GI bleed K92.2    Symptomatic anemia D64.9    Rectal bleed K62.5    Acute gastric ulcer with hemorrhage K25.0    Dieulafoy lesion (hemorrhagic) of stomach and duodenum (CODE) K31.82    Aphasia R47.01    Type 2 diabetes with nephropathy (HCC) E11.21    Acute GI bleeding K92.2    Hypovolemia E86.1    Acute blood loss anemia D62    CAD (coronary artery disease) I25.10    Thoracic aortic aneurysm without rupture (HCC) I71.2    Lower GI bleed K92.2         Visit Vitals  /60   Pulse 73   Temp 97.7 °F (36.5 °C)   Resp 15   Ht 5' 8\" (1.727 m)   Wt 85.4 kg (188 lb 4.4 oz)   SpO2 100%   BMI 28.63 kg/m²           Intake/Output Summary (Last 24 hours) at 5/6/2021 1639  Last data filed at 5/6/2021 0400  Gross per 24 hour   Intake 680 ml   Output 850 ml   Net -170 ml       CBC w/Diff    Lab Results   Component Value Date/Time    WBC 9.6 05/06/2021 03:00 AM    RBC 2.85 (L) 05/06/2021 03:00 AM    HGB 7.3 (L) 05/06/2021 08:46 AM    HCT 22.1 (L) 05/06/2021 08:46 AM    MCV 79.3 05/06/2021 03:00 AM    MCH 25.6 05/06/2021 03:00 AM    MCHC 32.3 05/06/2021 03:00 AM    RDW 18.1 (H) 05/06/2021 03:00 AM     05/06/2021 03:00 AM    Lab Results   Component Value Date/Time    GRANS 70 05/06/2021 03:00 AM    LYMPH 16 (L) 05/06/2021 03:00 AM    EOS 3 05/06/2021 03:00 AM    BASOS 0 05/06/2021 03:00 AM      Basic Metabolic Profile   Recent Labs     05/06/21  0300   *   K 3.8   *   CO2 24   BUN 83*   CA 7.8*   MG 1.8   PHOS 5.1*        Hepatic Function    Lab Results   Component Value Date/Time    ALB 3.1 (L) 05/05/2021 04:00 AM    TP 5.6 (L) 05/05/2021 04:00 AM    AP 60 05/05/2021 04:00 AM    No results found for: TBIL       Coags   Recent Labs     05/06/21  0300 05/05/21  1839   PTP 16.0* 17.0*   INR 1.3* 1.4*               Paula Salcedo, [address]  05/06/21, 4:43 PM   [facility]. [email or web address]  Phone: [phone]  Pager: [phone]

## 2021-05-06 NOTE — PROGRESS NOTES
[facility]  Progress Note    Patient: Baudilio Morgan Age: 66 y.o. : 1945 MR#: [id number] SSN: [id number]  Date/Time: 2021     Subjective: Patient feels fine, denies any complaints. At 2 balance today both were loose and black. No bloody stool. 66 y. o. male with previous history of GI bleed on coumadin with cc of  rectal bleeding since 11 AM on date of presentation. Linsey Lord reported having had 4-5 episodes bloody liquid stools. He was initially seen at [facility] free standing ER and he was transferred to our facility to secure GI consult. In the ED he became hypotensive and had bloody stools per nursing report. He was fluid responsive after 1 liter of NS.  His lab is significant for Hgb drop from 9 to 7 and an INR of 3. He also received Vit K IV. CT abdomen and pelvis revealed extravasation of contrast from anterior wall of the distal descending colon which prompted him being brought to IR for coil embolization of his sigmoid bleed. He was transferred to the ICU post procedure where he received 2 units of PRBC and K-centra. His Hgb went up appropriately post transfusion and remained hemodynamically stable without vasopressors. Patient hemoglobin stable and blood pressure stable. Patient has been downgraded to telemetry bed today. Assessment/Plan:   1. Acute blood loss anemia related to sigmoid bleed S/P IR performed embolization for diverticular sigmoid bleed (21)  2. Acute hypovolemic shock from blood loss, better   3. CKD stage 3  4. Atrial fibrillation on Coumadin and Digoxin  5. Chronic systolic heart failure EF 45%  6. CAD  7. DM type II  8. HTN  9. HLD  10. Hx of CVA embolic  11. History of chronic gastritis with hemorrhage  12. Hx of thoracic aortic aneurysm without rupture- followed by Dr. Maria Esther Duarte from [facility]  13. DNR    Plan  1. We will continue to monitor H&H closely and transfuse as needed.   2. We will continue to hold aspirin and Coumadin. Monitor INR. Resume Coumadin when cleared by GI. 3. GI on board, if any signs of further bleeding plan for upper GI endoscopy. 4. We will continue PPI. 5. Blood pressure stabilized now, off IV fluids. We will monitor off hypertensive medication. 6. We will start digoxin, dig level low. Will resume beta-blocker once blood pressure more stable. 7. Continue SSI and monitor blood sugars. 8. Will resume Lipitor, Proscar and Flomax. 9. Will monitor creatinine, continue to hold Lasix. Will resume Lasix if any signs of fluid overload. Discussed with the patient and also with daughter Griselda Collin 010-2000 and explained the detail about my above plan of care. Both understood and agreed with my plan. I spent 40 minutes with the patient in face-to-face consultation, of which greater than 50% was spent in counseling and coordination of care as described above. Case discussed with:  [x]Patient  [x]Family  [x]Nursing  []Case Management  DVT Prophylaxis:  []Lovenox  []Hep SQ  [x]SCDs  []Coumadin   []Eliquis/Xarelto     Objective:   VS:   Visit Vitals  /60   Pulse 73   Temp 97.7 °F (36.5 °C)   Resp 15   Ht 5' 8\" (1.727 m)   Wt 85.4 kg (188 lb 4.4 oz)   SpO2 100%   BMI 28.63 kg/m²      Tmax/24hrs: Temp (24hrs), Av.3 °F (36.8 °C), Min:97.7 °F (36.5 °C), Max:98.8 °F (37.1 °C)  IOBRIEF    Intake/Output Summary (Last 24 hours) at 2021 1616  Last data filed at 2021 0400  Gross per 24 hour   Intake 680 ml   Output 850 ml   Net -170 ml       General:  Alert, cooperative, no acute distress    HEENT: PERRLA, anicteric sclerae. Pulmonary:  CTA Bilaterally. No Wheezing/Rales. Cardiovascular: Regular rate and Rhythm. GI:  Soft, Non distended, Non tender. + Bowel sounds. Extremities: Right groin dressing clean. No edema. No calf tenderness. Psych: Good insight. Not anxious or agitated. Neurologic: Alert and oriented X 3. Moves all ext.   Additional:    Medications:   Current Facility-Administered Medications   Medication Dose Route Frequency    [START ON 5/7/2021] ferrous sulfate tablet 325 mg  1 Tab Oral DAILY WITH BREAKFAST    pantoprazole (PROTONIX) tablet 40 mg  40 mg Oral BID    [START ON 5/7/2021] allopurinoL (ZYLOPRIM) tablet 150 mg  150 mg Oral DAILY    atorvastatin (LIPITOR) tablet 40 mg  40 mg Oral QHS    digoxin (LANOXIN) tablet 0.25 mg  0.25 mg Oral DAILY    [START ON 5/7/2021] ezetimibe (ZETIA) tablet 10 mg  10 mg Oral DAILY    [START ON 5/7/2021] finasteride (PROSCAR) tablet 5 mg  5 mg Oral DAILY    tamsulosin (FLOMAX) capsule 0.8 mg  0.8 mg Oral PCD    0.9% sodium chloride infusion 250 mL  250 mL IntraVENous PRN    0.9% sodium chloride infusion 250 mL  250 mL IntraVENous PRN    sodium chloride (NS) flush 5-40 mL  5-40 mL IntraVENous Q8H    sodium chloride (NS) flush 5-40 mL  5-40 mL IntraVENous PRN    sodium chloride (NS) flush 5-40 mL  5-40 mL IntraVENous Q8H    sodium chloride (NS) flush 5-40 mL  5-40 mL IntraVENous PRN    insulin lispro (HUMALOG) injection   SubCUTAneous Q6H    glucose chewable tablet 16 g  4 Tab Oral PRN    glucagon (GLUCAGEN) injection 1 mg  1 mg IntraMUSCular PRN    dextrose (D50W) injection syrg 12.5-25 g  25-50 mL IntraVENous PRN    pantoprazole (PROTONIX) 40 mg in 0.9% sodium chloride 10 mL injection  40 mg IntraVENous Q12H    sodium chloride (NS) flush 5-40 mL  5-40 mL IntraVENous Q8H    sodium chloride (NS) flush 5-40 mL  5-40 mL IntraVENous PRN    acetaminophen (TYLENOL) tablet 650 mg  650 mg Oral Q6H PRN    Or    acetaminophen (TYLENOL) suppository 650 mg  650 mg Rectal Q6H PRN       Labs:    Recent Results (from the past 24 hour(s))   GLUCOSE, POC    Collection Time: 05/05/21  4:59 PM   Result Value Ref Range    Glucose (POC) 149 (H) 70 - 110 mg/dL   PROTHROMBIN TIME + INR    Collection Time: 05/05/21  6:39 PM   Result Value Ref Range    Prothrombin time 17.0 (H) 11.5 - 15.2 sec    INR 1.4 (H) 0.8 - 1.2     METABOLIC PANEL, BASIC Collection Time: 05/05/21  6:39 PM   Result Value Ref Range    Sodium 143 136 - 145 mmol/L    Potassium 4.1 3.5 - 5.5 mmol/L    Chloride 112 (H) 100 - 111 mmol/L    CO2 23 21 - 32 mmol/L    Anion gap 8 3.0 - 18 mmol/L    Glucose 113 (H) 74 - 99 mg/dL    BUN 70 (H) 7.0 - 18 MG/DL    Creatinine 1.69 (H) 0.6 - 1.3 MG/DL    BUN/Creatinine ratio 41 (H) 12 - 20      GFR est AA 48 (L) >60 ml/min/1.73m2    GFR est non-AA 40 (L) >60 ml/min/1.73m2    Calcium 7.5 (L) 8.5 - 10.1 MG/DL   MAGNESIUM    Collection Time: 05/05/21  6:39 PM   Result Value Ref Range    Magnesium 1.4 (L) 1.6 - 2.6 mg/dL   CALCIUM, IONIZED    Collection Time: 05/05/21  9:05 PM   Result Value Ref Range    Ionized Calcium 1.11 (L) 1.12 - 1.32 MMOL/L   CBC W/O DIFF    Collection Time: 05/05/21  9:05 PM   Result Value Ref Range    WBC 9.7 4.6 - 13.2 K/uL    RBC 2.86 (L) 4.35 - 5.65 M/uL    HGB 7.4 (L) 13.0 - 16.0 g/dL    HCT 22.6 (L) 36.0 - 48.0 %    MCV 79.0 74.0 - 97.0 FL    MCH 25.9 24.0 - 34.0 PG    MCHC 32.7 31.0 - 37.0 g/dL    RDW 18.3 (H) 11.6 - 14.5 %    PLATELET 979 (L) 242 - 420 K/uL   GLUCOSE, POC    Collection Time: 05/05/21 11:26 PM   Result Value Ref Range    Glucose (POC) 111 (H) 70 - 101 mg/dL   METABOLIC PANEL, BASIC    Collection Time: 05/06/21  3:00 AM   Result Value Ref Range    Sodium 146 (H) 136 - 145 mmol/L    Potassium 3.8 3.5 - 5.5 mmol/L    Chloride 114 (H) 100 - 111 mmol/L    CO2 24 21 - 32 mmol/L    Anion gap 8 3.0 - 18 mmol/L    Glucose 105 (H) 74 - 99 mg/dL    BUN 83 (H) 7.0 - 18 MG/DL    Creatinine 1.87 (H) 0.6 - 1.3 MG/DL    BUN/Creatinine ratio 44 (H) 12 - 20      GFR est AA 43 (L) >60 ml/min/1.73m2    GFR est non-AA 35 (L) >60 ml/min/1.73m2    Calcium 7.8 (L) 8.5 - 10.1 MG/DL   CBC WITH AUTOMATED DIFF    Collection Time: 05/06/21  3:00 AM   Result Value Ref Range    WBC 9.6 4.6 - 13.2 K/uL    RBC 2.85 (L) 4.35 - 5.65 M/uL    HGB 7.3 (L) 13.0 - 16.0 g/dL    HCT 22.6 (L) 36.0 - 48.0 %    MCV 79.3 74.0 - 97.0 FL    MCH 25.6 24.0 - 34.0 PG    MCHC 32.3 31.0 - 37.0 g/dL    RDW 18.1 (H) 11.6 - 14.5 %    PLATELET 603 948 - 609 K/uL    NEUTROPHILS 70 40 - 73 %    LYMPHOCYTES 16 (L) 21 - 52 %    MONOCYTES 11 (H) 3 - 10 %    EOSINOPHILS 3 0 - 5 %    BASOPHILS 0 0 - 2 %    ABS. NEUTROPHILS 6.7 1.8 - 8.0 K/UL    ABS. LYMPHOCYTES 1.5 0.9 - 3.6 K/UL    ABS. MONOCYTES 1.1 0.05 - 1.2 K/UL    ABS. EOSINOPHILS 0.2 0.0 - 0.4 K/UL    ABS. BASOPHILS 0.0 0.0 - 0.1 K/UL    DF AUTOMATED     PROTHROMBIN TIME + INR    Collection Time: 05/06/21  3:00 AM   Result Value Ref Range    Prothrombin time 16.0 (H) 11.5 - 15.2 sec    INR 1.3 (H) 0.8 - 1.2     MAGNESIUM    Collection Time: 05/06/21  3:00 AM   Result Value Ref Range    Magnesium 1.8 1.6 - 2.6 mg/dL   PHOSPHORUS    Collection Time: 05/06/21  3:00 AM   Result Value Ref Range    Phosphorus 5.1 (H) 2.5 - 4.9 MG/DL   CALCIUM, IONIZED    Collection Time: 05/06/21  3:00 AM   Result Value Ref Range    Ionized Calcium 1.15 1.12 - 1.32 MMOL/L   GLUCOSE, POC    Collection Time: 05/06/21  5:35 AM   Result Value Ref Range    Glucose (POC) 117 (H) 70 - 110 mg/dL   HGB & HCT    Collection Time: 05/06/21  8:46 AM   Result Value Ref Range    HGB 7.3 (L) 13.0 - 16.0 g/dL    HCT 22.1 (L) 36.0 - 48.0 %   GLUCOSE, POC    Collection Time: 05/06/21 11:58 AM   Result Value Ref Range    Glucose (POC) 111 (H) 70 - 110 mg/dL   DIGOXIN    Collection Time: 05/06/21  1:14 PM   Result Value Ref Range    Digoxin level 0.5 (L) 0.9 - 2.0 NG/ML   EKG, 12 LEAD, INITIAL    Collection Time: 05/06/21  1:31 PM   Result Value Ref Range    Ventricular Rate 72 BPM    Atrial Rate 72 BPM    P-R Interval 342 ms    QRS Duration 166 ms    Q-T Interval 460 ms    QTC Calculation (Bezet) 503 ms    Calculated P Axis 51 degrees    Calculated R Axis 163 degrees    Calculated T Axis 19 degrees    Diagnosis       Electronic ventricular pacemaker  When compared with ECG of 20-OCT-2017 02:00,  Vent.  rate has decreased BY  15 BPM  Confirmed by Adelfo Esposito, MD, ----- ([id number]) on 5/6/2021 2:35:45 PM         Signed By: Saud Salgado MD     May 6, 2021      Disclaimer: Sections of this note are dictated using utilizing voice recognition software. Minor typographical errors may be present. If questions arise, please do not hesitate to contact me or call our department.

## 2021-05-06 NOTE — PROGRESS NOTES
[address] [place] [address] [facility]. Pulmonary, Critical Care, and Sleep Medicine  Transfer Summary    Name: Jing Bell MRN: [id number]   : 1945 Hospital: [address] [place] [address]   Date: 2021  Admission Date: 2021     Chart and notes reviewed. Data reviewed. I have evaluated all findings. [x]I have reviewed the flowsheet and previous days notes. []The patient is unable to give any meaningful history or review of systems because the patient is:  []Intubated []Sedated   []Unresponsive      []The patient is critically ill on      []Mechanical ventilation []Pressors   []BiPAP []         Interval HPI:  66 y. o.   male with previous history of GI bleed on coumadin with cc of  rectal bleeding since 11 AM on date of presentation. Abhinav Tavares reported having had 4-5 episodes bloody liquid stools. He was initially seen at [facility] free standing ER and he was transferred to our facility to secure GI consult. In the ED he became hypotensive and had bloody stools per nursing report. He was fluid responsive after 1 liter of NS. His lab is significant for Hgb drop from 9 to 7 and an INR of 3. He also received Vit K IV. CT abdomen and pelvis revealed extravasation of contrast from anterior wall of the distal descending colon which prompted him being brought to IR for coil embolization of his sigmoid bleed. He was transferred to the ICU post procedure where he received 2 units of PRBC and K-centra. His Hgb went up appropriately post transfusion and remained hemodynamically stable without vasopressors.     Subjective 21  Hospital Day:1  Overnight events:Uneventful night post IR procedure. Continous to have liquid melanotic stool  Mentation/Activity: Pt appears well rested, denies pain, shortness of breath, chest pain, dizziness.    Respiratory/ Secretions:Room air, no signs of distress  Hemodynamics:SBP 90s to 110s with MAP above 65  Urine output, bowel: Adequate urine output 0.8 ml/kg/hr, Stooling per pt report  Diet:NPO  Need for procedures:none              ROS:Review of systems not obtained due to patient factors. Events and notes from last 24 hours reviewed. Care plan discussed on multidisciplinary rounds. Patient Active Problem List   Diagnosis Code    Hypertension I10    Diabetes ([facility] [address] E11.9    Hyperlipidemia E78.5    Gout M10.9    AICD (automatic cardioverter/defibrillator) present Z95.810    Vitamin D deficiency E55.9    CHF (congestive heart failure) (HCC) I50.9    BPH (benign prostatic hypertrophy) N40.0    Anemia D64.9    Atrial fibrillation (HCC) I48.91    GI bleed K92.2    Symptomatic anemia D64.9    Rectal bleed K62.5    Acute gastric ulcer with hemorrhage K25.0    Dieulafoy lesion (hemorrhagic) of stomach and duodenum (CODE) K31.82    Aphasia R47.01    Type 2 diabetes with nephropathy (HCC) E11.21    Acute GI bleeding K92.2    Hypovolemia E86.1    Acute blood loss anemia D62    CAD (coronary artery disease) I25.10    Thoracic aortic aneurysm without rupture (HCC) I71.2    Lower GI bleed K92.2       Vital Signs:  Visit Vitals  BP (!) 110/54   Pulse 71   Temp 98.5 °F (36.9 °C)   Resp 15   Ht 5' 8\" (1.727 m)   Wt 85.4 kg (188 lb 4.4 oz)   SpO2 100%   BMI 28.63 kg/m²       O2 Device: None       Temp (24hrs), Av.5 °F (36.9 °C), Min:98 °F (36.7 °C), Max:98.8 °F (37.1 °C)       Intake/Output:   Last shift:      No intake/output data recorded.   Last 3 shifts:  190 -  0700  In: 2143.3 [I.V.:1240]  Out: 1675 [Urine:1675]    Intake/Output Summary (Last 24 hours) at 2021 0747  Last data filed at 2021 0400  Gross per 24 hour   Intake 2143.3 ml   Output 1675 ml   Net 468.3 ml        Ventilator Settings:                Current Facility-Administered Medications   Medication Dose Route Frequency    sodium chloride (NS) flush 5-40 mL  5-40 mL IntraVENous Q8H    sodium chloride (NS) flush 5-40 mL  5-40 mL IntraVENous Q8H    insulin lispro (HUMALOG) injection SubCUTAneous Q6H    pantoprazole (PROTONIX) 40 mg in 0.9% sodium chloride 10 mL injection  40 mg IntraVENous Q12H    sodium chloride (NS) flush 5-40 mL  5-40 mL IntraVENous Q8H         Telemetry: []Sinus []A-flutter []Paced    []A-fib []Multiple PVCs                  Physical Exam:      General: alert, awake, conversant  HEENT:  Anicteric sclerae; pale palpebral conjunctivae; mucosa moist  Resp:  Symmetrical chest expansion, no accessory muscle use; good airway entry; no rales/ wheezing/ rhonchi noted  CV:  S1, S2 present; Paced rythm  GI:  Abdomen soft, non-tender; (+) active bowel sounds  Extremities:  +2 pulses on all extremities; no edema/ cyanosis/ clubbing noted   Skin:  Warm; no rashes/ lesions noted, normal turgor/cap refill   Neurologic:  AAOx4, PERRLA  Devices:  Implanted AICD, PIVs      DATA:  MAR reviewed and pertinent medications noted or modified as needed    Labs:  Recent Labs     05/06/21  0300 05/05/21  2105 05/05/21  1022   WBC 9.6 9.7 5.5   HGB 7.3* 7.4* 6.1*   HCT 22.6* 22.6* 19.3*    101* 132*     Recent Labs     05/06/21  0300 05/05/21  1839 05/05/21  1022 05/05/21  0400   * 143 145 142   K 3.8 4.1 3.9 4.3   * 112* 115* 107   CO2 24 23 22 27   * 113* 116* 116*   BUN 83* 70* 59* 51*   CREA 1.87* 1.69* 1.58* 1.82*   CA 7.8* 7.5* 7.3* 8.4*   MG 1.8 1.4* 1.6  --    PHOS 5.1*  --  4.0  --    ALB  --   --   --  3.1*   ALT  --   --   --  25   INR 1.3* 1.4*  --   --      No results for input(s): PH, PCO2, PO2, HCO3, FIO2 in the last 72 hours. No results for input(s): FIO2I, IFO2, HCO3I, IHCO3, HCOPOC, PCO2I, PCOPOC, IPHI, PHI, PHPOC, PO2I, PO2POC in the last 72 hours.     No lab exists for component: IPOC2    Imaging:  [x]   I have personally reviewed the patients radiographs and reports  XR Results (most recent):  XR Results (most recent):  Results from Hospital Encounter encounter on 10/04/17   XR CHEST PORT    Narrative PORTABLE CHEST RADIOGRAPH     CPT CODE: 32539 INDICATION: Hypoxia with history of congestive heart failure. COMPARISON: None. FINDINGS:    Frontal view of the chest obtained at 0443 hours. Defibrillator is present with  visualized leads intact. Significant enlarged cardiomediastinal silhouette. Pulmonary vasculature is normal given hypoinflation. Apices are not well  visualized secondary to technique. No focal consolidation otherwise, significant  pleural effusion or pneumothorax. Impression IMPRESSION:    Significant enlarged cardiomediastinal silhouette without sequela of congestive  heart failure. CT Results (most recent):  Results from Hospital Encounter encounter on 05/04/21   CTA ABD PELV W WO CONT    Narrative CT without contrast and CT angiogram of the abdomen and pelvis    HISTORY: Lower GI bleeding    COMPARISON: None. TECHNIQUE: Multi detector helical axial scan through the abdomen and pelvis is  obtained in 2.5 mm thin section before and after dynamic nonionic IV contrast  administration per [facility] protocol. Parenchymal organ imaging is limited by  arterial phase of contrast administration. Contrast: Patient has borderline impaired renal function with 1.8 serum  creatinine level. 100 Visipaque 300 was administered intravenously. Good IV  hydration after scan is advised to ER physician. 3D postprocessed images, including surface shaded display, will produce for this  exam, permanently archived, and interpreted. Parenchymal organ imaging will be  limited by arterial phase contrast administration. All CT scans at this facility performed using dose optimization techniques as  appreciated to a performed exam, to include automated exposure control,  adjustment of the mA and or KU according to patient size (including appropriate  matching for site specific examination), or use of iterative reconstruction  technique.     FINDINGS:    CT ANGIOGRAM: There is advanced atherosclerotic vascular disease identified  involving the aorta and abdominal vasculature. AORTA: Mild tortuosity distally. No aneurysm or dissection. Marcell Pickles ILIAC ARTERIES: Very tortuous but patent without aneurysm. CELIAC ARTERY:Patent. SMA: Patent. RENAL ARTERIES: Patent. Solitary bilateral arteries. KAROLINA: Patent. GI TRACK: Within the lumen of very distal descending colon, there are  linear/serpiginous hyperdensities identified on early arterial phase, likely  from anterior colonic wall into the lumen on axial # and sagittal  #119/602. This was not seen on precontrast scan. The finding is highly concerned  for contrast extravasation from active bleeding. On the portal venous phase, the  hyperdensity becomes patchy and increased in volume on axial #174/3. No  additional contrast extravasation identified. CT ABDOMEN AND PELVIS:    LUNG BASES: Small left pleural effusion and minimal right pleural effusion. Massive cardiomegaly with massive pericardial effusion. .    ABDOMINAL/PELVIC VISCERA:  The liver, spleen, pancreas, and adrenal glands  appear unremarkable. Small gallbladder sludge is present. No biliary dilatation. The bilateral kidneys appear atrophic with symmetric perfusion. Several cortical  cysts present bilaterally, 2.9 x 3.7 cm at the lateral midpole of right kidney  and 1 cm at anterior midpole of left kidney. The stomach is poorly distended. The small and large bowel are nondilated. Normal appendix. No significant diverticular disease. The bladder Is suboptimally distended with prominent bladder wall, likely due to  under distention. The prostate is nonenlarged. No free air or free fluid. Moderate mesenteric edema and edema in abdominal and pelvic wall. PERITONEUM/RETROPERITONEUM: No significant adenopathy. CT OSSEOUS STRUCTURES: Spondylosis. Impression 1. Foci of contrast extravasation from anterior wall of the distal descending  colon, most likely the cause of the active GI bleeding as outlined above.     2. Advanced atherosclerotic aortic and vascular disease. 3. Massive cardiomegaly, massive pericardial effusion and minimal bibasilar  pleural effusion. 4. Mesenteric edema and anasarca. A wet read was provided to the ordering physician , Dr. Keith Rocha, by me upon the  interpretation at approximately  1045  hours. Thank you for your referral.               IMPRESSION:   · Acute blood loss anemia related to sigmoid bleed- pt reported bloody liquid stools, low Hgb requiring transfusion. IR performed embolization for diverticular sigmoid bleed (5/5)  · Acute hypovolemic shock from blood loss- SBP 80s to 90s, improved post fluid and blood resuscitation  · PETE pre-renal due to volume loss- elevated BUN and creatinine compared to baseline of 0.9, non-oliguric  · Chronic anemia- pt is on iron supplementation at home  · Atrial fibrillation-pt takes Coumadin, Digoxin  · Chronic HFrEF- last documented EF 45% on Feb 2015.  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 Isaak Gonzáles from [facility]        Patient Active Problem List   Diagnosis Code    Hypertension I10    Diabetes ([facility] [address] E11.9    Hyperlipidemia E78.5    Gout M10.9    AICD (automatic cardioverter/defibrillator) present Z95.810    Vitamin D deficiency E55.9    CHF (congestive heart failure) (HCC) I50.9    BPH (benign prostatic hypertrophy) N40.0    Anemia D64.9    Atrial fibrillation (HCC) I48.91    GI bleed K92.2    Symptomatic anemia D64.9    Rectal bleed K62.5    Acute gastric ulcer with hemorrhage K25.0    Dieulafoy lesion (hemorrhagic) of stomach and duodenum (CODE) K31.82    Aphasia R47.01    Type 2 diabetes with nephropathy ([facility] [address] E11.21    Acute GI bleeding K92.2    Hypovolemia E86.1    Acute blood loss anemia D62    CAD (coronary artery disease) I25.10    Thoracic aortic aneurysm without rupture (HCC) I71.2    Lower GI bleed K92.2        RECOMMENDATIONS:   Neuro:no acute need, avoid sedatives, avoid NSAIDs  Pulm: Keep O2sat >90,  Aspiration precautions, HOB>30  CVS:Monitor HD, MAP goal >65. GI: NPO. Diet per GI  Renal: Trend Cr, UOP. Hem/Onc: Trend H/H, monitor for s/o active bleeding. Serial H/H  I/D:Trend WBCs and temperature curve. Afebrile   Metabolic: Daily BMP, mag, phos. Trend lytes and replace as needed  Endocrine:Q6 glucoses. SSI. Avoid hypoglycemia. Musc/Skin: no acute needs  DNR  Discussed in interdisciplinary rounds  Appropriate for transfer to telemetry  Reached out to GI regarding melanotic stool, per GI, will watch the H/H closely and will consider upper GI if pt has signs  Of bleeding and for decrease in [facility].     Best practice :    Glycemic control:SSI  Sress ulcer prophylaxis.: Protonix BID  DVT prophylaxis. SCDs  Need for Lines assessed. High complexity decision making was performed during this consultation and evaluation. [x]       Pt is at high risk for further organ failure and dysfunction. Adrian Noriega NP   05/06/21  Pulmonary, Critical Care Medicine  [facility]         Pulmonary / Critical Care Physician:    Chart and note reviewed. Data reviewed. Seen on rounds earlier today. I have independently evaluated and examined the patient. I agree with the exam, assessment and plan of Ms. Zoë NP with my findings below. 75/M with shock and symptomatic anemia due to acute GI blood loss. CTA with extravasation from distal descending colon. Pt aggressively treated for life threatening bleed associated with Warfarin: K centra, coil embolization to bleeding source in the distal descending colon.  Pt with episodes of melena concerning for separate upper GI bleed, communicated with GI. Hemodynamics stabilized without pressors, HH stable post transfusion. Pt for transfer to Tele. Rest of plan per NP note.  Discussed in ICU IDR,           Virgil Marroquin MD  12:54 PM

## 2021-05-07 LAB
ANION GAP SERPL CALC-SCNC: 7 MMOL/L (ref 3–18)
BASOPHILS # BLD: 0 K/UL (ref 0–0.1)
BASOPHILS NFR BLD: 1 % (ref 0–2)
BUN SERPL-MCNC: 62 MG/DL (ref 7–18)
BUN/CREAT SERPL: 42 (ref 12–20)
CALCIUM SERPL-MCNC: 8 MG/DL (ref 8.5–10.1)
CHLORIDE SERPL-SCNC: 115 MMOL/L (ref 100–111)
CO2 SERPL-SCNC: 24 MMOL/L (ref 21–32)
CREAT SERPL-MCNC: 1.49 MG/DL (ref 0.6–1.3)
DIFFERENTIAL METHOD BLD: ABNORMAL
EOSINOPHIL # BLD: 0.3 K/UL (ref 0–0.4)
EOSINOPHIL NFR BLD: 5 % (ref 0–5)
ERYTHROCYTE [DISTWIDTH] IN BLOOD BY AUTOMATED COUNT: 19 % (ref 11.6–14.5)
GLUCOSE BLD STRIP.AUTO-MCNC: 117 MG/DL (ref 70–110)
GLUCOSE BLD STRIP.AUTO-MCNC: 148 MG/DL (ref 70–110)
GLUCOSE BLD STRIP.AUTO-MCNC: 260 MG/DL (ref 70–110)
GLUCOSE SERPL-MCNC: 97 MG/DL (ref 74–99)
HCT VFR BLD AUTO: 20.1 % (ref 36–48)
HCT VFR BLD AUTO: 22.1 % (ref 36–48)
HCT VFR BLD AUTO: 23.1 % (ref 36–48)
HGB BLD-MCNC: 6.5 G/DL (ref 13–16)
HGB BLD-MCNC: 7.2 G/DL (ref 13–16)
HGB BLD-MCNC: 7.5 G/DL (ref 13–16)
INR PPP: 1.2 (ref 0.8–1.2)
LYMPHOCYTES # BLD: 1.1 K/UL (ref 0.9–3.6)
LYMPHOCYTES NFR BLD: 17 % (ref 21–52)
MAGNESIUM SERPL-MCNC: 2 MG/DL (ref 1.6–2.6)
MCH RBC QN AUTO: 26 PG (ref 24–34)
MCHC RBC AUTO-ENTMCNC: 32.6 G/DL (ref 31–37)
MCV RBC AUTO: 79.8 FL (ref 74–97)
MONOCYTES # BLD: 0.6 K/UL (ref 0.05–1.2)
MONOCYTES NFR BLD: 10 % (ref 3–10)
NEUTS SEG # BLD: 4.4 K/UL (ref 1.8–8)
NEUTS SEG NFR BLD: 68 % (ref 40–73)
PHOSPHATE SERPL-MCNC: 3.3 MG/DL (ref 2.5–4.9)
PLATELET # BLD AUTO: 111 K/UL (ref 135–420)
POTASSIUM SERPL-SCNC: 3.5 MMOL/L (ref 3.5–5.5)
PROTHROMBIN TIME: 15.1 SEC (ref 11.5–15.2)
RBC # BLD AUTO: 2.77 M/UL (ref 4.35–5.65)
SODIUM SERPL-SCNC: 146 MMOL/L (ref 136–145)
WBC # BLD AUTO: 6.5 K/UL (ref 4.6–13.2)

## 2021-05-07 PROCEDURE — 65660000000 HC RM CCU STEPDOWN

## 2021-05-07 PROCEDURE — 99221 1ST HOSP IP/OBS SF/LOW 40: CPT | Performed by: NURSE PRACTITIONER

## 2021-05-07 PROCEDURE — 74011250637 HC RX REV CODE- 250/637: Performed by: HOSPITALIST

## 2021-05-07 PROCEDURE — 80048 BASIC METABOLIC PNL TOTAL CA: CPT

## 2021-05-07 PROCEDURE — 97161 PT EVAL LOW COMPLEX 20 MIN: CPT

## 2021-05-07 PROCEDURE — 83735 ASSAY OF MAGNESIUM: CPT

## 2021-05-07 PROCEDURE — 74011636637 HC RX REV CODE- 636/637: Performed by: HOSPITALIST

## 2021-05-07 PROCEDURE — 82962 GLUCOSE BLOOD TEST: CPT

## 2021-05-07 PROCEDURE — 85025 COMPLETE CBC W/AUTO DIFF WBC: CPT

## 2021-05-07 PROCEDURE — 84100 ASSAY OF PHOSPHORUS: CPT

## 2021-05-07 PROCEDURE — 97165 OT EVAL LOW COMPLEX 30 MIN: CPT

## 2021-05-07 PROCEDURE — 97535 SELF CARE MNGMENT TRAINING: CPT

## 2021-05-07 PROCEDURE — 76450000000

## 2021-05-07 PROCEDURE — 99232 SBSQ HOSP IP/OBS MODERATE 35: CPT | Performed by: HOSPITALIST

## 2021-05-07 PROCEDURE — 85610 PROTHROMBIN TIME: CPT

## 2021-05-07 PROCEDURE — 74011250637 HC RX REV CODE- 250/637: Performed by: INTERNAL MEDICINE

## 2021-05-07 PROCEDURE — 36415 COLL VENOUS BLD VENIPUNCTURE: CPT

## 2021-05-07 PROCEDURE — 85018 HEMOGLOBIN: CPT

## 2021-05-07 RX ORDER — WARFARIN 2.5 MG/1
2.5 TABLET ORAL DAILY
Status: ON HOLD | COMMUNITY
End: 2021-05-08 | Stop reason: SDUPTHER

## 2021-05-07 RX ORDER — DIGOXIN 125 MCG
0.12 TABLET ORAL DAILY
Status: DISCONTINUED | OUTPATIENT
Start: 2021-05-09 | End: 2021-05-08 | Stop reason: HOSPADM

## 2021-05-07 RX ORDER — INSULIN LISPRO 100 [IU]/ML
INJECTION, SOLUTION INTRAVENOUS; SUBCUTANEOUS
Status: DISCONTINUED | OUTPATIENT
Start: 2021-05-07 | End: 2021-05-08 | Stop reason: HOSPADM

## 2021-05-07 RX ORDER — WARFARIN SODIUM 5 MG/1
5 TABLET ORAL EVERY EVENING
Status: COMPLETED | OUTPATIENT
Start: 2021-05-07 | End: 2021-05-07

## 2021-05-07 RX ORDER — SODIUM CHLORIDE 9 MG/ML
250 INJECTION, SOLUTION INTRAVENOUS AS NEEDED
Status: DISCONTINUED | OUTPATIENT
Start: 2021-05-07 | End: 2021-05-08 | Stop reason: HOSPADM

## 2021-05-07 RX ADMIN — FINASTERIDE 5 MG: 5 TABLET, FILM COATED ORAL at 09:58

## 2021-05-07 RX ADMIN — Medication 10 ML: at 22:42

## 2021-05-07 RX ADMIN — Medication 10 ML: at 06:27

## 2021-05-07 RX ADMIN — Medication 10 ML: at 17:55

## 2021-05-07 RX ADMIN — FERROUS SULFATE TAB 325 MG (65 MG ELEMENTAL FE) 325 MG: 325 (65 FE) TAB at 09:03

## 2021-05-07 RX ADMIN — PANTOPRAZOLE 40 MG: 40 TABLET, DELAYED RELEASE ORAL at 09:03

## 2021-05-07 RX ADMIN — EZETIMIBE 10 MG: 10 TABLET ORAL at 09:03

## 2021-05-07 RX ADMIN — INSULIN LISPRO 6 UNITS: 100 INJECTION, SOLUTION INTRAVENOUS; SUBCUTANEOUS at 17:53

## 2021-05-07 RX ADMIN — WARFARIN SODIUM 5 MG: 5 TABLET ORAL at 18:24

## 2021-05-07 RX ADMIN — PANTOPRAZOLE 40 MG: 40 TABLET, DELAYED RELEASE ORAL at 17:53

## 2021-05-07 RX ADMIN — ALLOPURINOL 150 MG: 300 TABLET ORAL at 09:03

## 2021-05-07 RX ADMIN — TAMSULOSIN HYDROCHLORIDE 0.8 MG: 0.4 CAPSULE ORAL at 17:53

## 2021-05-07 RX ADMIN — ATORVASTATIN CALCIUM 40 MG: 40 TABLET, FILM COATED ORAL at 22:37

## 2021-05-07 RX ADMIN — DIGOXIN 0.25 MG: 125 TABLET ORAL at 09:03

## 2021-05-07 NOTE — ACP (ADVANCE CARE PLANNING)
Palliative Medicine    [facility] (Physician Orders for Scope of Treatment)       Date of conversation: 5/7/2021   Location: [facility]  Length (minutes): 30    Participants:   [x] Patient      Other persons present:   Name: Aly Leslie     Relationship to patient: Daughter    Name: Noah Monroy     Relationship to patient: Daughter      [employer]® ACP Facilitator: Margaretann Lanes, RN      Conversation Topics   (If Patient does not have decision making capacity, Agent/Surrogate responds based on understanding of how patient would respond if capable)    Understanding of Medical Condition/s AND Potential Complications:    Patient response: Pt was alert and oriented x 3. He was very clear that he would not want to be subjected to CPR or intubation for any reason. Pt's daughters were present and supported the pt's decisions. POST form was completed for DDNR status, limited medical interventions (NO intubation under any circumstances, ok with CPAP/BiPAP, avoid ICU if possible) and NO feeding tubes. Copies were given to the pt's daughters and also placed in the chart for scanning into EMR.       Needs to discuss with spiritual/Restorationist advisor: [] Yes  [x] No    Needs more information about illness and complications:  [] Yes  [x] No      Cardiopulmonary Resuscitation        Order Elected for CPR:  []  Attempt Resuscitation [x]  Do Not Attempt Resuscitation      When NOT in Cardiopulmonary Arrest, Order Elected:      [] Comfort Measures  [x] Limited Additional Interventions  [] Full Interventions    Artificially Administered Nutrition, Order Elected:    [x] No Feeding Tube   [] Feeding Tube for a defined trial period  [] Feeding Tube long-term if indicated    The following was provided (check all that apply):      Healthcare Decision Information Cards:   [] Help with Breathing Facts   [] Tube Feeding Facts   [] CPR Facts      [] Review of existing Advance Directive  [] Assistance with Completion of New Advance Directive   [x] Review of [place] POST Form       Meeting Outcomes:   [] ACP discussion completed   [x] [place] form completed  [x] [place] prepared for Provider review and signature   [x] Original placed on Chart, if in facility (form to be sent with patient at discharge)  [x] Copy given to healthcare agent    [] Copy scanned to electronic medical record       Follow-up plan:     [] Schedule follow-up conversation to continue planning   [x] Referred individual to Provider for additional questions/concerns   [x] Advised patient/agent/surrogate to review completed POST form and update if needed with changes in condition, patient preferences or care setting     [] This note routed to one or more involved healthcare providers    Pt is DNR/DNI. POST form signed by patient. BSRN and Attending are aware. Thank you for the Palliative Medicine consult and allowing us to participate in the care of Mr. Jena Pringle. Will continue to monitor and provide support.     Jodi Dee RN, BSN  Palliative Medicine Inpatient RN  [facility]  Palliative COPE Line: [phone] ([phone])

## 2021-05-07 NOTE — PROGRESS NOTES
Problem: Falls - Risk of  Goal: *Absence of Falls  Description: Document Alexander Jackson Fall Risk and appropriate interventions in the flowsheet.   Outcome: Progressing Towards Goal  Note: Fall Risk Interventions:  Mobility Interventions: Assess mobility with egress test, Patient to call before getting OOB         Medication Interventions: Patient to call before getting OOB    Elimination Interventions: Call light in reach, Patient to call for help with toileting needs    History of Falls Interventions: Bed/chair exit alarm, Consult care management for discharge planning         Problem: Patient Education: Go to Patient Education Activity  Goal: Patient/Family Education  Outcome: Progressing Towards Goal     Problem: Patient Education: Go to Patient Education Activity  Goal: Patient/Family Education  Outcome: Progressing Towards Goal     Problem: Upper and Lower GI Bleed: Day 1  Goal: Off Pathway (Use only if patient is Off Pathway)  Outcome: Progressing Towards Goal  Goal: Activity/Safety  Outcome: Progressing Towards Goal  Goal: Consults, if ordered  Outcome: Progressing Towards Goal  Goal: Diagnostic Test/Procedures  Outcome: Progressing Towards Goal  Goal: Nutrition/Diet  Outcome: Progressing Towards Goal  Goal: Discharge Planning  Outcome: Progressing Towards Goal  Goal: Medications  Outcome: Progressing Towards Goal  Goal: Respiratory  Outcome: Progressing Towards Goal  Goal: Treatments/Interventions/Procedures  Outcome: Progressing Towards Goal  Goal: Psychosocial  Outcome: Progressing Towards Goal  Goal: *Optimal pain control at patient's stated goal  Outcome: Progressing Towards Goal  Goal: *Hemodynamically stable  Outcome: Progressing Towards Goal  Goal: *Demonstrates progressive activity  Outcome: Progressing Towards Goal

## 2021-05-07 NOTE — PROGRESS NOTES
Problem: Mobility Impaired (Adult and Pediatric)  Goal: *Acute Goals and Plan of Care (Insert Text)  Outcome: Resolved/Met     PHYSICAL THERAPY EVALUATION AND DISCHARGE    Patient: Jenifer Duggan (33 y.o. male)  Date: 5/7/2021  Primary Diagnosis: Lower GI bleed [K92.2]        Precautions:      WBAT  PLOF:     ASSESSMENT :  Based on the objective data described below, the patient presents with baseline functional mobility level with no acute PT needs at this time. Pt is received in bed in NAD and agreeable. Pt demonstrates BLE ROM and strength WFL with no pain reported. Pt performs bed mobility with mod ind and ambulates in hallways with SPC and decreased speed with no LOB noted x 75 ft. Pt then returns to room and is positioned for comfort in recliner with all needs met. At this time, pt reports he is able to mobilize as he does at home and does not demonstrate an acute PT need at this time. Will sign off however if pts needs change, please reorder. Per nurse, pt does not need a  chair alarm at this time, pt still encouraged to call for assist prior to getting up. Patient does not require further skilled intervention at this level of care. PLAN :  Recommendations and Planned Interventions:   No formal PT needs identified at this time. Discharge Recommendations: None  Further Equipment Recommendations for Discharge: N/A     SUBJECTIVE:   Patient stated I tried getting up earlier but the bed went off.     OBJECTIVE DATA SUMMARY:     Past Medical History:   Diagnosis Date    AICD (automatic cardioverter/defibrillator) present     Anemia of chronic disease     Atrial fibrillation (HCC)     BPH (benign prostatic hypertrophy)     CAD (coronary artery disease)     CHF (congestive heart failure) (HCC)     Chronic gastritis with hemorrhage     CKD (chronic kidney disease), stage III ([facility])     Current use of long term anticoagulation     CVA (cerebral vascular accident) ([facility]     Diabetes ([facility]     Former smoker     Gout     Hyperlipidemia     Hypertension     Pacemaker     Thoracic aortic aneurysm ([facility]     UGIB (upper gastrointestinal bleed)     Vitamin D deficiency     Vitamin D deficiency      Past Surgical History:   Procedure Laterality Date    COLONOSCOPY N/A 4/3/2017    COLONOSCOPY performed by Benoit Pascual MD at [facility]    HX OTHER SURGICAL      colonoscopy    HX PACEMAKER      defibrillator    HX PACEMAKER PLACEMENT      HX TONSILLECTOMY      IR OCCL Rolly Ventura W SI  5/5/2021     Barriers to Learning/Limitations: None  Compensate with: N/A  Home Situation:   Home Situation  Home Environment: Private residence  One/Two Story Residence: Two story, live on 1st floor  Living Alone: No  Support Systems: Child(blayne)  Patient Expects to be Discharged to[de-identified] Private residence  Current DME Used/Available at Home: tamara Bernal  Critical Behavior:  Neurologic State: Alert  Orientation Level: Oriented X4  Cognition: Follows commands  Safety/Judgement: Fall prevention  Psychosocial  Patient Behaviors: Calm; Cooperative                 Strength:    Strength: Within functional limits       Tone & Sensation:   Tone: Normal    Sensation: Intact    Range Of Motion:  AROM: Within functional limits    Functional Mobility:  Bed Mobility:  Rolling: Modified independent  Supine to Sit: Modified independent  Sit to Supine: Modified independent  Scooting: Modified independent  Transfers:  Sit to Stand: Modified independent  Stand to Sit: Modified independent       Balance:   Sitting: Intact  Standing: Intact; With support    Ambulation/Gait Training:  Distance (ft): 75 Feet (ft)  Assistive Device: Cane, straight  Ambulation - Level of Assistance: Modified independent        Gait Abnormalities: Decreased step clearance    Speed/Roxie: Pace decreased (<100 feet/min)  Step Length: Right shortened;Left shortened       Pain:  Pain level pre-treatment: 0/10   Pain level post-treatment: 0/10      Activity Tolerance: Good    Please refer to the flowsheet for vital signs taken during this treatment. After treatment:   [x]         Patient left in no apparent distress sitting up in chair  []         Patient left in no apparent distress in bed  [x]         Call bell left within reach  [x]         Nursing notified  []         Caregiver present  []         Bed alarm activated  []         SCDs applied    COMMUNICATION/EDUCATION:   [x]         Role of Physical Therapy in the acute care setting. [x]         Fall prevention education was provided and the patient/caregiver indicated understanding. [x]         Patient/family have participated as able in goal setting and plan of care. [x]         Patient/family agree to work toward stated goals and plan of care. []         Patient understands intent and goals of therapy, but is neutral about his/her participation. []         Patient is unable to participate in goal setting/plan of care: ongoing with therapy staff.  []         Other:     Thank you for this referral.  Monica Olmedo   Time Calculation: 14 mins      Eval Complexity: History: MEDIUM  Complexity : 1-2 comorbidities / personal factors will impact the outcome/ POC Exam:MEDIUM Complexity : 3 Standardized tests and measures addressing body structure, function, activity limitation and / or participation in recreation  Presentation: LOW Complexity : Stable, uncomplicated  Clinical Decision Making:Low Complexity    Overall Complexity:LOW

## 2021-05-07 NOTE — PROGRESS NOTES
Physician Progress Note      Ashley Barahona  CSN #:                  [id number]  :                       1945  ADMIT DATE:       2021 11:21 PM  DISCH DATE:  RESPONDING  PROVIDER #:        German Diaz MD          QUERY TEXT:    Patient admitted with sigmoid bleed and is on chronic anticoagulation. If possible, please document in the progress notes and discharge summary if you are evaluating and/or treating any of the following: The medical record reflects the following:  Risk Factors: 66 y.o. male with afib  Clinical Indicators: Per  critical care note - 75/M with shock and symptomatic anemia due to acute GI blood loss. CTA with extravasation from distal descending colon. S/p coil embolization . Currently on second unit of PRBC. Given K Centra earlier as pt with life threatening bleed in the setting of Warfarin use. Treatment: selective sigmoid coil embolization, Vit K, FFP, PRBC    Thank you, Juan F Ngo RN, [facility]  [phone]  Options provided:  -- Bleeding associated with coumadin. -- Bleeding unrelated to anticoagulation  -- Other - I will add my own diagnosis  -- Disagree - Not applicable / Not valid  -- Disagree - Clinically unable to determine / Unknown  -- Refer to Clinical Documentation Reviewer    PROVIDER RESPONSE TEXT:    Patient bleeding is unrelated to anticoagulation.     Query created by: Pham Cortes on 2021 4:33 PM      Electronically signed by:  German Diaz MD 2021 8:26 PM

## 2021-05-07 NOTE — CONSULTS
[id number] [facility] 54: [phone]  [facility]: [address] [place] [address] Way: [phone]    Patient Name: Shady Segura  YOB: 1945    Date of Initial Consult: 5/7/2021   Reason for Consult: goals of care  Requesting Provider: Dr Rbuin Quezada  Primary Care Physician: Ulysses Grist, MD      SUMMARY:   Shady Segura is a 66y.o. year old with a past history of anemia, a fib, BPH, diabetes, hypertension, CHF, gout, who was admitted on 5/4/2021 from home with a diagnosis of acute blood loss anemia, related to sigmoid bleed, s/p IR embolization for diverticulum bleed. Current medical issues leading to Palliative Medicine involvement include: 66year old male who presented with GI bleed. He has expressed he would not wish for CPR or intubation. Palliative medicine is consulted for goals of care affirmation. PALLIATIVE DIAGNOSES:   1. Goals of care   2. Acute anemia due to blood loss   3. GI bleed   4. Hx of embolic CVA       PLAN:   1. Goals of care Patient seen along with Ms Eleanor Tripathi, [address] [place] [address]. Mr Jose Armando Kauffman is alert and oriented x 3 denies pain or shortness of breath. Tells us he feels pretty good, no nausea. His daughters at bedside, [facility] and [employer]. Patient was clear he did not wish for chest compressions, shock at cardiac arrest, no intubation for any reason. His daughters were present and supportive of this of his wishes. POST form introduced and discussed, signed by patient for DNR/DNI limited interventions and no feeding tubes. Copy to patient and family and chart. Mr Jose Armando Kauffman is , has 3 [facility]. [facility] and [employer] live with him. Explained hierarchy of legal decision makers in [place], his wife has passed, it default to a majority of his 3 children, patient shared he was comfortable with this and elected to not complete an AMD. Goals of care DNR/DNI limited interventions, no feeding tubes  2.  Acute anemia due to blood loss likely secondary to sigmoid bleed denies further bleeding h/h 7.5 23.1   3. GI bleed sigmoid diverticulum bleed. S/P embolization by IR  4. Hx of CVA on warfarin currently being held. Uses cane at times to help with ambulation, PT evaled and signed off due to no needs, able to ambulate 75 ft   5. Initial consult note routed to primary continuity provider  6. Communicated plan of care with: Palliative IDT, patient, daughters [place], and [facility]       Patient/Health Care Proxy Stated Goals: Prolong life      TREATMENT PREFERENCES:   Code Status: DNR/DNI     Advance Care Planning:  [] The [place] Interdisciplinary Team has updated the ACP Navigator with [address] and Patient Capacity    Primary Decision Maker ([address]):   No AMD medical decision making is a majority of 3 children     Medical Interventions: Limited additional interventions   Artificially Administered Nutrition: No feeding tube     Other:  As far as possible, the palliative care team has discussed with patient / health care proxy about goals of care / treatment preferences for patient.      HISTORY:     History obtained from: chart, patient and family     CHIEF COMPLAINT: GI bleed     HPI/SUBJECTIVE:    The patient is:   [x] Verbal and participatory  [] Non-participatory due to:   Please see summary      Clinical Pain Assessment (nonverbal scale for nonverbal patients): Clinical Pain Assessment  Severity: 0     Activity (Movement): Lying quietly, normal position    Duration: for how long has pt been experiencing pain (e.g., 2 days, 1 month, years)  Frequency: how often pain is an issue (e.g., several times per day, once every few days, constant)     FUNCTIONAL ASSESSMENT:     Palliative Performance Scale (PPS):  PPS: 50    ECOG  ECOG Status : Ambulatory, but unable to carry out work activities     PSYCHOSOCIAL/SPIRITUAL SCREENING:      Any spiritual / Christianity concerns:  [] Yes /  [x] No    Caregiver Burnout:  [] Yes /  [x] No /  [] No Caregiver Present      Anticipatory grief assessment:   [x] Normal  / [] Maladaptive        REVIEW OF SYSTEMS:     Positive and pertinent negative findings in ROS are noted above in HPI. The following systems were [x] reviewed / [] unable to be reviewed as noted in HPI  Other findings are noted below. Systems: constitutional, ears/nose/mouth/throat, respiratory, gastrointestinal, genitourinary, musculoskeletal, integumentary, neurologic, psychiatric, endocrine. Positive findings noted below. Modified ESAS Completed by: provider   Fatigue: 2 Drowsiness: 0   Depression: 0 Pain: 0   Anxiety: 0 Nausea: 0     Dyspnea: 0           Stool Occurrence(s): 1        PHYSICAL EXAM:     Wt Readings from Last 3 Encounters:   05/07/21 83.8 kg (184 lb 11.9 oz)   01/21/21 87.1 kg (192 lb)   01/07/21 88.9 kg (196 lb)     Blood pressure (!) 105/59, pulse 76, temperature 97.4 °F (36.3 °C), resp. rate 18, height 5' 8\" (1.727 m), weight 83.8 kg (184 lb 11.9 oz), SpO2 100 %.   Pain:  Pain Scale 1: Numeric (0 - 10)  Pain Intensity 1: 0                 Last bowel movement: x 1 5/6/2021     Constitutional: well developed gentleman reclining in bed talkative, alert, smiling in NAD   Eyes: pupils equal   ENMT: MM moist   Cardiovascular: RR  Respiratory: respirations not labored on room air   Skin: warm and dry   Neurologic: alert and oriented x 3   Psychiatric: full affect, no hallucinations         HISTORY:     Active Problems:    CHF (congestive heart failure) ([facility]) ()      Type 2 diabetes with nephropathy ([facility] [address] (4/24/2018)      Acute GI bleeding (5/5/2021)      Hypovolemia (5/5/2021)      Acute blood loss anemia (5/5/2021)      CAD (coronary artery disease) ()      Thoracic aortic aneurysm without rupture (HCC) ()      Past Medical History:   Diagnosis Date    AICD (automatic cardioverter/defibrillator) present     Anemia of chronic disease     Atrial fibrillation ([facility]     BPH (benign prostatic hypertrophy)     CAD (coronary artery disease)     CHF (congestive heart failure) (HCC)     Chronic gastritis with hemorrhage     CKD (chronic kidney disease), stage III (HCC)     Current use of long term anticoagulation     CVA [facility]ce[facility] vascular accident) ([facility]     Diabetes ([facility]     Former smoker     Gout     Hyperlipidemia     Hypertension     Pacemaker     Thoracic aortic aneurysm ([facility]     UGIB (upper gastrointestinal bleed)     Vitamin D deficiency     Vitamin D deficiency       Past Surgical History:   Procedure Laterality Date    COLONOSCOPY N/A 4/3/2017    COLONOSCOPY performed by Shaun Mathews MD at [address] HX OTHER SURGICAL      colonoscopy    HX PACEMAKER      defibrillator    HX PACEMAKER PLACEMENT      HX TONSILLECTOMY      IR OCCL Lenore Jacinda W SI  2021      No family history on file. History reviewed, no pertinent family history.   Social History     Tobacco Use    Smoking status: Former Smoker     Quit date: 1981     Years since quittin.8    Smokeless tobacco: Never Used   Substance Use Topics    Alcohol use: No     Allergies   Allergen Reactions    Tradjenta [Linagliptin] Anaphylaxis    Ace Inhibitors Angioedema      Current Facility-Administered Medications   Medication Dose Route Frequency    insulin lispro (HUMALOG) injection   SubCUTAneous AC&HS    [START ON 2021] digoxin (LANOXIN) tablet 0.125 mg  0.125 mg Oral DAILY    ferrous sulfate tablet 325 mg  1 Tab Oral DAILY WITH BREAKFAST    pantoprazole (PROTONIX) tablet 40 mg  40 mg Oral BID    allopurinoL (ZYLOPRIM) tablet 150 mg  150 mg Oral DAILY    atorvastatin (LIPITOR) tablet 40 mg  40 mg Oral QHS    ezetimibe (ZETIA) tablet 10 mg  10 mg Oral DAILY    finasteride (PROSCAR) tablet 5 mg  5 mg Oral DAILY    tamsulosin (FLOMAX) capsule 0.8 mg  0.8 mg Oral PCD    0.9% sodium chloride infusion 250 mL  250 mL IntraVENous PRN    0.9% sodium chloride infusion 250 mL  250 mL IntraVENous PRN    sodium chloride (NS) flush 5-40 mL  5-40 mL IntraVENous Q8H    sodium chloride (NS) flush 5-40 mL  5-40 mL IntraVENous PRN    sodium chloride (NS) flush 5-40 mL  5-40 mL IntraVENous Q8H    sodium chloride (NS) flush 5-40 mL  5-40 mL IntraVENous PRN    glucose chewable tablet 16 g  4 Tab Oral PRN    glucagon (GLUCAGEN) injection 1 mg  1 mg IntraMUSCular PRN    dextrose (D50W) injection syrg 12.5-25 g  25-50 mL IntraVENous PRN    sodium chloride (NS) flush 5-40 mL  5-40 mL IntraVENous Q8H    sodium chloride (NS) flush 5-40 mL  5-40 mL IntraVENous PRN    acetaminophen (TYLENOL) tablet 650 mg  650 mg Oral Q6H PRN    Or    acetaminophen (TYLENOL) suppository 650 mg  650 mg Rectal Q6H PRN        LAB AND IMAGING FINDINGS:     Lab Results   Component Value Date/Time    WBC 6.5 05/07/2021 01:59 AM    HGB 7.5 (L) 05/07/2021 08:40 AM    PLATELET 315 (L) 56/22/1822 01:59 AM     Lab Results   Component Value Date/Time    Sodium 146 (H) 05/07/2021 01:59 AM    Potassium 3.5 05/07/2021 01:59 AM    Chloride 115 (H) 05/07/2021 01:59 AM    CO2 24 05/07/2021 01:59 AM    BUN 62 (H) 05/07/2021 01:59 AM    Creatinine 1.49 (H) 05/07/2021 01:59 AM    Calcium 8.0 (L) 05/07/2021 01:59 AM    Magnesium 2.0 05/07/2021 01:59 AM    Phosphorus 3.3 05/07/2021 01:59 AM      Lab Results   Component Value Date/Time    Alk.  phosphatase 60 05/05/2021 04:00 AM    Protein, total 5.6 (L) 05/05/2021 04:00 AM    Albumin 3.1 (L) 05/05/2021 04:00 AM    Globulin 2.5 05/05/2021 04:00 AM     Lab Results   Component Value Date/Time    INR 1.2 05/07/2021 01:59 AM    Prothrombin time 15.1 05/07/2021 01:59 AM    aPTT 32.0 10/20/2017 01:29 AM      Lab Results   Component Value Date/Time    Iron 50 10/21/2017 01:02 AM    TIBC 302 10/21/2017 01:02 AM    Iron % saturation 17 10/21/2017 01:02 AM      No results found for: PH, PCO2, PO2  No components found for: [place]   Lab Results   Component Value Date/Time     10/20/2017 01:29 AM    CK - MB <1.0 10/20/2017 01:29 AM Total time: 30 minutes   Counseling / coordination time, spent as noted above:   > 50% counseling / coordination: yes with patient and family concerning goals of care     Prolonged service was provided for  []30 min   []75 min in face to face time in the presence of the patient, spent as noted above.   Time Start:   Time End:

## 2021-05-07 NOTE — PROGRESS NOTES
Discharge planning    Reviewed chart. CM will continue to monitor for PT/OT notes to assist with discharge planning.       ALBERTINA Garland, RN  Pager # [phone]  Care Manager

## 2021-05-07 NOTE — PROGRESS NOTES
OCCUPATIONAL THERAPY EVALUATION/DISCHARGE    Patient: Dave Reid (89 y.o. male)  Date: 5/7/2021  Primary Diagnosis: Lower GI bleed [K92.2]        Precautions:   (standard)  PLOF: Pt reports being Mod Ind for ADLs and functional mobility using cane. ASSESSMENT AND RECOMMENDATIONS:  Based on the objective data described below, the patient presents with ability to perform basic ADLs and functional transfers at baseline level of function. Pt performed/simulated UB/LB ADLs with Mod Ind for seated and std aspects. Pt demonstrates good safety awareness dueing all standing tasks, and reports being familiar with mult EC techniques he utilizes at home. Pt lives with supportive daughters available to assist as needed. Skilled occupational therapy is not indicated at this time. Discharge Recommendations: Home with family Supervision prn  Further Equipment Recommendations for Discharge: shower chair      SUBJECTIVE:   Patient stated I am feeling great actually. I just came back from the bathroom.     OBJECTIVE DATA SUMMARY:     Past Medical History:   Diagnosis Date    AICD (automatic cardioverter/defibrillator) present     Anemia of chronic disease     Atrial fibrillation (HCC)     BPH (benign prostatic hypertrophy)     CAD (coronary artery disease)     CHF (congestive heart failure) (HCC)     Chronic gastritis with hemorrhage     CKD (chronic kidney disease), stage III (HCC)     Current use of long term anticoagulation     CVA (cerebral vascular accident) ([address]     Diabetes ([facility] [address]     Former smoker     Gout     Hyperlipidemia     Hypertension     Pacemaker     Thoracic aortic aneurysm ([facility] [address]     UGIB (upper gastrointestinal bleed)     Vitamin D deficiency     Vitamin D deficiency      Past Surgical History:   Procedure Laterality Date    COLONOSCOPY N/A 4/3/2017    COLONOSCOPY performed by Rajni Gomez MD at [facility]    HX OTHER SURGICAL      colonoscopy    HX PACEMAKER      defibrillator    HX PACEMAKER PLACEMENT      HX TONSILLECTOMY      IR OCCL TXCATH HEMMORAGE W SI  5/5/2021     Barriers to Learning/Limitations: None  Compensate with: visual, verbal, tactile, kinesthetic cues/model    Home Situation:   Home Situation  Home Environment: Private residence  One/Two Story Residence: Two story, live on 1st floor  Living Alone: No  Support Systems: Child(blayne)  Patient Expects to be Discharged to[de-identified] Private residence  Current DME Used/Available at Home: Cane, straight, Grab bars  Tub or Shower Type: Tub  [x]     Right hand dominant   []     Left hand dominant    Cognitive/Behavioral Status:  Neurologic State: Alert  Orientation Level: Oriented X4  Cognition: Follows commands  Safety/Judgement: Fall prevention;Home safety    Skin: visible skin intact  Edema: none noted  Vision/Perceptual:       Acuity: Able to read clock/calendar on wall without difficulty     Coordination: BUE  Coordination: Within functional limits  Fine Motor Skills-Upper: Left Intact; Right Intact    Gross Motor Skills-Upper: Left Intact; Right Intact  Balance:  Sitting: Intact  Standing: Intact; With support    Strength: BUE  Strength: Generally decreased, functional   Tone & Sensation: BUE  Tone: Normal  Sensation: Intact   Range of Motion: BUE  AROM: Generally decreased, functional     Functional Mobility and Transfers for ADLs:  Bed Mobility:  Rolling: Modified independent  Supine to Sit: Modified independent  Sit to Supine: Modified independent  Scooting: Modified independent  Transfers:  Sit to Stand: Modified independent  Stand to Sit: Modified independent   Toilet Transfer : Modified independent        ADL Assessment:  Feeding: Modified independent  Oral Facial Hygiene/Grooming: Modified Independent  Bathing: Modified independent  Upper Body Dressing: Modified independent  Lower Body Dressing: Modified independent  Toileting: Modified independent   ADL Intervention:     Cognitive Retraining  Safety/Judgement: Fall prevention;Home safety  Pain:  Pain level pre-treatment: 0/10   Pain level post-treatment: 0/10     Activity Tolerance:   Good  Please refer to the flowsheet for vital signs taken during this treatment. After treatment:   [x]  Patient left in no apparent distress sitting up in chair  []  Patient left in no apparent distress in bed  [x]  Call bell left within reach  [x]  Nursing notified  []  Caregiver present  []  Bed alarm activated    COMMUNICATION/EDUCATION:   [x]      Role of Occupational Therapy in the acute care setting  [x]      Home safety education was provided and the patient/caregiver indicated understanding. [x]      Patient/family have participated as able and agree with findings and recommendations. []      Patient is unable to participate in plan of care at this time. Thank you for this referral.  Milla Breaux OTR/L  Time Calculation: 23 mins      Eval Complexity: History: LOW Complexity : Brief history review ; Examination: LOW Complexity : 1-3 performance deficits relating to physical, cognitive , or psychosocial skils that result in activity limitations and / or participation restrictions ;    Decision Making:LOW Complexity : No comorbidities that affect functional and no verbal or physical assistance needed to complete eval tasks

## 2021-05-07 NOTE — PROGRESS NOTES
Hospitalist Progress Note    Patient: Heather Pond MRN: [id number]  CSN: [id number]    YOB: 1945  Age: 66 y.o. Sex: male    DOA: 5/4/2021 LOS:  LOS: 2 days          Patient denies chest pain, cough, shortness of breath. Denies bleeding from anywhere. He states he is hungry and ready to advance his diet. Denies constipation. 2 daughters at bedside with whom he resides. Patient denies personal use of tobacco or secondhand smoke exposure. He did well with physical therapy, occupational therapy. Daughter states his ankle edema comes and goes. Assessment/Plan       1. Acute blood loss anemia related to sigmoid bleed s/p IR performed embolization for diverticular sigmoid bleed (5/5/2021)  2. Hypovolemic shock from blood loss, improved  3. CKD stage III  4. Atrial fibrillation on warfarin (held), and digoxin. Resume warfarin tonight if okay with GI.  5.  CAD  6. Diabetes type 2. SSI  7. Hypertension  8. Dyslipidemia on Lipitor  9. History of embolic CVA. Resume warfarin when okay with GI.  10.  History of thoracic aortic aneurysm without rupture, followed by Dr. Geovani Zhang, [facility]  11. History of prostate enlargement. On Proscar, Flomax. 12.  Anemia, microcytic hypochromic. 13.  Anemia requiring transfusion  14. DNR. PT OT no recs. Patient does not have advanced care plan, durable DNR, or post form scanned into our system. Discussed with daughter over the phone, she states she thought all of this was taken care of during his last admission, and there are no documents at home. Consult palliative care. Discussed on IDT.        Additional Notes:      Case discussed with:  [x]Patient  [x]Family  [x]Nursing  [x]Case Management  DVT Prophylaxis:  []Lovenox  []Hep SQ  [x]SCDs  []Coumadin   []On Heparin gtt    Vital signs/Intake and Output:  Visit Vitals  /62 (BP 1 Location: Right upper arm, BP Patient Position: At rest)   Pulse 71   Temp 97.9 °F (36.6 °C)   Resp 18   Ht 5' 8\" (1.727 m)   Wt 83.8 kg (184 lb 11.9 oz)   SpO2 100%   BMI 28.09 kg/m²     Current Shift:  No intake/output data recorded. Last three shifts:  05/05 1901 - 05/07 0700  In: 110 [I.V.:110]  Out: 1200 [Urine:1200]    Awake alert and oriented x4. Sitting up in chair eating lunch. 2 daughters at bedside. Normocephalic atraumatic pupils equally round reactive to light. Moist mucous membranes  Regular rate and rhythm  Clear to auscultation bilaterally  Abdomen soft nontender nondistended normoactive bowel sounds  Trace ankle edema. Dorsalis pedis pulses 2+ bilaterally  Other than hearing, no focal deficit  No rash to visible skin    Medications Reviewed      Labs: Results:       Chemistry Recent Labs     05/07/21  0159 05/06/21  0300 05/05/21  1839 05/05/21  0400 05/05/21  0400   GLU 97 105* 113*   < > 116*   * 146* 143   < > 142   K 3.5 3.8 4.1   < > 4.3   * 114* 112*   < > 107   CO2 24 24 23   < > 27   BUN 62* 83* 70*   < > 51*   CREA 1.49* 1.87* 1.69*   < > 1.82*   CA 8.0* 7.8* 7.5*   < > 8.4*   AGAP 7 8 8   < > 8   BUCR 42* 44* 41*   < > 28*   AP  --   --   --   --  60   TP  --   --   --   --  5.6*   ALB  --   --   --   --  3.1*   GLOB  --   --   --   --  2.5   AGRAT  --   --   --   --  1.2    < > = values in this interval not displayed. CBC w/Diff Recent Labs     05/07/21  0840 05/07/21  0159 05/06/21 2037 05/06/21 0300 05/06/21  0300 05/05/21  2105 05/05/21  1022   WBC  --  6.5  --   --  9.6 9.7 5.5   RBC  --  2.77*  --   --  2.85* 2.86* 2.47*   HGB 7.5* 7.2* 7.5*   < > 7.3* 7.4* 6.1*   HCT 23.1* 22.1* 23.4*   < > 22.6* 22.6* 19.3*   PLT  --  111*  --   --  136 101* 132*   GRANS  --  68  --   --  70  --  83*   LYMPH  --  17*  --   --  16*  --  12*   EOS  --  5  --   --  3  --  0    < > = values in this interval not displayed. Cardiac Enzymes No results for input(s): CPK, CKND1, VALERIO in the last 72 hours.     No lab exists for component: CKRMB, TROIP   Coagulation Recent Labs 05/07/21  0159 05/06/21  0300   PTP 15.1 16.0*   INR 1.2 1.3*       Lipid Panel Lab Results   Component Value Date/Time    Cholesterol, total 145 10/21/2017 01:02 AM    HDL Cholesterol 49 10/21/2017 01:02 AM    LDL, calculated 83.6 10/21/2017 01:02 AM    VLDL, calculated 12.4 10/21/2017 01:02 AM    Triglyceride 62 10/21/2017 01:02 AM    CHOL/HDL Ratio 3.0 10/21/2017 01:02 AM      BNP No results for input(s): BNPP in the last 72 hours. Liver Enzymes Recent Labs     05/05/21  0400   TP 5.6*   ALB 3.1*   AP 60      Thyroid Studies No results found for: T4, T3U, TSH, TSHEXT     Procedures/imaging: see electronic medical records for all procedures/Xrays and details which were not copied into this note but were reviewed prior to creation of Plan.

## 2021-05-07 NOTE — ROUTINE PROCESS
Bedside shift change report given to Ko Abraham RN (oncoming nurse) by Michelle Manjarrez RN (offgoing nurse). Report included the following information SBAR, Kardex, Intake/Output and MAR.

## 2021-05-07 NOTE — PROGRESS NOTES
[email or web address]  [phone]    Gastroenterology follow up-Progress note    Impression:  1. GI bleed - large volume hematochezia, began 11 am yesterday. - Prior history GIB in 2017 - melenea and hematochezia, gastritis and ulcer  - s/p IR embolization of sigmoid bleed 5/5  2. A fib - on Coumadin, INR 3 at outside hospital  S/p Vit K and FFP  3. Anemia - Acute on chronic, hx HUMERA  - s/p 2 units PRBC on 5/5  - H/H stable at 7.5/23.1  S/p PRBC yesterday, stable at 7.3  4. CHF s/p AICD  5. DM2     Prior scopes:  [place] 2017 - severe diverticulosis, 5 year repeat recommended, personal hx polyps  EGD 10/2017 - clean based ulcer    CTA 5/5 IMPRESSION     1. Foci of contrast extravasation from anterior wall of the distal descending  colon, most likely the cause of the active GI bleeding as outlined above.   2. Advanced atherosclerotic aortic and vascular disease.   3. Massive cardiomegaly, massive pericardial effusion and minimal bibasilar  pleural effusion.   4. Mesenteric edema and anasarca. Plan:  1. Monitor H/H closely, transfuse for Hgb <7  2. Ok to resume anticoagulant now. Recommend restarting while inpatient and monitoring for recurrent bleeding. Discussed w/ patient and daughters regarding risks of recurrent GI bleeding. 3. Continue medical management per primary team  4. Advance diet as tolerated  5. Follow up with GI as an outpatient. GI to sign off. Thank you for this consultation and the opportunity to participate in the care of this patient. Please do not hesitate to call with any questions or concerns, or should event occur that may necessitate additional GI evaluation. Chief Complaint: GI bleed      Subjective:  Feels well, wants real food. No further bleeding. ROS: Denies any fevers, chills, rash.        General: well developed, well nourished, no acute distress  Eyes: conjunctiva normal, EOM normal  Cardiovascular: heart normal, intact distal pulses, normal rate and regular rhythm  Pulmonary: breath sounds normal and effort normal  Abdominal: appearance normal, bowel sounds normal and soft, non-acute, non-tender    Patient Active Problem List   Diagnosis Code    Hypertension I10    Diabetes ([address] E11.9    Hyperlipidemia E78.5    Gout M10.9    AICD (automatic cardioverter/defibrillator) present Z95.810    Vitamin D deficiency E55.9    CHF (congestive heart failure) ([facility]) I50.9    BPH (benign prostatic hypertrophy) N40.0    Anemia D64.9    Atrial fibrillation ([facility]) I48.91    GI bleed K92.2    Symptomatic anemia D64.9    Rectal bleed K62.5    Acute gastric ulcer with hemorrhage K25.0    Dieulafoy lesion (hemorrhagic) of stomach and duodenum (CODE) K31.82    Aphasia R47.01    Type 2 diabetes with nephropathy ([facility]) E11.21    Acute GI bleeding K92.2    Hypovolemia E86.1    Acute blood loss anemia D62    CAD (coronary artery disease) I25.10    Thoracic aortic aneurysm without rupture ([facility]) I71.2    Lower GI bleed K92.2         Visit Vitals  BP (!) 105/59 (BP 1 Location: Right upper arm, BP Patient Position: At rest)   Pulse 76   Temp 97.4 °F (36.3 °C)   Resp 18   Ht 5' 8\" (1.727 m)   Wt 83.8 kg (184 lb 11.9 oz)   SpO2 100%   BMI 28.09 kg/m²           Intake/Output Summary (Last 24 hours) at 5/7/2021 1111  Last data filed at 5/7/2021 0143  Gross per 24 hour   Intake --   Output 600 ml   Net -600 ml       CBC w/Diff    Lab Results   Component Value Date/Time    WBC 6.5 05/07/2021 01:59 AM    RBC 2.77 (L) 05/07/2021 01:59 AM    HGB 7.5 (L) 05/07/2021 08:40 AM    HCT 23.1 (L) 05/07/2021 08:40 AM    MCV 79.8 05/07/2021 01:59 AM    MCH 26.0 05/07/2021 01:59 AM    MCHC 32.6 05/07/2021 01:59 AM    RDW 19.0 (H) 05/07/2021 01:59 AM     (L) 05/07/2021 01:59 AM    Lab Results   Component Value Date/Time    GRANS 68 05/07/2021 01:59 AM    LYMPH 17 (L) 05/07/2021 01:59 AM    EOS 5 05/07/2021 01:59 AM    BASOS 1 05/07/2021 01:59 AM      Basic Metabolic Profile   Recent Labs 05/07/21  0159   *   K 3.5   *   CO2 24   BUN 62*   CA 8.0*   MG 2.0   PHOS 3.3        Hepatic Function    Lab Results   Component Value Date/Time    ALB 3.1 (L) 05/05/2021 04:00 AM    TP 5.6 (L) 05/05/2021 04:00 AM    AP 60 05/05/2021 04:00 AM    No results found for: TBIL       Coags   Recent Labs     05/07/21  0159 05/06/21  0300   PTP 15.1 16.0*   INR 1.2 1.3*               [place], [place]  05/07/21, 4:24 PM   [facility]. [email or web address]  Phone: [phone]  Pager: [phone]

## 2021-05-08 VITALS
BODY MASS INDEX: 28 KG/M2 | SYSTOLIC BLOOD PRESSURE: 138 MMHG | TEMPERATURE: 98 F | HEIGHT: 68 IN | RESPIRATION RATE: 18 BRPM | DIASTOLIC BLOOD PRESSURE: 75 MMHG | OXYGEN SATURATION: 100 % | WEIGHT: 184.75 LBS | HEART RATE: 69 BPM

## 2021-05-08 LAB
ANION GAP SERPL CALC-SCNC: 6 MMOL/L (ref 3–18)
BASOPHILS # BLD: 0 K/UL (ref 0–0.1)
BASOPHILS NFR BLD: 0 % (ref 0–2)
BUN SERPL-MCNC: 48 MG/DL (ref 7–18)
BUN/CREAT SERPL: 32 (ref 12–20)
CALCIUM SERPL-MCNC: 7.7 MG/DL (ref 8.5–10.1)
CHLORIDE SERPL-SCNC: 113 MMOL/L (ref 100–111)
CO2 SERPL-SCNC: 25 MMOL/L (ref 21–32)
CREAT SERPL-MCNC: 1.49 MG/DL (ref 0.6–1.3)
DIFFERENTIAL METHOD BLD: ABNORMAL
EOSINOPHIL # BLD: 0.3 K/UL (ref 0–0.4)
EOSINOPHIL NFR BLD: 5 % (ref 0–5)
ERYTHROCYTE [DISTWIDTH] IN BLOOD BY AUTOMATED COUNT: 19 % (ref 11.6–14.5)
GLUCOSE BLD STRIP.AUTO-MCNC: 116 MG/DL (ref 70–110)
GLUCOSE BLD STRIP.AUTO-MCNC: 192 MG/DL (ref 70–110)
GLUCOSE SERPL-MCNC: 89 MG/DL (ref 74–99)
HCT VFR BLD AUTO: 21.3 % (ref 36–48)
HCT VFR BLD AUTO: 24 % (ref 36–48)
HGB BLD-MCNC: 7 G/DL (ref 13–16)
HGB BLD-MCNC: 7.6 G/DL (ref 13–16)
HISTORY CHECKED?,CKHIST: NORMAL
INR PPP: 1.3 (ref 0.8–1.2)
LYMPHOCYTES # BLD: 1.1 K/UL (ref 0.9–3.6)
LYMPHOCYTES NFR BLD: 20 % (ref 21–52)
MAGNESIUM SERPL-MCNC: 1.7 MG/DL (ref 1.6–2.6)
MCH RBC QN AUTO: 26.1 PG (ref 24–34)
MCHC RBC AUTO-ENTMCNC: 32.9 G/DL (ref 31–37)
MCV RBC AUTO: 79.5 FL (ref 74–97)
MONOCYTES # BLD: 0.6 K/UL (ref 0.05–1.2)
MONOCYTES NFR BLD: 11 % (ref 3–10)
NEUTS SEG # BLD: 3.7 K/UL (ref 1.8–8)
NEUTS SEG NFR BLD: 64 % (ref 40–73)
PHOSPHATE SERPL-MCNC: 2.5 MG/DL (ref 2.5–4.9)
PLATELET # BLD AUTO: 88 K/UL (ref 135–420)
PLATELET COMMENTS,PCOM: ABNORMAL
POTASSIUM SERPL-SCNC: 3.6 MMOL/L (ref 3.5–5.5)
PROTHROMBIN TIME: 16.3 SEC (ref 11.5–15.2)
RBC # BLD AUTO: 2.68 M/UL (ref 4.35–5.65)
RBC MORPH BLD: ABNORMAL
SODIUM SERPL-SCNC: 144 MMOL/L (ref 136–145)
WBC # BLD AUTO: 5.7 K/UL (ref 4.6–13.2)

## 2021-05-08 PROCEDURE — 80048 BASIC METABOLIC PNL TOTAL CA: CPT

## 2021-05-08 PROCEDURE — 85610 PROTHROMBIN TIME: CPT

## 2021-05-08 PROCEDURE — 74011250637 HC RX REV CODE- 250/637: Performed by: INTERNAL MEDICINE

## 2021-05-08 PROCEDURE — 85025 COMPLETE CBC W/AUTO DIFF WBC: CPT

## 2021-05-08 PROCEDURE — P9016 RBC LEUKOCYTES REDUCED: HCPCS

## 2021-05-08 PROCEDURE — 2709999900 HC NON-CHARGEABLE SUPPLY

## 2021-05-08 PROCEDURE — 83735 ASSAY OF MAGNESIUM: CPT

## 2021-05-08 PROCEDURE — 36415 COLL VENOUS BLD VENIPUNCTURE: CPT

## 2021-05-08 PROCEDURE — 99239 HOSP IP/OBS DSCHRG MGMT >30: CPT | Performed by: HOSPITALIST

## 2021-05-08 PROCEDURE — 74011250637 HC RX REV CODE- 250/637: Performed by: HOSPITALIST

## 2021-05-08 PROCEDURE — 82962 GLUCOSE BLOOD TEST: CPT

## 2021-05-08 PROCEDURE — 85018 HEMOGLOBIN: CPT

## 2021-05-08 PROCEDURE — 84100 ASSAY OF PHOSPHORUS: CPT

## 2021-05-08 PROCEDURE — 36430 TRANSFUSION BLD/BLD COMPNT: CPT

## 2021-05-08 RX ORDER — CARVEDILOL 3.12 MG/1
25 TABLET ORAL 2 TIMES DAILY WITH MEALS
Qty: 60 TAB | Refills: 0 | Status: SHIPPED | OUTPATIENT
Start: 2021-05-08

## 2021-05-08 RX ORDER — WARFARIN 2.5 MG/1
5 TABLET ORAL DAILY
Qty: 60 TAB | Refills: 0 | Status: SHIPPED | OUTPATIENT
Start: 2021-05-08

## 2021-05-08 RX ORDER — FUROSEMIDE 20 MG/1
20 TABLET ORAL DAILY
Qty: 30 TAB | Refills: 0 | Status: SHIPPED | OUTPATIENT
Start: 2021-05-08

## 2021-05-08 RX ADMIN — Medication 10 ML: at 07:17

## 2021-05-08 RX ADMIN — FERROUS SULFATE TAB 325 MG (65 MG ELEMENTAL FE) 325 MG: 325 (65 FE) TAB at 10:13

## 2021-05-08 RX ADMIN — ALLOPURINOL 150 MG: 300 TABLET ORAL at 10:14

## 2021-05-08 RX ADMIN — FINASTERIDE 5 MG: 5 TABLET, FILM COATED ORAL at 10:16

## 2021-05-08 RX ADMIN — EZETIMIBE 10 MG: 10 TABLET ORAL at 10:14

## 2021-05-08 RX ADMIN — PANTOPRAZOLE 40 MG: 40 TABLET, DELAYED RELEASE ORAL at 10:15

## 2021-05-08 NOTE — PROGRESS NOTES
I have reviewed discharge instructions with the patient and daughter. The patient and daughter verbalized understanding. Patient home self care.

## 2021-05-08 NOTE — PROGRESS NOTES
Problem: Falls - Risk of  Goal: *Absence of Falls  Description: Document José Miguel James Fall Risk and appropriate interventions in the flowsheet.   5/8/2021 1240 by Dorothea Severin, RN  Outcome: Resolved/Met  5/8/2021 1239 by Dorothea Severin, RN  Outcome: Progressing Towards Goal  Note: Fall Risk Interventions:  Mobility Interventions: Bed/chair exit alarm, Patient to call before getting OOB, Utilize walker, cane, or other assistive device         Medication Interventions: Bed/chair exit alarm, Patient to call before getting OOB, Teach patient to arise slowly    Elimination Interventions: Call light in reach, Bed/chair exit alarm, Patient to call for help with toileting needs, Urinal in reach    History of Falls Interventions: Bed/chair exit alarm, Door open when patient unattended, Room close to nurse's station         Problem: Patient Education: Go to Patient Education Activity  Goal: Patient/Family Education  5/8/2021 1240 by Dorothea Severin, RN  Outcome: Resolved/Met  5/8/2021 1239 by Dorothea Severin, RN  Outcome: Progressing Towards Goal     Problem: Patient Education: Go to Patient Education Activity  Goal: Patient/Family Education  5/8/2021 1240 by Dorothea Severin, RN  Outcome: Resolved/Met  5/8/2021 1239 by Dorothea Severin, RN  Outcome: Progressing Towards Goal     Problem: Upper and Lower GI Bleed: Day 1  Goal: Off Pathway (Use only if patient is Off Pathway)  5/8/2021 1240 by Dorothea Severin, RN  Outcome: Resolved/Met  5/8/2021 1239 by Dorothea Severin, RN  Outcome: Progressing Towards Goal  Goal: Activity/Safety  5/8/2021 1240 by Dorothea Severin, RN  Outcome: Resolved/Met  5/8/2021 1239 by Dorothea Severin, RN  Outcome: Progressing Towards Goal  Goal: Consults, if ordered  5/8/2021 1240 by Dorothea Severin, RN  Outcome: Resolved/Met  5/8/2021 1239 by Dorothea Severin, RN  Outcome: Progressing Towards Goal  Goal: Diagnostic Test/Procedures  5/8/2021 1240 by Dorothea Severin, RN  Outcome: Resolved/Met  5/8/2021 1239 by Ludmila Acevedo RN  Outcome: Progressing Towards Goal  Goal: Nutrition/Diet  5/8/2021 1240 by Ludmila Acevedo, RN  Outcome: Resolved/Met  5/8/2021 1239 by Ludmila Acevedo RN  Outcome: Progressing Towards Goal  Goal: Discharge Planning  5/8/2021 1240 by Ludmila Acevedo, RN  Outcome: Resolved/Met  5/8/2021 1239 by Ludmila Acevedo RN  Outcome: Progressing Towards Goal  Goal: Medications  5/8/2021 1240 by Ludmila Acevedo RN  Outcome: Resolved/Met  5/8/2021 1239 by Ludmila Acevedo RN  Outcome: Progressing Towards Goal  Goal: Respiratory  5/8/2021 1240 by Ludmila Acevedo RN  Outcome: Resolved/Met  5/8/2021 1239 by Ludmila Acevedo RN  Outcome: Progressing Towards Goal  Goal: Treatments/Interventions/Procedures  5/8/2021 1240 by Ludmila Acevedo RN  Outcome: Resolved/Met  5/8/2021 1239 by Ludmila Acevedo RN  Outcome: Progressing Towards Goal  Goal: Psychosocial  5/8/2021 1240 by Ludmila Acevedo, RN  Outcome: Resolved/Met  5/8/2021 1239 by Ludmila Acevedo RN  Outcome: Progressing Towards Goal  Goal: *Optimal pain control at patient's stated goal  5/8/2021 1240 by Ludmila Acevedo, RN  Outcome: Resolved/Met  5/8/2021 1239 by Ludmila Acevedo RN  Outcome: Progressing Towards Goal  Goal: *Hemodynamically stable  5/8/2021 1240 by Ludmila Acevedo, RN  Outcome: Resolved/Met  5/8/2021 1239 by Ludmila Acevedo, RN  Outcome: Progressing Towards Goal  Goal: *Demonstrates progressive activity  5/8/2021 1240 by Ludmila Acevedo, RN  Outcome: Resolved/Met  5/8/2021 1239 by Ludmila Acevedo RN  Outcome: Progressing Towards Goal     Problem: Upper and Lower GI Bleed: Day 2  Goal: Off Pathway (Use only if patient is Off Pathway)  5/8/2021 1240 by Ludmila Acevedo, RN  Outcome: Resolved/Met  5/8/2021 1239 by Ludmila Acevedo RN  Outcome: Progressing Towards Goal  Goal: Activity/Safety  5/8/2021 1240 by Ludmila Acevedo RN  Outcome: Resolved/Met  5/8/2021 1239 by Shauna Marquez, RN  Outcome: Progressing Towards Goal  Goal: Consults, if ordered  5/8/2021 1240 by Shauna Marquez, RN  Outcome: Resolved/Met  5/8/2021 1239 by Shauna Marquez, RN  Outcome: Progressing Towards Goal  Goal: Diagnostic Test/Procedures  5/8/2021 1240 by Shauna Marquez, RN  Outcome: Resolved/Met  5/8/2021 1239 by Shauna Marquez, RN  Outcome: Progressing Towards Goal  Goal: Nutrition/Diet  5/8/2021 1240 by Shauna Marquez, RN  Outcome: Resolved/Met  5/8/2021 1239 by Shauna Marquez, RN  Outcome: Progressing Towards Goal  Goal: Discharge Planning  5/8/2021 1240 by Shauna Marquez, RN  Outcome: Resolved/Met  5/8/2021 1239 by Shauna Marquez, RN  Outcome: Progressing Towards Goal  Goal: Medications  5/8/2021 1240 by Shauna Marquez, RN  Outcome: Resolved/Met  5/8/2021 1239 by Shauna Marquez, RN  Outcome: Progressing Towards Goal  Goal: Respiratory  5/8/2021 1240 by Shauna Marquez, RN  Outcome: Resolved/Met  5/8/2021 1239 by Shauna Marquez RN  Outcome: Progressing Towards Goal  Goal: Treatments/Interventions/Procedures  5/8/2021 1240 by Shauna Marquez, RN  Outcome: Resolved/Met  5/8/2021 1239 by Shauna Marquez RN  Outcome: Progressing Towards Goal  Goal: Psychosocial  5/8/2021 1240 by Shauna Marquez, RN  Outcome: Resolved/Met  5/8/2021 1239 by Shauna Marquez, RN  Outcome: Progressing Towards Goal  Goal: *Optimal pain control at patient's stated goal  5/8/2021 1240 by Shauna Marquez, RN  Outcome: Resolved/Met  5/8/2021 1239 by Shauna Marquez, RN  Outcome: Progressing Towards Goal  Goal: *Hemodynamically stable  5/8/2021 1240 by Shauna Marquez, RN  Outcome: Resolved/Met  5/8/2021 1239 by Shauna Marquez, RN  Outcome: Progressing Towards Goal  Goal: *Tolerating diet  5/8/2021 1240 by Shauna Marquez, RN  Outcome: Resolved/Met  5/8/2021 1239 by Shauna Marquez, RN  Outcome: Progressing Towards Goal  Goal: *Demonstrates progressive activity  5/8/2021 1240 by Dorothea Severin, RN  Outcome: Resolved/Met  5/8/2021 1239 by Dorothea Severin, RN  Outcome: Progressing Towards Goal     Problem: Upper and Lower GI Bleed: Day 3  Goal: Off Pathway (Use only if patient is Off Pathway)  5/8/2021 1240 by Dorothea Severin, RN  Outcome: Resolved/Met  5/8/2021 1239 by Dorothea Severin, RN  Outcome: Progressing Towards Goal  Goal: Activity/Safety  5/8/2021 1240 by Dorothea Severin, RN  Outcome: Resolved/Met  5/8/2021 1239 by Dorothea Severin, RN  Outcome: Progressing Towards Goal  Goal: Diagnostic Test/Procedures  5/8/2021 1240 by Dorothea Severin, RN  Outcome: Resolved/Met  5/8/2021 1239 by Dorothea Severin, RN  Outcome: Progressing Towards Goal  Goal: Nutrition/Diet  5/8/2021 1240 by Dorothea Severin, RN  Outcome: Resolved/Met  5/8/2021 1239 by Dorothea Severin, RN  Outcome: Progressing Towards Goal  Goal: Discharge Planning  5/8/2021 1240 by Dorothea Severin, RN  Outcome: Resolved/Met  5/8/2021 1239 by Dorothea Severin, RN  Outcome: Progressing Towards Goal  Goal: Medications  5/8/2021 1240 by Dorothea Severin, RN  Outcome: Resolved/Met  5/8/2021 1239 by Dorothea Severin, RN  Outcome: Progressing Towards Goal  Goal: Treatments/Interventions/Procedures  5/8/2021 1240 by Dorothea Severin, RN  Outcome: Resolved/Met  5/8/2021 1239 by Dorothea Severin, RN  Outcome: Progressing Towards Goal  Goal: Psychosocial  5/8/2021 1240 by Dorothea Severin, RN  Outcome: Resolved/Met  5/8/2021 1239 by Dorothea Severin, RN  Outcome: Progressing Towards Goal     Problem: Upper and Lower GI Bleed:  Discharge Outcomes  Goal: *Hemodynamically stable  5/8/2021 1240 by Dorothea Severin, RN  Outcome: Resolved/Met  5/8/2021 1239 by Dorothea Severin, RN  Outcome: Progressing Towards Goal  Goal: *Lungs clear or at baseline  5/8/2021 1240 by Dorothea Severin, RN  Outcome: Resolved/Met  5/8/2021 1239 by Dorothea Severin, RN  Outcome: Progressing Towards Goal  Goal: *Demonstrates independent activity or return to baseline  5/8/2021 1240 by Adalberto Boothe RN  Outcome: Resolved/Met  5/8/2021 1239 by Adalberto Boothe RN  Outcome: Progressing Towards Goal  Goal: *Pain is controlled to three or less  5/8/2021 1240 by Adalberto Boothe RN  Outcome: Resolved/Met  5/8/2021 1239 by Adalberto Boothe RN  Outcome: Progressing Towards Goal  Goal: *Verbalizes understanding and describes prescribed diet  5/8/2021 1240 by Adalberto Boothe RN  Outcome: Resolved/Met  5/8/2021 1239 by Adalberto Boothe RN  Outcome: Progressing Towards Goal  Goal: *Tolerating diet  5/8/2021 1240 by Adalberto Boothe RN  Outcome: Resolved/Met  5/8/2021 1239 by Adalberto Boothe RN  Outcome: Progressing Towards Goal  Goal: Thi Castles name, dosage, time, side effects, and number of days to continue medications  5/8/2021 1240 by Adalberto Boothe RN  Outcome: Resolved/Met  5/8/2021 1239 by Adalberto Boothe RN  Outcome: Progressing Towards Goal  Goal: *Anxiety reduced or absent  5/8/2021 1240 by Adalberto Boothe RN  Outcome: Resolved/Met  5/8/2021 1239 by Adalberto Boothe RN  Outcome: Progressing Towards Goal  Goal: *Understands and describes signs and symptoms to report to providers(Stroke Metric)  5/8/2021 1240 by Adalberto Boothe RN  Outcome: Resolved/Met  5/8/2021 1239 by Adalberto Boothe RN  Outcome: Progressing Towards Goal  Goal: *Describes follow-up/return visits to physicians  5/8/2021 1240 by Adalberto Boothe RN  Outcome: Resolved/Met  5/8/2021 1239 by Adalberto Boothe RN  Outcome: Progressing Towards Goal  Goal: *Describes available resources and support systems  5/8/2021 1240 by Adalberto Boothe RN  Outcome: Resolved/Met  5/8/2021 1239 by Adalberto Boothe RN  Outcome: Progressing Towards Goal     Problem: Fluid Volume - Risk of, Imbalanced  Goal: *Balanced intake and output  5/8/2021 1240 by Adalberto Boothe RN  Outcome: Resolved/Met  5/8/2021/2021 1239 by Adalberto Boothe, RN  Outcome: Progressing Towards Goal     Problem: Patient Education: Go to Patient Education Activity  Goal: Patient/Family Education  5/8/2021 1240 by Miri Bruce RN  Outcome: Resolved/Met  5/8/2021 1239 by Miri Bruce RN  Outcome: Progressing Towards Goal     Problem: Pain  Goal: *Control of Pain  5/8/2021 1240 by Miri Bruce RN  Outcome: Resolved/Met  5/8/2021 1239 by Miri Bruce RN  Outcome: Progressing Towards Goal  Goal: *PALLIATIVE CARE:  Alleviation of Pain  5/8/2021 1240 by Miri Bruce RN  Outcome: Resolved/Met  5/8/2021 1239 by Miri Bruce RN  Outcome: Progressing Towards Goal     Problem: Patient Education: Go to Patient Education Activity  Goal: Patient/Family Education  5/8/2021 1240 by Miri Bruce RN  Outcome: Resolved/Met  5/8/2021 1239 by Miri Bruce RN  Outcome: Progressing Towards Goal     Problem: Pressure Injury - Risk of  Goal: *Prevention of pressure injury  Description: Document Cornelio Scale and appropriate interventions in the flowsheet.   5/8/2021 1240 by Miri Bruce RN  Outcome: Resolved/Met  5/8/2021 1239 by Miri Bruce RN  Outcome: Progressing Towards Goal     Problem: Patient Education: Go to Patient Education Activity  Goal: Patient/Family Education  5/8/2021 1240 by Miri Bruce RN  Outcome: Resolved/Met  5/8/2021 1239 by Miri Bruce RN  Outcome: Progressing Towards Goal     Problem: Patient Education: Go to Patient Education Activity  Goal: Patient/Family Education  5/8/2021 1240 by Miri Bruce RN  Outcome: Resolved/Met  5/8/2021 1239 by Miri Bruce RN  Outcome: Progressing Towards Goal     Problem: Patient Education: Go to Patient Education Activity  Goal: Patient/Family Education  5/8/2021 1240 by Miri Bruce RN  Outcome: Resolved/Met  5/8/2021 1239 by Miri Bruce RN  Outcome: Progressing Towards Goal

## 2021-05-08 NOTE — DISCHARGE SUMMARY
Discharge Summary    Patient: Dave Reid MRN: [id number]  CSN: [id number]    YOB: 1945  Age: 66 y.o. Sex: male    DOA: 5/4/2021 LOS:  LOS: 3 days   Discharge Date:      Admission Diagnoses: Lower GI bleed [K92.2]    Discharge Diagnoses:    Problem List as of 5/8/2021 Date Reviewed: 5/5/2021          Codes Class Noted - Resolved    Acute GI bleeding ICD-10-CM: K92.2  ICD-9-CM: 578.9  5/5/2021 - Present        Hypovolemia ICD-10-CM: E86.1  ICD-9-CM: 276.52  5/5/2021 - Present        Acute blood loss anemia ICD-10-CM: D62  ICD-9-CM: 285.1  5/5/2021 - Present        CAD (coronary artery disease) ICD-10-CM: I25.10  ICD-9-CM: 414.00  Unknown - Present        Thoracic aortic aneurysm without rupture ([facility] ICD-10-CM: I71.2  ICD-9-CM: 864. 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 (CODE) ICD-10-CM: K31.82  ICD-9-CM: 537.84  10/8/2017 - Present        Acute gastric ulcer with hemorrhage ICD-10-CM: K25.0  ICD-9-CM: 531.00  10/5/2017 - Present        GI bleed ICD-10-CM: K92.2  ICD-9-CM: 578.9  10/4/2017 - Present        Symptomatic anemia ICD-10-CM: D64.9  ICD-9-CM: 285.9  10/4/2017 - Present        Rectal bleed ICD-10-CM: K62.5  ICD-9-CM: 569.3  10/4/2017 - Present        Hypertension ICD-10-CM: I10  ICD-9-CM: 401.9  Unknown - Present        Diabetes ([address] ICD-10-CM: E11.9  ICD-9-CM: 250.00  Unknown - Present        Hyperlipidemia ICD-10-CM: E78.5  ICD-9-CM: 272.4  Unknown - Present        Gout ICD-10-CM: M10.9  ICD-9-CM: 274.9  Unknown - Present        AICD (automatic cardioverter/defibrillator) present ICD-10-CM: Z95.810  ICD-9-CM: V45.02  Unknown - Present        Vitamin D deficiency ICD-10-CM: E55.9  ICD-9-CM: 268.9  Unknown - Present        CHF (congestive heart failure) (HCC) ICD-10-CM: I50.9  ICD-9-CM: 428.0  Unknown - Present        BPH (benign prostatic hypertrophy) ICD-10-CM: N40.0  ICD-9-CM: 600.00  Unknown - Present        Anemia ICD-10-CM: D64.9  ICD-9-CM: 285. 9  Unknown - Present        Atrial fibrillation (HCC) ICD-10-CM: I48.91  ICD-9-CM: 427.31  Unknown - Present            Reason for Admission  66 y.o. male with previous history of GI bleed on coumadin who presented to the ED with rectal bleeding. Patient reported he had onset of rectal bleeding on 11 AM on date of presentation. He stated that he had 4-5 episodes of BRBPR. He denied melena. Denied chest pain or shortness of breath. Initially seen at [facility] but transferred to [facility] due to unavailability of GI consultants at that facility. His hemoglobin was around 9 at [facility] with an INR of around 3. In the ED here he subsequently became hypotensive, at that time he had significant amount of rectal bleeding per nursing report. His hemoglobin also dropped from around 9 to around 7 on repeat. His blood pressure responded to fluid bolus. He was seen in the ER with daughter present at bedside. Overall patient was a challenging historian and most of his history is obtained by talking to daughter and  Chart review. Discharge Condition: Good    PHYSICAL EXAM at discharge. Visit Vitals  /70 (BP 1 Location: Left upper arm, BP Patient Position: At rest)   Pulse 70   Temp 98.1 °F (36.7 °C)   Resp 18   Ht 5' 8\" (1.727 m)   Wt 83.8 kg (184 lb 11.9 oz)   SpO2 99%   BMI 28.09 kg/m²       Awake alert and oriented x4. Sitting up in chair eating lunch. 2 daughters at bedside. Normocephalic atraumatic pupils equally round reactive to light. Moist mucous membranes  Regular rate and rhythm  Clear to auscultation bilaterally  Abdomen soft nontender nondistended normoactive bowel sounds  Trace ankle edema.   Dorsalis pedis pulses 2+ bilaterally  Other than hearing, no focal deficit  No rash to visible skin    Hospital Course:   1. Acute blood loss anemia related to sigmoid bleed s/p IR performed embolization for diverticular sigmoid bleed (5/5/2021)  2. Hypovolemic shock from blood loss, improved  3. CKD stage III  4. Atrial fibrillation on warfarin (held), and digoxin. Warfarin resumed and patient was monitored overnight, no evidence of bleeding. 5.  CAD. Resume aspirin, beta-blocker, statin. 6.  Diabetes type 2.  resume metformin at discharge. 7.  Hypertension, with relative hypotension this admission. Reduced coreg and lasix; discontinue cozaar. Close f/u with pcp  8. Dyslipidemia on Lipitor  9. History of embolic CVA. Warfarin resumed. .  10.  History of thoracic aortic aneurysm without rupture, followed by Dr. Kathye Gilford, Ej Ledesma  11. History of prostate enlargement. On Proscar, Flomax. 12.  Anemia, microcytic hypochromic. 13.  Anemia requiring transfusion  14. DNR/DNI. Post form completed during this admission and is scanned into the media section of this chart. Discharge to home with home health. Patient and daughters at bedside agree, all questions answered to the best my ability. Consults: Interventional Radiology Dr. Mike Garcia Given Dr Gli Prom    Procedures  successful embolization bleeding site in the descending colon  sigmoid from KAROLINA access.  Nirali Thomas [id number] 10:46 AM      Significant Diagnostic Studies: labs:   Recent Results (from the past 24 hour(s))   GLUCOSE, POC    Collection Time: 05/07/21  3:03 PM   Result Value Ref Range    Glucose (POC) 260 (H) 70 - 110 mg/dL   HGB & HCT    Collection Time: 05/07/21  9:10 PM   Result Value Ref Range    HGB 6.5 (L) 13.0 - 16.0 g/dL    HCT 20.1 (L) 36.0 - 48.0 %   GLUCOSE, POC    Collection Time: 05/07/21  9:22 PM   Result Value Ref Range    Glucose (POC) 148 (H) 70 - 110 mg/dL   RBC, ALLOCATE    Collection Time: 05/07/21 11:15 PM   Result Value Ref Range    HISTORY CHECKED? Historical check performed    PROTHROMBIN TIME + INR    Collection Time: 05/08/21  5:00 AM   Result Value Ref Range    Prothrombin time 16.3 (H) 11.5 - 15.2 sec    INR 1.3 (H) 0.8 - 1.2     MAGNESIUM    Collection Time: 05/08/21  5:00 AM   Result Value Ref Range    Magnesium 1.7 1.6 - 2.6 mg/dL   PHOSPHORUS    Collection Time: 05/08/21  5:00 AM   Result Value Ref Range    Phosphorus 2.5 2.5 - 4.9 MG/DL   CBC WITH AUTOMATED DIFF    Collection Time: 05/08/21  5:00 AM   Result Value Ref Range    WBC 5.7 4.6 - 13.2 K/uL    RBC 2.68 (L) 4.35 - 5.65 M/uL    HGB 7.0 (L) 13.0 - 16.0 g/dL    HCT 21.3 (L) 36.0 - 48.0 %    MCV 79.5 74.0 - 97.0 FL    MCH 26.1 24.0 - 34.0 PG    MCHC 32.9 31.0 - 37.0 g/dL    RDW 19.0 (H) 11.6 - 14.5 %    PLATELET 88 (L) 314 - 420 K/uL    NEUTROPHILS 64 40 - 73 %    LYMPHOCYTES 20 (L) 21 - 52 %    MONOCYTES 11 (H) 3 - 10 %    EOSINOPHILS 5 0 - 5 %    BASOPHILS 0 0 - 2 %    ABS. NEUTROPHILS 3.7 1.8 - 8.0 K/UL    ABS. LYMPHOCYTES 1.1 0.9 - 3.6 K/UL    ABS. MONOCYTES 0.6 0.05 - 1.2 K/UL    ABS. EOSINOPHILS 0.3 0.0 - 0.4 K/UL    ABS.  BASOPHILS 0.0 0.0 - 0.1 K/UL    DF AUTOMATED      PLATELET COMMENTS DECREASED PLATELETS      RBC COMMENTS ANISOCYTOSIS  1+        RBC COMMENTS HYPOCHROMIA  2+        RBC COMMENTS POLYCHROMASIA  1+        RBC COMMENTS SCHISTOCYTES  1+       METABOLIC PANEL, BASIC    Collection Time: 05/08/21  5:00 AM   Result Value Ref Range    Sodium 144 136 - 145 mmol/L    Potassium 3.6 3.5 - 5.5 mmol/L    Chloride 113 (H) 100 - 111 mmol/L    CO2 25 21 - 32 mmol/L    Anion gap 6 3.0 - 18 mmol/L    Glucose 89 74 - 99 mg/dL    BUN 48 (H) 7.0 - 18 MG/DL    Creatinine 1.49 (H) 0.6 - 1.3 MG/DL    BUN/Creatinine ratio 32 (H) 12 - 20      GFR est AA 55 (L) >60 ml/min/1.73m2    GFR est non-AA 46 (L) >60 ml/min/1.73m2    Calcium 7.7 (L) 8.5 - 10.1 MG/DL   GLUCOSE, POC    Collection Time: 05/08/21  7:37 AM   Result Value Ref Range Glucose (POC) 116 (H) 70 - 110 mg/dL   HGB & HCT    Collection Time: 05/08/21  9:20 AM   Result Value Ref Range    HGB 7.6 (L) 13.0 - 16.0 g/dL    HCT 24.0 (L) 36.0 - 48.0 %   GLUCOSE, POC    Collection Time: 05/08/21 11:36 AM   Result Value Ref Range    Glucose (POC) 192 (H) 70 - 110 mg/dL       IMAGING  XR Results (most recent):  Results from Hospital Encounter encounter on 10/04/17   XR CHEST PORT    Narrative PORTABLE CHEST RADIOGRAPH     CPT CODE: 44180     INDICATION: Hypoxia with history of congestive heart failure. COMPARISON: None. FINDINGS:    Frontal view of the chest obtained at 0443 hours. Defibrillator is present with  visualized leads intact. Significant enlarged cardiomediastinal silhouette. Pulmonary vasculature is normal given hypoinflation. Apices are not well  visualized secondary to technique. No focal consolidation otherwise, significant  pleural effusion or pneumothorax. Impression IMPRESSION:    Significant enlarged cardiomediastinal silhouette without sequela of congestive  heart failure. CT Results (most recent):  Results from Hospital Encounter encounter on 05/04/21   CTA ABD PELV W WO CONT    Narrative CT without contrast and CT angiogram of the abdomen and pelvis    HISTORY: Lower GI bleeding    COMPARISON: None. TECHNIQUE: Multi detector helical axial scan through the abdomen and pelvis is  obtained in 2.5 mm thin section before and after dynamic nonionic IV contrast  administration per [facility] protocol. Parenchymal organ imaging is limited by  arterial phase of contrast administration. Contrast: Patient has borderline impaired renal function with 1.8 serum  creatinine level. 100 Visipaque 300 was administered intravenously. Good IV  hydration after scan is advised to ER physician. 3D postprocessed images, including surface shaded display, will produce for this  exam, permanently archived, and interpreted.  Parenchymal organ imaging will be  limited by arterial phase contrast administration. All CT scans at this facility performed using dose optimization techniques as  appreciated to a performed exam, to include automated exposure control,  adjustment of the mA and or KU according to patient size (including appropriate  matching for site specific examination), or use of iterative reconstruction  technique. FINDINGS:    CT ANGIOGRAM: There is advanced atherosclerotic vascular disease identified  involving the aorta and abdominal vasculature. AORTA: Mild tortuosity distally. No aneurysm or dissection. Jaimie Sandra ILIAC ARTERIES: Very tortuous but patent without aneurysm. CELIAC ARTERY:Patent. SMA: Patent. RENAL ARTERIES: Patent. Solitary bilateral arteries. KAROLINA: Patent. GI TRACK: Within the lumen of very distal descending colon, there are  linear/serpiginous hyperdensities identified on early arterial phase, likely  from anterior colonic wall into the lumen on axial # and sagittal  #119/602. This was not seen on precontrast scan. The finding is highly concerned  for contrast extravasation from active bleeding. On the portal venous phase, the  hyperdensity becomes patchy and increased in volume on axial #174/3. No  additional contrast extravasation identified. CT ABDOMEN AND PELVIS:    LUNG BASES: Small left pleural effusion and minimal right pleural effusion. Massive cardiomegaly with massive pericardial effusion. .    ABDOMINAL/PELVIC VISCERA:  The liver, spleen, pancreas, and adrenal glands  appear unremarkable. Small gallbladder sludge is present. No biliary dilatation. The bilateral kidneys appear atrophic with symmetric perfusion. Several cortical  cysts present bilaterally, 2.9 x 3.7 cm at the lateral midpole of right kidney  and 1 cm at anterior midpole of left kidney. The stomach is poorly distended. The small and large bowel are nondilated. Normal appendix. No significant diverticular disease.     The bladder Is suboptimally distended with prominent bladder wall, likely due to  under distention. The prostate is nonenlarged. No free air or free fluid. Moderate mesenteric edema and edema in abdominal and pelvic wall. PERITONEUM/RETROPERITONEUM: No significant adenopathy. CT OSSEOUS STRUCTURES: Spondylosis. Impression 1. Foci of contrast extravasation from anterior wall of the distal descending  colon, most likely the cause of the active GI bleeding as outlined above. 2. Advanced atherosclerotic aortic and vascular disease. 3. Massive cardiomegaly, massive pericardial effusion and minimal bibasilar  pleural effusion. 4. Mesenteric edema and anasarca. A wet read was provided to the ordering physician , Dr. Karina Stewart, by me upon the  interpretation at approximately  1045  hours. Thank you for your referral.      EKG Results     Procedure 720 Value Units Date/Time    EKG, 12 LEAD, INITIAL [[id number]] Collected: 05/06/21 1331    Order Status: Completed Updated: 05/06/21 1435     Ventricular Rate 72 BPM      Atrial Rate 72 BPM      P-R Interval 342 ms      QRS Duration 166 ms      Q-T Interval 460 ms      QTC Calculation (Bezet) 503 ms      Calculated P Axis 51 degrees      Calculated R Axis 163 degrees      Calculated T Axis 19 degrees      Diagnosis --     Electronic ventricular pacemaker  When compared with ECG of 20-OCT-2017 02:00,  Vent. rate has decreased BY  15 BPM  Confirmed by Anneliese Young MD, ----- ([phone]) on 5/6/2021 2:35:45 PM            Discharge Medications:     Current Discharge Medication List      CONTINUE these medications which have CHANGED    Details   warfarin (COUMADIN) 2.5 mg tablet Take 2 Tabs by mouth daily. Dosing per [facility] [phone]. Qty: 60 Tab, Refills: 0      carvediloL (COREG) 3.125 mg tablet Take 8 Tabs by mouth two (2) times daily (with meals). Qty: 60 Tab, Refills: 0      furosemide (LASIX) 20 mg tablet Take 1 Tab by mouth daily.   Qty: 30 Tab, Refills: 0         CONTINUE these medications which have NOT CHANGED    Details   digoxin (LANOXIN) 0.25 mg tablet TAKE ONE-HALF TABLET BY  MOUTH ONCE DAILY      finasteride (PROSCAR) 5 mg tablet Take 1 Tab by mouth daily. Qty: 90 Tab, Refills: 3      tamsulosin (FLOMAX) 0.4 mg capsule Take 2 Caps by mouth daily (after dinner). Qty: 180 Cap, Refills: 3      atorvastatin (LIPITOR) 40 mg tablet atorvastatin 40 mg tablet      aspirin delayed-release 81 mg tablet 81 mg.      ferrous sulfate 325 mg (65 mg iron) tablet Take 325 mg by mouth Daily (before breakfast). multivitamins-minerals-lutein (MEN'S CENTRUM SILVER WITH LUTEIN) tab tablet Take 1 Tab by Mouth Once a Day. vit B complex no.12/niacin,B3, (VITAMIN B COMPLEX NO.12-NIACIN PO) Take  by Mouth.      pantoprazole (PROTONIX) 40 mg tablet Take 1 Tab by mouth two (2) times a day. Qty: 60 Tab, Refills: 1      cholecalciferol (VITAMIN D3) 1,000 unit tablet Take 1,000 Units by mouth daily. ascorbic acid, vitamin C, (VITAMIN C) 1,000 mg tablet Take 1,000 mg by mouth.      metFORMIN (GLUCOPHAGE) 1,000 mg tablet Take 1,000 mg by mouth two (2) times daily (with meals). allopurinol (ZYLOPRIM) 300 mg tablet Take 150 mg by mouth daily. ezetimibe (ZETIA) 10 mg tablet Take  by mouth. STOP taking these medications       spironolactone (ALDACTONE) 25 mg tablet Comments:   Reason for Stopping:         losartan (COZAAR) 100 mg tablet Comments:   Reason for Stopping:               Activity: PT/OT per Home Health    Diet: Cardiac Diet, Diabetic Diet and vitamin K consistent. Wound Care: None needed    Follow-up:   Follow-up Appointments   Procedures    FOLLOW UP VISIT Appointment in: Other (Specify) 1. Dr. Linh Sanchez 3 - 5 days. 2. PM check 5/19/21 8 am [facility] 3. INR check [facility] 5/10/21     1. Dr. Linh Sanchez 3 - 5 days. 2. PM check 5/19/21 8 am [facility]  3.  INR check [facility] 5/10/21     Standing Status:   Standing     Number of Occurrences:   1     Order Specific Question:   Appointment in     Answer:    Other (Specify)       Minutes spent on discharge: >30

## 2021-05-08 NOTE — PROGRESS NOTES
Problem: Falls - Risk of  Goal: *Absence of Falls  Description: Document Samantha Garcia Fall Risk and appropriate interventions in the flowsheet.   Outcome: Progressing Towards Goal  Note: Fall Risk Interventions:  Mobility Interventions: Bed/chair exit alarm, Patient to call before getting OOB, Utilize walker, cane, or other assistive device         Medication Interventions: Bed/chair exit alarm, Patient to call before getting OOB, Teach patient to arise slowly    Elimination Interventions: Call light in reach, Bed/chair exit alarm, Patient to call for help with toileting needs, Urinal in reach    History of Falls Interventions: Bed/chair exit alarm, Door open when patient unattended, Room close to nurse's station         Problem: Patient Education: Go to Patient Education Activity  Goal: Patient/Family Education  Outcome: Progressing Towards Goal     Problem: Patient Education: Go to Patient Education Activity  Goal: Patient/Family Education  Outcome: Progressing Towards Goal     Problem: Upper and Lower GI Bleed: Day 1  Goal: Off Pathway (Use only if patient is Off Pathway)  Outcome: Progressing Towards Goal  Goal: Activity/Safety  Outcome: Progressing Towards Goal  Goal: Consults, if ordered  Outcome: Progressing Towards Goal  Goal: Diagnostic Test/Procedures  Outcome: Progressing Towards Goal  Goal: Nutrition/Diet  Outcome: Progressing Towards Goal  Goal: Discharge Planning  Outcome: Progressing Towards Goal  Goal: Medications  Outcome: Progressing Towards Goal  Goal: Respiratory  Outcome: Progressing Towards Goal  Goal: Treatments/Interventions/Procedures  Outcome: Progressing Towards Goal  Goal: Psychosocial  Outcome: Progressing Towards Goal  Goal: *Optimal pain control at patient's stated goal  Outcome: Progressing Towards Goal  Goal: *Hemodynamically stable  Outcome: Progressing Towards Goal  Goal: *Demonstrates progressive activity  Outcome: Progressing Towards Goal     Problem: Upper and Lower GI Bleed: Day 2  Goal: Off Pathway (Use only if patient is Off Pathway)  Outcome: Progressing Towards Goal  Goal: Activity/Safety  Outcome: Progressing Towards Goal  Goal: Consults, if ordered  Outcome: Progressing Towards Goal  Goal: Diagnostic Test/Procedures  Outcome: Progressing Towards Goal  Goal: Nutrition/Diet  Outcome: Progressing Towards Goal  Goal: Discharge Planning  Outcome: Progressing Towards Goal  Goal: Medications  Outcome: Progressing Towards Goal  Goal: Respiratory  Outcome: Progressing Towards Goal  Goal: Treatments/Interventions/Procedures  Outcome: Progressing Towards Goal  Goal: Psychosocial  Outcome: Progressing Towards Goal  Goal: *Optimal pain control at patient's stated goal  Outcome: Progressing Towards Goal  Goal: *Hemodynamically stable  Outcome: Progressing Towards Goal  Goal: *Tolerating diet  Outcome: Progressing Towards Goal  Goal: *Demonstrates progressive activity  Outcome: Progressing Towards Goal     Problem: Upper and Lower GI Bleed: Day 3  Goal: Off Pathway (Use only if patient is Off Pathway)  Outcome: Progressing Towards Goal  Goal: Activity/Safety  Outcome: Progressing Towards Goal  Goal: Diagnostic Test/Procedures  Outcome: Progressing Towards Goal  Goal: Nutrition/Diet  Outcome: Progressing Towards Goal  Goal: Discharge Planning  Outcome: Progressing Towards Goal  Goal: Medications  Outcome: Progressing Towards Goal  Goal: Treatments/Interventions/Procedures  Outcome: Progressing Towards Goal  Goal: Psychosocial  Outcome: Progressing Towards Goal     Problem: Upper and Lower GI Bleed:  Discharge Outcomes  Goal: *Hemodynamically stable  Outcome: Progressing Towards Goal  Goal: *Lungs clear or at baseline  Outcome: Progressing Towards Goal  Goal: *Demonstrates independent activity or return to baseline  Outcome: Progressing Towards Goal  Goal: *Pain is controlled to three or less  Outcome: Progressing Towards Goal  Goal: *Verbalizes understanding and describes prescribed diet  Outcome: Progressing Towards Goal  Goal: *Tolerating diet  Outcome: Progressing Towards Goal  Goal: *Verbalizes name, dosage, time, side effects, and number of days to continue medications  Outcome: Progressing Towards Goal  Goal: *Anxiety reduced or absent  Outcome: Progressing Towards Goal  Goal: *Understands and describes signs and symptoms to report to providers(Stroke Metric)  Outcome: Progressing Towards Goal  Goal: *Describes follow-up/return visits to physicians  Outcome: Progressing Towards Goal  Goal: *Describes available resources and support systems  Outcome: Progressing Towards Goal     Problem: Fluid Volume - Risk of, Imbalanced  Goal: *Balanced intake and output  Outcome: Progressing Towards Goal     Problem: Patient Education: Go to Patient Education Activity  Goal: Patient/Family Education  Outcome: Progressing Towards Goal     Problem: Pain  Goal: *Control of Pain  Outcome: Progressing Towards Goal  Goal: *PALLIATIVE CARE:  Alleviation of Pain  Outcome: Progressing Towards Goal     Problem: Patient Education: Go to Patient Education Activity  Goal: Patient/Family Education  Outcome: Progressing Towards Goal     Problem: Pressure Injury - Risk of  Goal: *Prevention of pressure injury  Description: Document Cornelio Scale and appropriate interventions in the flowsheet.   Outcome: Progressing Towards Goal     Problem: Patient Education: Go to Patient Education Activity  Goal: Patient/Family Education  Outcome: Progressing Towards Goal     Problem: Patient Education: Go to Patient Education Activity  Goal: Patient/Family Education  Outcome: Progressing Towards Goal     Problem: Patient Education: Go to Patient Education Activity  Goal: Patient/Family Education  Outcome: Progressing Towards Goal

## 2021-05-08 NOTE — PROGRESS NOTES
Discharge order noted for today. No transition of care needs identified.     Km Puag, MSN, RN, ACM-RN  Care Management  [phone]

## 2021-05-08 NOTE — DISCHARGE INSTRUCTIONS
DISCHARGE SUMMARY from Nurse    PATIENT INSTRUCTIONS:    After general anesthesia or intravenous sedation, for 24 hours or while taking prescription Narcotics:  · Limit your activities  · Do not drive and operate hazardous machinery  · Do not make important personal or business decisions  · Do  not drink alcoholic beverages  · If you have not urinated within 8 hours after discharge, please contact your surgeon on call. Report the following to your surgeon:  · Excessive pain, swelling, redness or odor of or around the surgical area  · Temperature over 100.5  · Nausea and vomiting lasting longer than 4 hours or if unable to take medications  · Any signs of decreased circulation or nerve impairment to extremity: change in color, persistent  numbness, tingling, coldness or increase pain  · Any questions    What to do at Home:  Recommended activity: Activity as tolerated,    If you experience any of the following symptoms  Nausea, vomiting, diarrhea, shortness of breath, fever over 101, abnormal bleeding, dizziness, fainting, severe pain, headaches, or any other issues or concerns, please follow up with  Primary Care Physician or 911 for emergencies. *  Please give a list of your current medications to your Primary Care Provider. *  Please update this list whenever your medications are discontinued, doses are      changed, or new medications (including over-the-counter products) are added. *  Please carry medication information at all times in case of emergency situations. These are general instructions for a healthy lifestyle:    No smoking/ No tobacco products/ Avoid exposure to second hand smoke  Surgeon General's Warning:  Quitting smoking now greatly reduces serious risk to your health.     Obesity, smoking, and sedentary lifestyle greatly increases your risk for illness    A healthy diet, regular physical exercise & weight monitoring are important for maintaining a healthy lifestyle    You may be retaining fluid if you have a history of heart failure or if you experience any of the following symptoms:  Weight gain of 3 pounds or more overnight or 5 pounds in a week, increased swelling in our hands or feet or shortness of breath while lying flat in bed. Please call your doctor as soon as you notice any of these symptoms; do not wait until your next office visit. The discharge information has been reviewed with the patient and daughter. The patient and daughter verbalized understanding. Discharge medications reviewed with the patient and daughter and appropriate educational materials and side effects teaching were provided. ___________________________________________________________________________________________________________________________________    Patient armband removed and shredded    Patient Education        Heart Failure: Care Instructions  Your Care Instructions     Heart failure occurs when your heart does not pump as much blood as the body needs. Failure does not mean that the heart has stopped pumping but rather that it is not pumping as well as it should. Over time, this causes fluid buildup in your lungs and other parts of your body. Fluid buildup can cause shortness of breath, fatigue, swollen ankles, and other problems. By taking medicines regularly, reducing sodium (salt) in your diet, checking your weight every day, and making lifestyle changes, you can feel better and live longer. Follow-up care is a key part of your treatment and safety. Be sure to make and go to all appointments, and call your doctor if you are having problems. It's also a good idea to know your test results and keep a list of the medicines you take. How can you care for yourself at home? Medicines    · Be safe with medicines. Take your medicines exactly as prescribed.  Call your doctor if you think you are having a problem with your medicine.     · Do not take any vitamins, over-the-counter medicine, or herbal products without talking to your doctor first. [place] not take ibuprofen (Advil or Motrin) and naproxen (Aleve) without talking to your doctor first. They could make your heart failure worse.     · You may take some of the following medicine. ? Angiotensin-converting enzyme inhibitors (ACEIs) or angiotensin II receptor blockers (ARBs) reduce the heart's workload, lower blood pressure, and reduce swelling. Taking an ACEI or ARB may lower your chance of needing to be hospitalized. ? Beta-blockers can slow heart rate, decrease blood pressure, and improve your condition. Taking a beta-blocker may lower your chance of needing to be hospitalized. ? Diuretics, also called water pills, reduce swelling. You will get more details on the specific medicines your doctor prescribes. Diet    · Your doctor may suggest that you limit sodium. Your doctor can tell you how much sodium is right for you. An example is less than 3,000 mg a day. This includes all the salt you eat in cooking or in packaged foods. People get most of their sodium from processed foods. Fast food and restaurant meals also tend to be very high in sodium.     · Ask your doctor how much liquid you can drink each day. You may have to limit liquids. Weight    · Weigh yourself without clothing at the same time each day. Record your weight. Call your doctor if you have a sudden weight gain, such as more than 2 to 3 pounds in a day or 5 pounds in a week. (Your doctor may suggest a different range of weight gain.) A sudden weight gain may mean that your heart failure is getting worse. Activity level    · Start light exercise (if your doctor says it is okay). Even if you can only do a small amount, exercise will help you get stronger, have more energy, and manage your weight and your stress. Walking is an easy way to get exercise. Start out by walking a little more than you did before.  Bit by bit, increase the amount you walk.     · When you exercise, watch for signs that your heart is working too hard. You are pushing yourself too hard if you cannot talk while you are exercising. If you become short of breath or dizzy or have chest pain, stop, sit down, and rest.     · If you feel \"wiped out\" the day after you exercise, walk slower or for a shorter distance until you can work up to a better pace.     · Get enough rest at night. Sleeping with 1 or 2 pillows under your upper body and head may help you breathe easier. Lifestyle changes    · Do not smoke. Smoking can make a heart condition worse. If you need help quitting, talk to your doctor about stop-smoking programs and medicines. These can increase your chances of quitting for good. Quitting smoking may be the most important step you can take to protect your heart.     · Limit alcohol to 2 drinks a day for men and 1 drink a day for women. Too much alcohol can cause health problems.     · Avoid getting sick from colds and the flu. Get a pneumococcal vaccine shot. If you have had one before, ask your doctor whether you need another dose. Get a flu shot each year. If you must be around people with colds or the flu, wash your hands often. When should you call for help? Call 911 if you have symptoms of sudden heart failure such as:    · You have severe trouble breathing.     · You cough up pink, foamy mucus.     · You have a new irregular or rapid heartbeat. Call your doctor now or seek immediate medical care if:    · You have new or increased shortness of breath.     · You are dizzy or lightheaded, or you feel like you may faint.     · You have sudden weight gain, such as more than 2 to 3 pounds in a day or 5 pounds in a week. (Your doctor may suggest a different range of weight gain.)     · You have increased swelling in your legs, ankles, or feet.     · You are suddenly so tired or weak that you cannot do your usual activities.    Watch closely for changes in your health, and be sure to contact your doctor if you develop new symptoms. Where can you learn more? Go to http://www.gray.com/  Enter F853 in the search box to learn more about \"Heart Failure: Care Instructions. \"  Current as of: August 31, 2020               Content Version: 12.8  © 2006-2021 [facility]. Care instructions adapted under license by [employer] (which disclaims liability or warranty for this information). If you have questions about a medical condition or this instruction, always ask your healthcare professional. [place] [address] any warranty or liability for your use of this information. Patient Education        Anemia: Care Instructions  Your Care Instructions     Anemia is a low level of red blood cells, which carry oxygen throughout your body. Many things can cause anemia. Lack of iron is one of the most common causes. Your body needs iron to make hemoglobin, a substance in red blood cells that carries oxygen from the lungs to your body's cells. Without enough iron, the body produces fewer and smaller red blood cells. As a result, your body's cells do not get enough oxygen, and you feel tired and weak. And you may have trouble concentrating. Bleeding is the most common cause of a lack of iron. You may have heavy menstrual bleeding or bleeding caused by conditions such as ulcers, hemorrhoids, or cancer. Regular use of aspirin or other anti-inflammatory medicines (such as ibuprofen) also can cause bleeding in some people. A lack of iron in your diet also can cause anemia, especially at times when the body needs more iron, such as during pregnancy, infancy, and the teen years. Your doctor may have prescribed iron pills. It may take several months of treatment for your iron levels to return to normal. Your doctor also may suggest that you eat foods that are rich in iron, such as meat and beans. There are many other causes of anemia. It is not always due to a lack of iron. Finding the specific cause of your anemia will help your doctor find the right treatment for you. Follow-up care is a key part of your treatment and safety. Be sure to make and go to all appointments, and call your doctor if you are having problems. It's also a good idea to know your test results and keep a list of the medicines you take. How can you care for yourself at home? · Take your medicines exactly as prescribed. Call your doctor if you think you are having a problem with your medicine. · If your doctor recommends iron pills, take them as directed:  ? Try to take the pills on an empty stomach about 1 hour before or 2 hours after meals. But you may need to take iron with food to avoid an upset stomach. ? Do not take antacids or drink milk or caffeine drinks (such as coffee, tea, or cola) at the same time or within 2 hours of the time that you take your iron. They can make it hard for your body to absorb the iron. ? Vitamin C (from food or supplements) helps your body absorb iron. Try taking iron pills with a glass of orange juice or some other food that is high in vitamin C, such as citrus fruits. ? Iron pills may cause stomach problems, such as heartburn, nausea, diarrhea, constipation, and cramps. Be sure to drink plenty of fluids, and include fruits, vegetables, and fiber in your diet each day. Iron pills often make your bowel movements dark or green. ? If you forget to take an iron pill, do not take a double dose of iron the next time you take a pill. ? Keep iron pills out of the reach of small children. An overdose of iron can be very dangerous. · Follow your doctor's advice about eating iron-rich foods. These include red meat, shellfish, poultry, eggs, beans, raisins, whole-grain bread, and leafy green vegetables. · Steam vegetables to help them keep their iron content. When should you call for help? Call 911 anytime you think you may need emergency care.  For example, call if:    · You have symptoms of a heart attack. These may include:  ? Chest pain or pressure, or a strange feeling in the chest.  ? Sweating. ? Shortness of breath. ? Nausea or vomiting. ? Pain, pressure, or a strange feeling in the back, neck, jaw, or upper belly or in one or both shoulders or arms. ? Lightheadedness or sudden weakness. ? A fast or irregular heartbeat. After you call 911, the  may tell you to chew 1 adult-strength or 2 to 4 low-dose aspirin. Wait for an ambulance. Do not try to drive yourself.     · You passed out (lost consciousness). Call your doctor now or seek immediate medical care if:    · You have new or increased shortness of breath.     · You are dizzy or lightheaded, or you feel like you may faint.     · Your fatigue and weakness continue or get worse.     · You have any abnormal bleeding, such as:  ? Nosebleeds. ? Vaginal bleeding that is different (heavier, more frequent, at a different time of the month) than what you are used to.  ? Bloody or black stools, or rectal bleeding. ? Bloody or pink urine. Watch closely for changes in your health, and be sure to contact your doctor if:    · You do not get better as expected. Where can you learn more? Go to http://www.sinha.com/  Enter R301 in the search box to learn more about \"Anemia: Care Instructions. \"  Current as of: September 23, 2020               Content Version: 12.8  © 2006-2021 [employer]. Care instructions adapted under license by [employer] (which disclaims liability or warranty for this information). If you have questions about a medical condition or this instruction, always ask your healthcare professional. [place] [address] any warranty or liability for your use of this information. Patient Education        Gastrointestinal Bleeding: Care Instructions  Your Care Instructions     The digestive or gastrointestinal tract goes from the mouth to the anus. It is often called the GI tract. Bleeding can happen anywhere in the GI tract. It may be caused by an ulcer, an infection, or cancer. It may also be caused by medicines such as aspirin or ibuprofen. Light bleeding may not cause any symptoms at first. But if you continue to bleed for a while, you may feel very weak or tired. Sudden, heavy bleeding means you need to see a doctor right away. This kind of bleeding can be very dangerous. But it can usually be cured or controlled. The doctor may do some tests to find the cause of your bleeding. Follow-up care is a key part of your treatment and safety. Be sure to make and go to all appointments, and call your doctor if you are having problems. It's also a good idea to know your test results and keep a list of the medicines you take. How can you care for yourself at home? · Be safe with medicines. Take your medicines exactly as prescribed. Call your doctor if you think you are having a problem with your medicine. You will get more details on the specific medicines your doctor prescribes. · Do not take aspirin or other anti-inflammatory medicines, such as naproxen (Aleve) or ibuprofen (Advil, Motrin), without talking to your doctor first. Ask your doctor if it is okay to use acetaminophen (Tylenol). · Do not drink alcohol. · The bleeding may make you lose iron. So it's important to eat foods that have a lot of iron. These include red meat, shellfish, poultry, and eggs. They also include beans, raisins, whole-grain breads, and leafy green vegetables. If you want help planning meals, you can make an appointment with a dietitian. When should you call for help? Call 911 anytime you think you may need emergency care. For example, call if:    · You have sudden, severe belly pain.     · You vomit blood or what looks like coffee grounds.     · You passed out (lost consciousness).     · Your stools are maroon or very bloody.    Call your doctor now or seek immediate medical care if:    · You are dizzy or lightheaded, or you feel like you may faint.     · Your stools are black and look like tar, or they have streaks of blood.     · You have belly pain.     · You vomit or have nausea.     · You have trouble swallowing, or it hurts when you swallow. Watch closely for changes in your health, and be sure to contact your doctor if:    · You do not get better as expected. Where can you learn more? Go to http://www.gray.com/  Enter F981 in the search box to learn more about \"Gastrointestinal Bleeding: Care Instructions. \"  Current as of: February 26, 2020               Content Version: 12.8  © 2006-2021 [employer] Care instructions adapted under license by [employer] (which disclaims liability or warranty for this information). If you have questions about a medical condition or this instruction, always ask your healthcare professional. [place] [address] any warranty or liability for your use of this information. Patient Education        Learning About Coronary Artery Disease (CAD)  What is coronary artery disease? Coronary artery disease is a condition that occurs when plaque builds up in the arteries that bring oxygen-rich blood to your heart. Plaque is a fatty substance made of cholesterol, calcium, and other substances in the blood. This process is called hardening of the arteries, or atherosclerosis. What happens when you have coronary artery disease? · Plaque may narrow the coronary arteries. Narrowed arteries cause poor blood flow. This can lead to angina symptoms such as chest pain or discomfort. If blood flow is completely blocked, you could have a heart attack. · You can slow and reduce the risk of future problems by making changes in your lifestyle. These include quitting smoking and eating heart-healthy foods.   · Treatment, along with changes in your lifestyle, can help you live a longer and healthier life. How can you prevent coronary artery disease? · Do not smoke. It may be the best thing you can do to prevent coronary artery disease. If you need help quitting, talk to your doctor about stop-smoking programs and medicines. These can increase your chances of quitting for good. · Be active. Try to do moderate activity at least 2½ hours a week. Or try to do vigorous activity at least 1¼ hours a week. You may want to walk or try other activities, such as running, swimming, cycling, or playing tennis or team sports. · Eat heart-healthy foods. Eat more fruits and vegetables and less food that contains saturated and trans fats. Limit alcohol, sodium, and sweets. · Stay at a healthy weight. Lose weight if you need to. · Manage other health problems such as diabetes, high blood pressure, and high cholesterol. How is coronary artery disease treated? · Your doctor will suggest that you make lifestyle changes. For example, your doctor may ask you to eat healthy foods, quit smoking, lose extra weight, and be more active. · You will take medicines that help prevent a heart attack. · Your doctor may suggest a procedure to open narrowed or blocked arteries. This is called angioplasty. Or your doctor may suggest using healthy blood vessels to create detours around narrowed or blocked arteries. This is called bypass surgery. Follow-up care is a key part of your treatment and safety. Be sure to make and go to all appointments, and call your doctor if you are having problems. It's also a good idea to know your test results and keep a list of the medicines you take. Where can you learn more? Go to http://www.gray.com/  Enter C643 in the search box to learn more about \"Learning About Coronary Artery Disease (CAD). \"  Current as of: August 31, 2020               Content Version: 12.8  © 0562-8893 [employer], [email or web address].    Care instructions adapted under license by [employer] (which disclaims liability or warranty for this information). If you have questions about a medical condition or this instruction, always ask your healthcare professional. [address] any warranty or liability for your use of this information.

## 2021-05-09 LAB
ABO + RH BLD: NORMAL
BLD PROD TYP BPU: NORMAL
BLOOD GROUP ANTIBODIES SERPL: NORMAL
BPU ID: NORMAL
CALLED TO:,BCALL1: NORMAL
CALLED TO:,BCALL2: NORMAL
CROSSMATCH RESULT,%XM: NORMAL
SPECIMEN EXP DATE BLD: NORMAL
STATUS OF UNIT,%ST: NORMAL
UNIT DIVISION, %UDIV: 0

## 2021-08-03 PROBLEM — D64.9 ANEMIA: Status: RESOLVED | Noted: 2021-08-03 | Resolved: 2021-08-03

## 2021-08-03 PROBLEM — K92.2 GI BLEED: Status: RESOLVED | Noted: 2017-10-04 | Resolved: 2021-08-03

## (undated) DEVICE — GAUZE SPONGES,16 PLY: Brand: CURITY

## (undated) DEVICE — FLEX ADVANTAGE 3000CC: Brand: FLEX ADVANTAGE

## (undated) DEVICE — BASIN EMESIS 500CC ROSE 250/CS 60/PLT: Brand: MEDEGEN MEDICAL PRODUCTS, LLC

## (undated) DEVICE — CATHETER SUCT TR FL TIP 14FR W/ O CTRL

## (undated) DEVICE — ENDOSCOPY PUMP TUBING/ CAP SET: Brand: ERBE

## (undated) DEVICE — FCPS RAD JAW 4LC 240CM W/NDL -- BX/20 RADIAL JAW 4

## (undated) DEVICE — FLUFF AND POLYMER UNDERPAD,EXTRA HEAVY: Brand: WINGS

## (undated) DEVICE — (D)SYR 10ML 1/5ML GRAD NSAF -- PKGING CHANGE USE ITEM 338027

## (undated) DEVICE — BIPOLAR ELECTROHEMOSTASIS CATHETER: Brand: GOLD PROBE

## (undated) DEVICE — SYR 50ML SLIP TIP NSAF LF STRL --

## (undated) DEVICE — STERILE POLYISOPRENE POWDER-FREE SURGICAL GLOVES: Brand: PROTEXIS

## (undated) DEVICE — MEDI-VAC SUCTION HIGH CAPACITY: Brand: CARDINAL HEALTH

## (undated) DEVICE — Device

## (undated) DEVICE — SYRINGE MED 20ML STD CLR PLAS LUERLOCK TIP N CTRL DISP

## (undated) DEVICE — SYRINGE MED 25GA 3ML L5/8IN SUBQ PLAS W/ DETACH NDL SFTY

## (undated) DEVICE — CANNULA ORIG TL CLR W FOAM CUSHIONS AND 14FT SUPL TB 3 CHN

## (undated) DEVICE — SOLUTION IRRIG 1000ML H2O STRL BLT

## (undated) DEVICE — AIRLIFE™ NASAL OXYGEN CANNULA CURVED, FLARED TIP WITH 14 FOOT (4.3 M) CRUSH-RESISTANT TUBING, OVER-THE-EAR STYLE: Brand: AIRLIFE™

## (undated) DEVICE — AIRLIFE™ NASAL OXYGEN CANNULA CURVED, NONFLARED TIP WITH 14 FOOT (4.3 M) CRUSH-RESISTANT TUBING, OVER-THE-EAR STYLE: Brand: AIRLIFE™

## (undated) DEVICE — MEDI-VAC NON-CONDUCTIVE SUCTION TUBING: Brand: CARDINAL HEALTH

## (undated) DEVICE — BITE BLOCK ENDOSCP UNIV AD 6 TO 9.4 MM

## (undated) DEVICE — CATH IV SAFE STR 22GX1IN BLU -- PROTECTIV PLUS

## (undated) DEVICE — GOWN ISOL IMPERV UNIV, DISP, OPEN BACK, BLUE --

## (undated) DEVICE — (D)GLOVE EXAM LG NITRL NS -- DISC BY MFR NO SUB

## (undated) DEVICE — GAUZE,SPONGE,4"X4",16PLY,STRL,LF,10/TRAY: Brand: MEDLINE